# Patient Record
Sex: FEMALE | Race: WHITE | NOT HISPANIC OR LATINO | Employment: UNEMPLOYED | URBAN - METROPOLITAN AREA
[De-identification: names, ages, dates, MRNs, and addresses within clinical notes are randomized per-mention and may not be internally consistent; named-entity substitution may affect disease eponyms.]

---

## 2017-07-05 DIAGNOSIS — M79.671 ACUTE PAIN OF RIGHT FOOT: Primary | ICD-10-CM

## 2017-09-18 PROBLEM — F90.9 ADULT ADHD: Status: ACTIVE | Noted: 2017-09-18

## 2017-09-18 NOTE — PROGRESS NOTES
Follow up ADHD visit    HPI: Iris Blanchard is a 32 y.o. female with ADHD here for follow up and refill of her medication. Pt complains of difficulty concentrating and fatigue over the past month.  Pt is worried that she is becoming tolerant of Adderall XR because she has been on it for so long. She denies sxs of depression.  Sleeping very well, gets 8 hours most nights.  Normal appetite.  No tics, dull affect, headache, stomachache, irritability, tearfulness, sadness, does not feel socially withdrawn, no hallucinations, no loss of appetite, no trouble sleeping. No other side effects reported today.  HPI    Past Medical History:   Diagnosis Date    ADHD (attention deficit hyperactivity disorder)        Current Medication:  Current Outpatient Prescriptions:     cetirizine (ZYRTEC) 10 MG tablet, Take 10 mg by mouth once daily., Disp: , Rfl:     dextroamphetamine-amphetamine (ADDERALL XR) 15 MG 24 hr capsule, Take 1 capsule by mouth once daily., Disp: , Rfl:     dextroamphetamine-amphetamine (ADDERALL XR) 30 MG 24 hr capsule, Take 1 capsule by mouth once daily., Disp: , Rfl:     sulfamethoxazole-trimethoprim 800-160mg (BACTRIM DS) 800-160 mg Tab, Take 1 tablet by mouth every 12 (twelve) hours., Disp: , Rfl:     hydrOXYzine HCl (ATARAX) 25 MG tablet, Take 25 mg by mouth 3 (three) times daily., Disp: , Rfl:     minocycline (MINOCIN,DYNACIN) 50 MG Cap, Take 100 mg by mouth every 12 (twelve) hours., Disp: , Rfl:         ROS:  Stomach upset? NO  Weight loss? No  Insomnia? No  Mood lability/Irritability? No  Palpitions/tics? No      EXAM:  Vitals:    09/19/17 1116   BP: 120/85   BP Location: Left arm   Patient Position: Sitting   BP Method: Medium (Automatic)   Pulse: 101   Resp: 18   SpO2: 98%   Weight: 61 kg (134 lb 6.4 oz)     Physical Exam   Constitutional: She is oriented to person, place, and time. She appears well-developed and well-nourished.   HENT:   Head: Normocephalic and atraumatic.   Eyes: Pupils are  equal, round, and reactive to light.   Cardiovascular: Normal rate, regular rhythm, normal heart sounds and intact distal pulses.    Pulmonary/Chest: Effort normal and breath sounds normal. No respiratory distress.   Neurological: She is alert and oriented to person, place, and time.       Assessment:   1. Adult ADHD         Plan:  Iris was seen today for follow-up.    Diagnoses and all orders for this visit:    Adult ADHD      Change to adderall XR 60mg daily.  F/u in 1 month, sooner if any changes in mood, behavior, declining grades or development of any tics. May consider change to different med if not improving.

## 2017-09-19 ENCOUNTER — OFFICE VISIT (OUTPATIENT)
Dept: FAMILY MEDICINE | Facility: CLINIC | Age: 32
End: 2017-09-19
Payer: MEDICAID

## 2017-09-19 VITALS
RESPIRATION RATE: 18 BRPM | WEIGHT: 134.38 LBS | OXYGEN SATURATION: 98 % | HEART RATE: 101 BPM | DIASTOLIC BLOOD PRESSURE: 85 MMHG | SYSTOLIC BLOOD PRESSURE: 120 MMHG

## 2017-09-19 DIAGNOSIS — F90.9 ADULT ADHD: ICD-10-CM

## 2017-09-19 PROBLEM — N83.8 MASS OF OVARY: Status: ACTIVE | Noted: 2017-09-19

## 2017-09-19 PROBLEM — S76.119A RUPTURE OF QUADRICEPS TENDON: Status: RESOLVED | Noted: 2017-09-19 | Resolved: 2017-09-19

## 2017-09-19 PROBLEM — S76.119A RUPTURE OF QUADRICEPS TENDON: Status: ACTIVE | Noted: 2017-09-19

## 2017-09-19 PROBLEM — M41.9 SCOLIOSIS OF LUMBAR SPINE: Status: ACTIVE | Noted: 2017-09-19

## 2017-09-19 PROCEDURE — 99203 OFFICE O/P NEW LOW 30 MIN: CPT | Mod: ,,, | Performed by: INTERNAL MEDICINE

## 2017-09-19 RX ORDER — DEXTROAMPHETAMINE SACCHARATE, AMPHETAMINE ASPARTATE MONOHYDRATE, DEXTROAMPHETAMINE SULFATE AND AMPHETAMINE SULFATE 7.5; 7.5; 7.5; 7.5 MG/1; MG/1; MG/1; MG/1
1 CAPSULE, EXTENDED RELEASE ORAL DAILY
COMMUNITY
End: 2017-09-19 | Stop reason: SDUPTHER

## 2017-09-19 RX ORDER — DEXTROAMPHETAMINE SACCHARATE, AMPHETAMINE ASPARTATE MONOHYDRATE, DEXTROAMPHETAMINE SULFATE AND AMPHETAMINE SULFATE 7.5; 7.5; 7.5; 7.5 MG/1; MG/1; MG/1; MG/1
60 CAPSULE, EXTENDED RELEASE ORAL DAILY
Qty: 60 CAPSULE | Refills: 0 | Status: SHIPPED | OUTPATIENT
Start: 2017-09-19 | End: 2017-09-26 | Stop reason: SDUPTHER

## 2017-09-19 RX ORDER — DEXTROAMPHETAMINE SACCHARATE, AMPHETAMINE ASPARTATE, DEXTROAMPHETAMINE SULFATE AND AMPHETAMINE SULFATE 2.5; 2.5; 2.5; 2.5 MG/1; MG/1; MG/1; MG/1
1 TABLET ORAL DAILY PRN
COMMUNITY
End: 2017-09-19

## 2017-09-19 RX ORDER — MINOCYCLINE HYDROCHLORIDE 50 MG/1
100 CAPSULE ORAL
COMMUNITY
End: 2017-09-19

## 2017-09-19 RX ORDER — CETIRIZINE HYDROCHLORIDE 10 MG/1
10 TABLET ORAL DAILY
COMMUNITY
End: 2018-11-27 | Stop reason: SDUPTHER

## 2017-09-19 RX ORDER — SULFAMETHOXAZOLE AND TRIMETHOPRIM 800; 160 MG/1; MG/1
1 TABLET ORAL
COMMUNITY
End: 2017-10-19

## 2017-09-19 RX ORDER — DEXTROAMPHETAMINE SACCHARATE, AMPHETAMINE ASPARTATE MONOHYDRATE, DEXTROAMPHETAMINE SULFATE AND AMPHETAMINE SULFATE 3.75; 3.75; 3.75; 3.75 MG/1; MG/1; MG/1; MG/1
1 CAPSULE, EXTENDED RELEASE ORAL DAILY
COMMUNITY
End: 2017-09-19

## 2017-09-19 RX ORDER — HYDROXYZINE HYDROCHLORIDE 25 MG/1
25 TABLET, FILM COATED ORAL 3 TIMES DAILY
COMMUNITY
End: 2017-10-19 | Stop reason: SDUPTHER

## 2017-09-26 DIAGNOSIS — F90.9 ADULT ADHD: ICD-10-CM

## 2017-09-26 RX ORDER — DEXTROAMPHETAMINE SACCHARATE, AMPHETAMINE ASPARTATE MONOHYDRATE, DEXTROAMPHETAMINE SULFATE AND AMPHETAMINE SULFATE 6.25; 6.25; 6.25; 6.25 MG/1; MG/1; MG/1; MG/1
25 CAPSULE, EXTENDED RELEASE ORAL EVERY MORNING
Qty: 30 CAPSULE | Refills: 0 | Status: SHIPPED | OUTPATIENT
Start: 2017-09-26 | End: 2017-10-19 | Stop reason: SDUPTHER

## 2017-09-26 RX ORDER — DEXTROAMPHETAMINE SACCHARATE, AMPHETAMINE ASPARTATE MONOHYDRATE, DEXTROAMPHETAMINE SULFATE AND AMPHETAMINE SULFATE 7.5; 7.5; 7.5; 7.5 MG/1; MG/1; MG/1; MG/1
30 CAPSULE, EXTENDED RELEASE ORAL DAILY
Qty: 30 CAPSULE | Refills: 0 | Status: SHIPPED | OUTPATIENT
Start: 2017-09-26 | End: 2017-10-19 | Stop reason: SDUPTHER

## 2017-10-19 ENCOUNTER — OFFICE VISIT (OUTPATIENT)
Dept: FAMILY MEDICINE | Facility: CLINIC | Age: 32
End: 2017-10-19
Payer: MEDICAID

## 2017-10-19 VITALS
DIASTOLIC BLOOD PRESSURE: 87 MMHG | RESPIRATION RATE: 18 BRPM | SYSTOLIC BLOOD PRESSURE: 126 MMHG | TEMPERATURE: 98 F | WEIGHT: 129.81 LBS | OXYGEN SATURATION: 98 % | HEART RATE: 111 BPM

## 2017-10-19 DIAGNOSIS — F90.9 ADULT ADHD: ICD-10-CM

## 2017-10-19 DIAGNOSIS — F41.9 ANXIETY: Primary | ICD-10-CM

## 2017-10-19 DIAGNOSIS — Z72.0 TOBACCO ABUSE: ICD-10-CM

## 2017-10-19 PROCEDURE — 99213 OFFICE O/P EST LOW 20 MIN: CPT | Mod: ,,, | Performed by: INTERNAL MEDICINE

## 2017-10-19 RX ORDER — DEXTROAMPHETAMINE SACCHARATE, AMPHETAMINE ASPARTATE MONOHYDRATE, DEXTROAMPHETAMINE SULFATE AND AMPHETAMINE SULFATE 7.5; 7.5; 7.5; 7.5 MG/1; MG/1; MG/1; MG/1
30 CAPSULE, EXTENDED RELEASE ORAL DAILY
Qty: 30 CAPSULE | Refills: 0 | Status: SHIPPED | OUTPATIENT
Start: 2017-10-19 | End: 2017-10-19 | Stop reason: SDUPTHER

## 2017-10-19 RX ORDER — DEXTROAMPHETAMINE SACCHARATE, AMPHETAMINE ASPARTATE MONOHYDRATE, DEXTROAMPHETAMINE SULFATE AND AMPHETAMINE SULFATE 7.5; 7.5; 7.5; 7.5 MG/1; MG/1; MG/1; MG/1
30 CAPSULE, EXTENDED RELEASE ORAL DAILY
Qty: 30 CAPSULE | Refills: 0 | Status: SHIPPED | OUTPATIENT
Start: 2017-12-19 | End: 2018-01-22 | Stop reason: SDUPTHER

## 2017-10-19 RX ORDER — DEXTROAMPHETAMINE SACCHARATE, AMPHETAMINE ASPARTATE MONOHYDRATE, DEXTROAMPHETAMINE SULFATE AND AMPHETAMINE SULFATE 7.5; 7.5; 7.5; 7.5 MG/1; MG/1; MG/1; MG/1
30 CAPSULE, EXTENDED RELEASE ORAL DAILY
Qty: 30 CAPSULE | Refills: 0 | Status: SHIPPED | OUTPATIENT
Start: 2017-11-19 | End: 2017-10-19 | Stop reason: SDUPTHER

## 2017-10-19 RX ORDER — HYDROXYZINE HYDROCHLORIDE 25 MG/1
25 TABLET, FILM COATED ORAL 3 TIMES DAILY PRN
Qty: 10 TABLET | Refills: 3 | Status: SHIPPED | OUTPATIENT
Start: 2017-10-19 | End: 2018-01-24

## 2017-10-19 RX ORDER — DIPHENHYDRAMINE HCL 25 MG
CAPSULE ORAL
COMMUNITY
Start: 2017-08-20 | End: 2017-10-19 | Stop reason: SDUPTHER

## 2017-10-19 RX ORDER — DEXTROAMPHETAMINE SACCHARATE, AMPHETAMINE ASPARTATE MONOHYDRATE, DEXTROAMPHETAMINE SULFATE AND AMPHETAMINE SULFATE 6.25; 6.25; 6.25; 6.25 MG/1; MG/1; MG/1; MG/1
25 CAPSULE, EXTENDED RELEASE ORAL EVERY MORNING
Qty: 30 CAPSULE | Refills: 0 | Status: SHIPPED | OUTPATIENT
Start: 2017-11-19 | End: 2017-10-19 | Stop reason: SDUPTHER

## 2017-10-19 RX ORDER — DIPHENHYDRAMINE HCL 25 MG
4 CAPSULE ORAL
Qty: 100 EACH | Refills: 5 | Status: SHIPPED | OUTPATIENT
Start: 2017-10-19 | End: 2018-01-24

## 2017-10-19 RX ORDER — DEXTROAMPHETAMINE SACCHARATE, AMPHETAMINE ASPARTATE MONOHYDRATE, DEXTROAMPHETAMINE SULFATE AND AMPHETAMINE SULFATE 6.25; 6.25; 6.25; 6.25 MG/1; MG/1; MG/1; MG/1
25 CAPSULE, EXTENDED RELEASE ORAL EVERY MORNING
Qty: 30 CAPSULE | Refills: 0 | Status: SHIPPED | OUTPATIENT
Start: 2017-10-19 | End: 2017-10-19 | Stop reason: SDUPTHER

## 2017-10-19 RX ORDER — DEXTROAMPHETAMINE SACCHARATE, AMPHETAMINE ASPARTATE MONOHYDRATE, DEXTROAMPHETAMINE SULFATE AND AMPHETAMINE SULFATE 6.25; 6.25; 6.25; 6.25 MG/1; MG/1; MG/1; MG/1
25 CAPSULE, EXTENDED RELEASE ORAL EVERY MORNING
Qty: 30 CAPSULE | Refills: 0 | Status: SHIPPED | OUTPATIENT
Start: 2017-12-19 | End: 2018-01-22 | Stop reason: SDUPTHER

## 2017-10-19 NOTE — PROGRESS NOTES
Follow up ADHD visit    HPI: Iris Blanchard is a 32 y.o. female with ADHD here for follow up and refill of her medication. She  has been doing okay overall. Feels [employer] is working, though she is stressed due to her son's medical issues.     ROS: No tics, dull affect, headache, stomachache, irritability, tearfulness, sadness, does not feel socially withdrawn, no hallucinations, no loss of appetite, no trouble sleeping. No other side effects reported today.    Past Medical History:   Diagnosis Date    ADHD (attention deficit hyperactivity disorder)     Anxiety          Current Outpatient Prescriptions:     cetirizine (ZYRTEC) 10 MG tablet, Take 10 mg by mouth once daily., Disp: , Rfl:     [START ON 12/19/2017] dextroamphetamine-amphetamine (ADDERALL XR) 25 MG 24 hr capsule, Take 1 capsule (25 mg total) by mouth every morning., Disp: 30 capsule, Rfl: 0    [START ON 12/19/2017] dextroamphetamine-amphetamine (ADDERALL XR) 30 MG 24 hr capsule, Take 1 capsule (30 mg total) by mouth once daily., Disp: 30 capsule, Rfl: 0    hydrOXYzine HCl (ATARAX) 25 MG tablet, Take 1 tablet (25 mg total) by mouth 3 (three) times daily as needed for Anxiety., Disp: 10 tablet, Rfl: 3    nicotine polacrilex (NICORETTE) 4 MG Gum, , Disp: , Rfl:     Exam:  Vitals:    10/19/17 1028   BP: 126/87   BP Location: Right arm   Patient Position: Sitting   BP Method: Medium (Automatic)   Pulse: (!) 111   Resp: 18   Temp: 98.2 °F (36.8 °C)   TempSrc: Oral   SpO2: 98%   Weight: 58.9 kg (129 lb 12.8 oz)     Physical Exam   Constitutional: She is oriented to person, place, and time. She appears well-developed and well-nourished. No distress.   Eyes: Pupils are equal, round, and reactive to light.   Cardiovascular: Normal rate, regular rhythm and normal heart sounds.    Pulmonary/Chest: Effort normal and breath sounds normal.   Neurological: She is alert and oriented to person, place, and time.   Psychiatric: She has a normal mood and affect.  Her behavior is normal.       Assessment/Plan:  Iris is here for follow-up of ADHD, which is well controlled on current treatment plan. Continue on current medication regimen. Regular follow-up in 3 months, sooner if any changes in mood, behavior, development of any side effects.     Problem List Items Addressed This Visit        Psychiatric    Adult ADHD    Relevant Medications    dextroamphetamine-amphetamine (ADDERALL XR) 25 MG 24 hr capsule (Start on 12/19/2017)    dextroamphetamine-amphetamine (ADDERALL XR) 30 MG 24 hr capsule (Start on 12/19/2017)      Other Visit Diagnoses     Anxiety    -  Primary    Relevant Medications    hydrOXYzine HCl (ATARAX) 25 MG tablet    Tobacco abuse

## 2017-11-07 ENCOUNTER — OFFICE VISIT (OUTPATIENT)
Dept: FAMILY MEDICINE | Facility: CLINIC | Age: 32
End: 2017-11-07
Payer: MEDICAID

## 2017-11-07 VITALS
TEMPERATURE: 98 F | WEIGHT: 134 LBS | HEART RATE: 106 BPM | RESPIRATION RATE: 18 BRPM | OXYGEN SATURATION: 99 % | DIASTOLIC BLOOD PRESSURE: 86 MMHG | SYSTOLIC BLOOD PRESSURE: 132 MMHG

## 2017-11-07 DIAGNOSIS — H92.02 LEFT EAR PAIN: Primary | ICD-10-CM

## 2017-11-07 PROCEDURE — 99213 OFFICE O/P EST LOW 20 MIN: CPT | Mod: ,,, | Performed by: INTERNAL MEDICINE

## 2017-11-07 NOTE — PROGRESS NOTES
Adult Urgent Visit    Chief Complaint   Patient presents with    Otalgia     left ear       31 yo woman here with 3 days of increasing L ear pain. Denies URI symptoms, though she has a h/o nasal allergies for which she takes xyzal. No fever.  No discharge from ear.  On first day also had L sided TMJ pain but denies h/o TMJ dysfunction, jaw popping/clicking.        Otalgia    Associated symptoms include rhinorrhea. Pertinent negatives include no abdominal pain, coughing, diarrhea, ear discharge, headaches, hearing loss, neck pain, rash or sore throat.       Review of Systems   HENT: Positive for ear pain and rhinorrhea. Negative for ear discharge, hearing loss and sore throat.    Respiratory: Negative for cough.    Gastrointestinal: Negative for abdominal pain and diarrhea.   Musculoskeletal: Negative for neck pain.   Skin: Negative for rash.   Neurological: Negative for headaches.       Past medical, social and family history reviewed and there are no pertinent changes.       Current Outpatient Prescriptions:     cetirizine (ZYRTEC) 10 MG tablet, Take 10 mg by mouth once daily., Disp: , Rfl:     [START ON 12/19/2017] dextroamphetamine-amphetamine (ADDERALL XR) 25 MG 24 hr capsule, Take 1 capsule (25 mg total) by mouth every morning., Disp: 30 capsule, Rfl: 0    [START ON 12/19/2017] dextroamphetamine-amphetamine (ADDERALL XR) 30 MG 24 hr capsule, Take 1 capsule (30 mg total) by mouth once daily., Disp: 30 capsule, Rfl: 0    hydrOXYzine HCl (ATARAX) 25 MG tablet, Take 1 tablet (25 mg total) by mouth 3 (three) times daily as needed for Anxiety., Disp: 10 tablet, Rfl: 3    nicotine polacrilex (NICORETTE) 4 MG Gum, Take 1 each (4 mg total) by mouth as needed., Disp: 100 each, Rfl: 5    Vitals:    11/07/17 1258   BP: 132/86   BP Location: Left arm   Patient Position: Sitting   BP Method: Medium (Automatic)   Pulse: 106   Resp: 18   Temp: 97.8 °F (36.6 °C)   TempSrc: Oral   SpO2: 99%   Weight:  60.8 kg (134 lb)       Physical Exam   Constitutional: She appears well-developed and well-nourished. No distress.   HENT:   Right Ear: Tympanic membrane and ear canal normal.   Left Ear: External ear and ear canal normal. No swelling. Tympanic membrane is not erythematous, not retracted and not bulging. A middle ear effusion is present.   Eyes: Pupils are equal, round, and reactive to light.   Cardiovascular: Regular rhythm, normal heart sounds and intact distal pulses.    No murmur heard.  Pulmonary/Chest: Effort normal and breath sounds normal. She has no wheezes. She has no rales.   Musculoskeletal: She exhibits no edema.       Asessment/Plan:  Iris is a 32 y.o. female here with complaint of Otalgia (left ear)  No obvious cause on exam, though there is a little clear fluid behind L ear. Advised NSAIDs for pain, continue xyzal and add decongestant for 1 week.  If not improving, will refer to ENT.       Problem List Items Addressed This Visit     None

## 2018-01-22 ENCOUNTER — TELEPHONE (OUTPATIENT)
Dept: FAMILY MEDICINE | Facility: CLINIC | Age: 33
End: 2018-01-22

## 2018-01-22 DIAGNOSIS — F90.9 ADULT ADHD: ICD-10-CM

## 2018-01-22 RX ORDER — DEXTROAMPHETAMINE SACCHARATE, AMPHETAMINE ASPARTATE MONOHYDRATE, DEXTROAMPHETAMINE SULFATE AND AMPHETAMINE SULFATE 7.5; 7.5; 7.5; 7.5 MG/1; MG/1; MG/1; MG/1
30 CAPSULE, EXTENDED RELEASE ORAL DAILY
Qty: 30 CAPSULE | Refills: 0 | Status: SHIPPED | OUTPATIENT
Start: 2018-01-22 | End: 2018-01-24 | Stop reason: SDUPTHER

## 2018-01-22 RX ORDER — DEXTROAMPHETAMINE SACCHARATE, AMPHETAMINE ASPARTATE MONOHYDRATE, DEXTROAMPHETAMINE SULFATE AND AMPHETAMINE SULFATE 6.25; 6.25; 6.25; 6.25 MG/1; MG/1; MG/1; MG/1
25 CAPSULE, EXTENDED RELEASE ORAL EVERY MORNING
Qty: 30 CAPSULE | Refills: 0 | Status: SHIPPED | OUTPATIENT
Start: 2018-01-22 | End: 2018-01-24 | Stop reason: SDUPTHER

## 2018-01-22 NOTE — TELEPHONE ENCOUNTER
----- Message from Liliane Herrera sent at 1/22/2018  8:58 AM CST -----  Patient states she is out of her adderall and needs it called in before her Weds appt.

## 2018-01-24 ENCOUNTER — OFFICE VISIT (OUTPATIENT)
Dept: FAMILY MEDICINE | Facility: CLINIC | Age: 33
End: 2018-01-24
Payer: MEDICAID

## 2018-01-24 VITALS
RESPIRATION RATE: 18 BRPM | TEMPERATURE: 98 F | WEIGHT: 135 LBS | HEART RATE: 56 BPM | SYSTOLIC BLOOD PRESSURE: 122 MMHG | OXYGEN SATURATION: 98 % | DIASTOLIC BLOOD PRESSURE: 60 MMHG

## 2018-01-24 DIAGNOSIS — F90.9 ADULT ADHD: ICD-10-CM

## 2018-01-24 DIAGNOSIS — J01.20 ACUTE NON-RECURRENT ETHMOIDAL SINUSITIS: ICD-10-CM

## 2018-01-24 PROCEDURE — 99213 OFFICE O/P EST LOW 20 MIN: CPT | Mod: ,,, | Performed by: INTERNAL MEDICINE

## 2018-01-24 RX ORDER — BENZONATATE 100 MG/1
100 CAPSULE ORAL 3 TIMES DAILY PRN
Qty: 30 CAPSULE | Refills: 0 | Status: SHIPPED | OUTPATIENT
Start: 2018-01-24 | End: 2018-04-18

## 2018-01-24 RX ORDER — AMOXICILLIN AND CLAVULANATE POTASSIUM 875; 125 MG/1; MG/1
1 TABLET, FILM COATED ORAL 2 TIMES DAILY
Qty: 20 TABLET | Refills: 0 | Status: SHIPPED | OUTPATIENT
Start: 2018-01-24 | End: 2018-04-18

## 2018-01-24 RX ORDER — MINOCYCLINE HYDROCHLORIDE 50 MG/1
CAPSULE ORAL
COMMUNITY
Start: 2017-12-16 | End: 2018-07-16

## 2018-01-24 RX ORDER — DEXTROAMPHETAMINE SACCHARATE, AMPHETAMINE ASPARTATE MONOHYDRATE, DEXTROAMPHETAMINE SULFATE AND AMPHETAMINE SULFATE 6.25; 6.25; 6.25; 6.25 MG/1; MG/1; MG/1; MG/1
25 CAPSULE, EXTENDED RELEASE ORAL EVERY MORNING
Qty: 30 CAPSULE | Refills: 0 | Status: SHIPPED | OUTPATIENT
Start: 2018-02-24 | End: 2018-01-24 | Stop reason: SDUPTHER

## 2018-01-24 RX ORDER — LORATADINE 10 MG/1
TABLET ORAL
COMMUNITY
Start: 2018-01-07 | End: 2018-07-16

## 2018-01-24 RX ORDER — DEXTROAMPHETAMINE SACCHARATE, AMPHETAMINE ASPARTATE MONOHYDRATE, DEXTROAMPHETAMINE SULFATE AND AMPHETAMINE SULFATE 7.5; 7.5; 7.5; 7.5 MG/1; MG/1; MG/1; MG/1
30 CAPSULE, EXTENDED RELEASE ORAL DAILY
Qty: 30 CAPSULE | Refills: 0 | Status: SHIPPED | OUTPATIENT
Start: 2018-02-24 | End: 2018-01-24 | Stop reason: SDUPTHER

## 2018-01-24 RX ORDER — DEXTROAMPHETAMINE SACCHARATE, AMPHETAMINE ASPARTATE MONOHYDRATE, DEXTROAMPHETAMINE SULFATE AND AMPHETAMINE SULFATE 7.5; 7.5; 7.5; 7.5 MG/1; MG/1; MG/1; MG/1
30 CAPSULE, EXTENDED RELEASE ORAL DAILY
Qty: 30 CAPSULE | Refills: 0 | Status: SHIPPED | OUTPATIENT
Start: 2018-03-24 | End: 2018-04-18 | Stop reason: SDUPTHER

## 2018-01-24 RX ORDER — DEXTROAMPHETAMINE SACCHARATE, AMPHETAMINE ASPARTATE MONOHYDRATE, DEXTROAMPHETAMINE SULFATE AND AMPHETAMINE SULFATE 6.25; 6.25; 6.25; 6.25 MG/1; MG/1; MG/1; MG/1
25 CAPSULE, EXTENDED RELEASE ORAL EVERY MORNING
Qty: 30 CAPSULE | Refills: 0 | Status: SHIPPED | OUTPATIENT
Start: 2018-03-24 | End: 2018-04-18 | Stop reason: SDUPTHER

## 2018-01-24 NOTE — PROGRESS NOTES
Adult Urgent Visit    Chief Complaint   Patient presents with    Medication Refill    Cough       31 yo here with 3 weeks of nasal congestion, post nasal drip, sinus congestion and cough.  Had fever initially, none recently. Taking OTC cold meds without improvement.       Medication Refill   Associated symptoms include congestion and coughing. Pertinent negatives include no abdominal pain, anorexia, fever, headaches, joint swelling or nausea.   Cough   Pertinent negatives include no fever or headaches.       Review of Systems   Constitutional: Negative for fever.   HENT: Positive for congestion.    Respiratory: Positive for cough.    Gastrointestinal: Negative for abdominal pain, anorexia and nausea.   Musculoskeletal: Negative for joint swelling.   Neurological: Negative for headaches.       Past medical, social and family history reviewed and there are no pertinent changes.       Current Outpatient Prescriptions:     cetirizine (ZYRTEC) 10 MG tablet, Take 10 mg by mouth once daily., Disp: , Rfl:     [START ON 3/24/2018] dextroamphetamine-amphetamine (ADDERALL XR) 25 MG 24 hr capsule, Take 1 capsule (25 mg total) by mouth every morning., Disp: 30 capsule, Rfl: 0    [START ON 3/24/2018] dextroamphetamine-amphetamine (ADDERALL XR) 30 MG 24 hr capsule, Take 1 capsule (30 mg total) by mouth once daily., Disp: 30 capsule, Rfl: 0    loratadine (CLARITIN) 10 mg tablet, , Disp: , Rfl:     minocycline (MINOCIN,DYNACIN) 50 MG Cap, , Disp: , Rfl:     amoxicillin-clavulanate 875-125mg (AUGMENTIN) 875-125 mg per tablet, Take 1 tablet by mouth 2 (two) times daily., Disp: 20 tablet, Rfl: 0    benzonatate (TESSALON) 100 MG capsule, Take 1 capsule (100 mg total) by mouth 3 (three) times daily as needed for Cough., Disp: 30 capsule, Rfl: 0    Vitals:    01/24/18 1316   BP: 122/60   BP Location: Left arm   Patient Position: Sitting   BP Method: Medium (Manual)   Pulse: (!) 56   Resp: 18   Temp: 98.2 °F  (36.8 °C)   TempSrc: Oral   SpO2: 98%   Weight: 61.2 kg (135 lb)       Physical Exam   Constitutional: She appears well-developed and well-nourished. No distress.   HENT:   Right Ear: Tympanic membrane normal.   Left Ear: Tympanic membrane normal.   Nose: Mucosal edema, rhinorrhea and sinus tenderness present. Right sinus exhibits no maxillary sinus tenderness and no frontal sinus tenderness. Left sinus exhibits no maxillary sinus tenderness and no frontal sinus tenderness.   Mouth/Throat: Posterior oropharyngeal erythema (post nasal rhinorrhea) present. No oropharyngeal exudate or posterior oropharyngeal edema.   Eyes: Pupils are equal, round, and reactive to light.   Cardiovascular: Regular rhythm, normal heart sounds and intact distal pulses.    No murmur heard.  Pulmonary/Chest: Effort normal and breath sounds normal. She has no wheezes. She has no rales.   Musculoskeletal: She exhibits no edema.       Asessment/Plan:  Iris is a 32 y.o. female here with complaint of Medication Refill and Cough  Continue adderall 55 mg daily, no side effects or issues.  Treat for sinusitis with augmentin, PrN tessalon perles. Advised flonase BID for 2 weeks.     Problem List Items Addressed This Visit        Psychiatric    Adult ADHD    Relevant Medications    dextroamphetamine-amphetamine (ADDERALL XR) 30 MG 24 hr capsule (Start on 3/24/2018)    dextroamphetamine-amphetamine (ADDERALL XR) 25 MG 24 hr capsule (Start on 3/24/2018)       ENT    Acute non-recurrent ethmoidal sinusitis    Relevant Medications    amoxicillin-clavulanate 875-125mg (AUGMENTIN) 875-125 mg per tablet    benzonatate (TESSALON) 100 MG capsule

## 2018-01-24 NOTE — PATIENT INSTRUCTIONS

## 2018-04-18 ENCOUNTER — OFFICE VISIT (OUTPATIENT)
Dept: FAMILY MEDICINE | Facility: CLINIC | Age: 33
End: 2018-04-18
Payer: MEDICAID

## 2018-04-18 VITALS
TEMPERATURE: 98 F | OXYGEN SATURATION: 98 % | HEART RATE: 124 BPM | WEIGHT: 133.31 LBS | RESPIRATION RATE: 18 BRPM | SYSTOLIC BLOOD PRESSURE: 115 MMHG | DIASTOLIC BLOOD PRESSURE: 80 MMHG

## 2018-04-18 DIAGNOSIS — M54.41 ACUTE RIGHT-SIDED LOW BACK PAIN WITH RIGHT-SIDED SCIATICA: ICD-10-CM

## 2018-04-18 DIAGNOSIS — F90.9 ADULT ADHD: ICD-10-CM

## 2018-04-18 PROBLEM — J01.20 ACUTE NON-RECURRENT ETHMOIDAL SINUSITIS: Status: RESOLVED | Noted: 2018-01-24 | Resolved: 2018-04-18

## 2018-04-18 PROCEDURE — 99213 OFFICE O/P EST LOW 20 MIN: CPT | Mod: ,,, | Performed by: INTERNAL MEDICINE

## 2018-04-18 RX ORDER — DEXTROAMPHETAMINE SACCHARATE, AMPHETAMINE ASPARTATE MONOHYDRATE, DEXTROAMPHETAMINE SULFATE AND AMPHETAMINE SULFATE 7.5; 7.5; 7.5; 7.5 MG/1; MG/1; MG/1; MG/1
30 CAPSULE, EXTENDED RELEASE ORAL DAILY
Qty: 30 CAPSULE | Refills: 0 | Status: SHIPPED | OUTPATIENT
Start: 2018-05-18 | End: 2018-04-18 | Stop reason: SDUPTHER

## 2018-04-18 RX ORDER — CYCLOBENZAPRINE HCL 5 MG
5 TABLET ORAL 3 TIMES DAILY PRN
Qty: 30 TABLET | Refills: 1 | Status: SHIPPED | OUTPATIENT
Start: 2018-04-18 | End: 2018-04-28

## 2018-04-18 RX ORDER — DEXTROAMPHETAMINE SACCHARATE, AMPHETAMINE ASPARTATE MONOHYDRATE, DEXTROAMPHETAMINE SULFATE AND AMPHETAMINE SULFATE 6.25; 6.25; 6.25; 6.25 MG/1; MG/1; MG/1; MG/1
25 CAPSULE, EXTENDED RELEASE ORAL EVERY MORNING
Qty: 30 CAPSULE | Refills: 0 | Status: SHIPPED | OUTPATIENT
Start: 2018-06-18 | End: 2018-07-16 | Stop reason: SDUPTHER

## 2018-04-18 RX ORDER — DEXTROAMPHETAMINE SACCHARATE, AMPHETAMINE ASPARTATE MONOHYDRATE, DEXTROAMPHETAMINE SULFATE AND AMPHETAMINE SULFATE 6.25; 6.25; 6.25; 6.25 MG/1; MG/1; MG/1; MG/1
25 CAPSULE, EXTENDED RELEASE ORAL EVERY MORNING
Qty: 30 CAPSULE | Refills: 0 | Status: SHIPPED | OUTPATIENT
Start: 2018-04-18 | End: 2018-04-18 | Stop reason: SDUPTHER

## 2018-04-18 RX ORDER — ETODOLAC 400 MG/1
400 TABLET, FILM COATED ORAL 2 TIMES DAILY
Qty: 60 TABLET | Refills: 2 | Status: SHIPPED | OUTPATIENT
Start: 2018-04-18 | End: 2018-07-16

## 2018-04-18 RX ORDER — DEXTROAMPHETAMINE SACCHARATE, AMPHETAMINE ASPARTATE MONOHYDRATE, DEXTROAMPHETAMINE SULFATE AND AMPHETAMINE SULFATE 7.5; 7.5; 7.5; 7.5 MG/1; MG/1; MG/1; MG/1
30 CAPSULE, EXTENDED RELEASE ORAL DAILY
Qty: 30 CAPSULE | Refills: 0 | Status: SHIPPED | OUTPATIENT
Start: 2018-06-18 | End: 2018-07-16 | Stop reason: SDUPTHER

## 2018-04-18 RX ORDER — DEXTROAMPHETAMINE SACCHARATE, AMPHETAMINE ASPARTATE MONOHYDRATE, DEXTROAMPHETAMINE SULFATE AND AMPHETAMINE SULFATE 7.5; 7.5; 7.5; 7.5 MG/1; MG/1; MG/1; MG/1
30 CAPSULE, EXTENDED RELEASE ORAL DAILY
Qty: 30 CAPSULE | Refills: 0 | Status: SHIPPED | OUTPATIENT
Start: 2018-04-18 | End: 2018-04-18 | Stop reason: SDUPTHER

## 2018-04-18 RX ORDER — DEXTROAMPHETAMINE SACCHARATE, AMPHETAMINE ASPARTATE MONOHYDRATE, DEXTROAMPHETAMINE SULFATE AND AMPHETAMINE SULFATE 6.25; 6.25; 6.25; 6.25 MG/1; MG/1; MG/1; MG/1
25 CAPSULE, EXTENDED RELEASE ORAL EVERY MORNING
Qty: 30 CAPSULE | Refills: 0 | Status: SHIPPED | OUTPATIENT
Start: 2018-05-18 | End: 2018-04-18 | Stop reason: SDUPTHER

## 2018-04-18 NOTE — ASSESSMENT & PLAN NOTE
Rx etodolac and cyclobenzaprine for current flare of back pain.  Do not feel repeat MRI warranted at this time. Would benefit from physical therapy, suspect tight hamstrings playing a role.  Pt will schedule a follow-up with Dr. Beard and discuss with him.

## 2018-04-18 NOTE — PROGRESS NOTES
Adult Urgent Visit    Chief Complaint   Patient presents with    Back Pain    Hip Pain       34 yo woman here with 1 week of R sided low back pain, radiating to R leg.  Pt has had h/o R hip and low back pain in the past, was seeing Dr. Beard for this in 2016. Had MRI of lumbar spine and pelvis 11/2016 showing mild disc bulge at L5-S1 w/o foraminal narrowing or central canal stenosis, hip arthritis (worse on L) and mild hamstring teninosis. Pt reports pain improved but still comes and goes. For the past week the pain has been severe, radiates from R side of low back down R leg to her foot. No numbness/tingling/weakness/bowel or bladder issues noted.  Pt saw a chiropractor yesterday, had x-rays done which reportedly showed only arthritis in hip and back, and traction therapy which has helped her pain some.     Back Pain   Associated symptoms include leg pain. Pertinent negatives include no abdominal pain, bladder incontinence, bowel incontinence, chest pain, dysuria, fever, headaches, numbness, paresis, paresthesias, pelvic pain, perianal numbness, tingling, weakness or weight loss.       Review of Systems   Constitutional: Negative for fever and weight loss.   Cardiovascular: Negative for chest pain.   Gastrointestinal: Negative for abdominal pain and bowel incontinence.   Genitourinary: Negative for bladder incontinence, dysuria and pelvic pain.   Musculoskeletal: Positive for back pain.   Neurological: Negative for tingling, weakness, numbness, headaches and paresthesias.       Past medical, social and family history reviewed and there are no pertinent changes.       Current Outpatient Prescriptions:     [START ON 6/18/2018] dextroamphetamine-amphetamine (ADDERALL XR) 25 MG 24 hr capsule, Take 1 capsule (25 mg total) by mouth every morning., Disp: 30 capsule, Rfl: 0    [START ON 6/18/2018] dextroamphetamine-amphetamine (ADDERALL XR) 30 MG 24 hr capsule, Take 1 capsule (30 mg total) by mouth  once daily., Disp: 30 capsule, Rfl: 0    loratadine (CLARITIN) 10 mg tablet, , Disp: , Rfl:     minocycline (MINOCIN,DYNACIN) 50 MG Cap, , Disp: , Rfl:     cetirizine (ZYRTEC) 10 MG tablet, Take 10 mg by mouth once daily., Disp: , Rfl:     cyclobenzaprine (FLEXERIL) 5 MG tablet, Take 1 tablet (5 mg total) by mouth 3 (three) times daily as needed for Muscle spasms., Disp: 30 tablet, Rfl: 1    etodolac (LODINE) 400 MG tablet, Take 1 tablet (400 mg total) by mouth 2 (two) times daily. Take with food, Disp: 60 tablet, Rfl: 2    Vitals:    04/18/18 1419   BP: 115/80   Pulse: (!) 124   Resp: 18   Temp: 97.8 °F (36.6 °C)   TempSrc: Oral   SpO2: 98%   Weight: 60.5 kg (133 lb 4.8 oz)       Physical Exam   Constitutional: She appears well-developed and well-nourished. No distress.   Eyes: Pupils are equal, round, and reactive to light.   Cardiovascular: Regular rhythm, normal heart sounds and intact distal pulses.    No murmur heard.  Pulmonary/Chest: Effort normal and breath sounds normal. She has no wheezes. She has no rales.   Musculoskeletal: She exhibits no edema.        Right hip: She exhibits decreased range of motion and crepitus. She exhibits no tenderness and no deformity.        Left hip: Normal.        Lumbar back: She exhibits tenderness (at  R sciatic notch). She exhibits normal range of motion, no swelling, no deformity and no pain (negative straight leg raise bilaterally).       Asessment/Plan:  Iris is a 33 y.o. female here with complaint of Back Pain and Hip Pain  .      Problem List Items Addressed This Visit        Psychiatric    Adult ADHD    Current Assessment & Plan     Continue adderall XR 55 mg daily.          Relevant Medications    dextroamphetamine-amphetamine (ADDERALL XR) 25 MG 24 hr capsule (Start on 6/18/2018)    dextroamphetamine-amphetamine (ADDERALL XR) 30 MG 24 hr capsule (Start on 6/18/2018)       Orthopedic    Acute low back pain with right-sided sciatica    Current Assessment &  Plan     Rx etodolac and cyclobenzaprine for current flare of back pain.  Do not feel repeat MRI warranted at this time. Would benefit from physical therapy, suspect tight hamstrings playing a role.  Pt will schedule a follow-up with Dr. Beard and discuss with him.          Relevant Medications    etodolac (LODINE) 400 MG tablet    cyclobenzaprine (FLEXERIL) 5 MG tablet

## 2018-07-16 ENCOUNTER — OFFICE VISIT (OUTPATIENT)
Dept: FAMILY MEDICINE | Facility: CLINIC | Age: 33
End: 2018-07-16
Payer: MEDICAID

## 2018-07-16 VITALS
DIASTOLIC BLOOD PRESSURE: 66 MMHG | WEIGHT: 126.56 LBS | RESPIRATION RATE: 18 BRPM | HEART RATE: 104 BPM | SYSTOLIC BLOOD PRESSURE: 102 MMHG | TEMPERATURE: 98 F | OXYGEN SATURATION: 99 %

## 2018-07-16 DIAGNOSIS — Z13.1 SCREENING FOR DIABETES MELLITUS: ICD-10-CM

## 2018-07-16 DIAGNOSIS — Z13.220 SCREENING, LIPID: Primary | ICD-10-CM

## 2018-07-16 DIAGNOSIS — F90.9 ADULT ADHD: ICD-10-CM

## 2018-07-16 PROCEDURE — 99213 OFFICE O/P EST LOW 20 MIN: CPT | Mod: ,,, | Performed by: INTERNAL MEDICINE

## 2018-07-16 RX ORDER — VALACYCLOVIR HYDROCHLORIDE 500 MG/1
TABLET, FILM COATED ORAL
Refills: 0 | COMMUNITY
Start: 2018-07-06 | End: 2018-10-15

## 2018-07-16 RX ORDER — DEXTROAMPHETAMINE SACCHARATE, AMPHETAMINE ASPARTATE MONOHYDRATE, DEXTROAMPHETAMINE SULFATE AND AMPHETAMINE SULFATE 7.5; 7.5; 7.5; 7.5 MG/1; MG/1; MG/1; MG/1
30 CAPSULE, EXTENDED RELEASE ORAL DAILY
Qty: 30 CAPSULE | Refills: 0 | Status: SHIPPED | OUTPATIENT
Start: 2018-09-16 | End: 2018-10-15 | Stop reason: SDUPTHER

## 2018-07-16 RX ORDER — DEXTROAMPHETAMINE SACCHARATE, AMPHETAMINE ASPARTATE MONOHYDRATE, DEXTROAMPHETAMINE SULFATE AND AMPHETAMINE SULFATE 6.25; 6.25; 6.25; 6.25 MG/1; MG/1; MG/1; MG/1
25 CAPSULE, EXTENDED RELEASE ORAL EVERY MORNING
Qty: 30 CAPSULE | Refills: 0 | Status: SHIPPED | OUTPATIENT
Start: 2018-07-16 | End: 2018-07-16 | Stop reason: SDUPTHER

## 2018-07-16 RX ORDER — DEXTROAMPHETAMINE SACCHARATE, AMPHETAMINE ASPARTATE MONOHYDRATE, DEXTROAMPHETAMINE SULFATE AND AMPHETAMINE SULFATE 6.25; 6.25; 6.25; 6.25 MG/1; MG/1; MG/1; MG/1
25 CAPSULE, EXTENDED RELEASE ORAL EVERY MORNING
Qty: 30 CAPSULE | Refills: 0 | Status: SHIPPED | OUTPATIENT
Start: 2018-09-16 | End: 2018-10-15 | Stop reason: SDUPTHER

## 2018-07-16 RX ORDER — DEXTROAMPHETAMINE SACCHARATE, AMPHETAMINE ASPARTATE MONOHYDRATE, DEXTROAMPHETAMINE SULFATE AND AMPHETAMINE SULFATE 7.5; 7.5; 7.5; 7.5 MG/1; MG/1; MG/1; MG/1
30 CAPSULE, EXTENDED RELEASE ORAL DAILY
Qty: 30 CAPSULE | Refills: 0 | Status: SHIPPED | OUTPATIENT
Start: 2018-07-16 | End: 2018-07-16 | Stop reason: SDUPTHER

## 2018-07-16 RX ORDER — DEXTROAMPHETAMINE SACCHARATE, AMPHETAMINE ASPARTATE MONOHYDRATE, DEXTROAMPHETAMINE SULFATE AND AMPHETAMINE SULFATE 6.25; 6.25; 6.25; 6.25 MG/1; MG/1; MG/1; MG/1
25 CAPSULE, EXTENDED RELEASE ORAL EVERY MORNING
Qty: 30 CAPSULE | Refills: 0 | Status: SHIPPED | OUTPATIENT
Start: 2018-08-16 | End: 2018-07-16 | Stop reason: SDUPTHER

## 2018-07-16 RX ORDER — CLINDAMYCIN PHOSPHATE 11.9 MG/ML
SOLUTION TOPICAL
Refills: 5 | COMMUNITY
Start: 2018-07-06

## 2018-07-16 RX ORDER — SPIRONOLACTONE 25 MG/1
TABLET ORAL
Refills: 3 | COMMUNITY
Start: 2018-07-06 | End: 2021-09-28

## 2018-07-16 RX ORDER — DEXTROAMPHETAMINE SACCHARATE, AMPHETAMINE ASPARTATE MONOHYDRATE, DEXTROAMPHETAMINE SULFATE AND AMPHETAMINE SULFATE 7.5; 7.5; 7.5; 7.5 MG/1; MG/1; MG/1; MG/1
30 CAPSULE, EXTENDED RELEASE ORAL DAILY
Qty: 30 CAPSULE | Refills: 0 | Status: SHIPPED | OUTPATIENT
Start: 2018-08-16 | End: 2018-07-16 | Stop reason: SDUPTHER

## 2018-07-16 NOTE — PATIENT INSTRUCTIONS
[facility]   Address: [address] [place] [address]   Hours:   Sunday: Closed  Monday-Friday: 6AM - 5PM  Saturday: Closed    Phone: [phone]    You need to fast (nothing to eat or drink besides water and medications) for 8 hours before your labs are drawn.

## 2018-07-16 NOTE — PROGRESS NOTES
Follow up ADHD visit    HPI: Iris Blanchard is a 33 y.o. female with ADHD here for follow up and refill of her medication. She  has no significant complaints.  Able to concentrate, no side effects.     ROS: No tics, dull affect, headache, stomachache, irritability, tearfulness, sadness, does not feel socially withdrawn, no hallucinations, no loss of appetite, no trouble sleeping. No other side effects reported today.    Past Medical History:   Diagnosis Date    ADHD (attention deficit hyperactivity disorder)     Anxiety          Current Outpatient Prescriptions:     cetirizine (ZYRTEC) 10 MG tablet, Take 10 mg by mouth once daily., Disp: , Rfl:     clindamycin (CLEOCIN T) 1 % external solution, APPLY TO SKIN BID, Disp: , Rfl: 5    [START ON 9/16/2018] dextroamphetamine-amphetamine (ADDERALL XR) 25 MG 24 hr capsule, Take 1 capsule (25 mg total) by mouth every morning., Disp: 30 capsule, Rfl: 0    [START ON 9/16/2018] dextroamphetamine-amphetamine (ADDERALL XR) 30 MG 24 hr capsule, Take 1 capsule (30 mg total) by mouth once daily., Disp: 30 capsule, Rfl: 0    NICORELIEF 4 mg Gum, , Disp: , Rfl: 4    spironolactone (ALDACTONE) 25 MG tablet, TK 1 T PO BID, Disp: , Rfl: 3    valACYclovir (VALTREX) 500 MG tablet, TK 1 T PO QD, Disp: , Rfl: 0    Exam:  Vitals:    07/16/18 1012   BP: 102/66   Pulse: 104   Resp: 18   Temp: 97.8 °F (36.6 °C)   TempSrc: Oral   SpO2: 99%   Weight: 57.4 kg (126 lb 9 oz)     Physical Exam   Constitutional: She appears well-developed and well-nourished. No distress.   HENT:   Head: Normocephalic and atraumatic.   Nose: Nose normal.   Mouth/Throat: Oropharynx is clear and moist and mucous membranes are normal.   Eyes: Conjunctivae are normal. Pupils are equal, round, and reactive to light.   Cardiovascular: Normal rate, regular rhythm, normal heart sounds and intact distal pulses.    No murmur heard.  Pulmonary/Chest: Effort normal and breath sounds normal. She has no wheezes. She has no  rales.   Musculoskeletal: She exhibits no edema.   Skin: She is not diaphoretic.       Assessment/Plan:  Iris is here for follow-up of ADHD, which is well controlled on current treatment plan. Continue on current medication regimen. Regular follow-up in 3 months, sooner if any changes in mood, behavior.     Problem List Items Addressed This Visit        Psychiatric    Adult ADHD    Relevant Medications    dextroamphetamine-amphetamine (ADDERALL XR) 25 MG 24 hr capsule (Start on 9/16/2018)    dextroamphetamine-amphetamine (ADDERALL XR) 30 MG 24 hr capsule (Start on 9/16/2018)      Other Visit Diagnoses     Screening, lipid    -  Primary    Relevant Orders    Lipid panel    Screening for diabetes mellitus        Relevant Orders    Comprehensive metabolic panel

## 2018-10-15 ENCOUNTER — OFFICE VISIT (OUTPATIENT)
Dept: FAMILY MEDICINE | Facility: CLINIC | Age: 33
End: 2018-10-15
Payer: MEDICAID

## 2018-10-15 VITALS
OXYGEN SATURATION: 98 % | SYSTOLIC BLOOD PRESSURE: 132 MMHG | WEIGHT: 118.31 LBS | BODY MASS INDEX: 23.85 KG/M2 | DIASTOLIC BLOOD PRESSURE: 78 MMHG | RESPIRATION RATE: 20 BRPM | HEART RATE: 108 BPM | HEIGHT: 59 IN | TEMPERATURE: 98 F

## 2018-10-15 DIAGNOSIS — Z23 NEED FOR INFLUENZA VACCINATION: Primary | ICD-10-CM

## 2018-10-15 DIAGNOSIS — F90.9 ADULT ADHD: ICD-10-CM

## 2018-10-15 PROCEDURE — 90471 IMMUNIZATION ADMIN: CPT | Mod: ,,, | Performed by: INTERNAL MEDICINE

## 2018-10-15 PROCEDURE — 90686 IIV4 VACC NO PRSV 0.5 ML IM: CPT | Mod: ,,, | Performed by: INTERNAL MEDICINE

## 2018-10-15 PROCEDURE — 99213 OFFICE O/P EST LOW 20 MIN: CPT | Mod: 25,,, | Performed by: INTERNAL MEDICINE

## 2018-10-15 RX ORDER — DEXTROAMPHETAMINE SACCHARATE, AMPHETAMINE ASPARTATE MONOHYDRATE, DEXTROAMPHETAMINE SULFATE AND AMPHETAMINE SULFATE 6.25; 6.25; 6.25; 6.25 MG/1; MG/1; MG/1; MG/1
25 CAPSULE, EXTENDED RELEASE ORAL EVERY MORNING
Qty: 30 CAPSULE | Refills: 0 | Status: SHIPPED | OUTPATIENT
Start: 2018-10-15 | End: 2018-10-15 | Stop reason: SDUPTHER

## 2018-10-15 RX ORDER — DEXTROAMPHETAMINE SACCHARATE, AMPHETAMINE ASPARTATE MONOHYDRATE, DEXTROAMPHETAMINE SULFATE AND AMPHETAMINE SULFATE 7.5; 7.5; 7.5; 7.5 MG/1; MG/1; MG/1; MG/1
30 CAPSULE, EXTENDED RELEASE ORAL DAILY
Qty: 30 CAPSULE | Refills: 0 | Status: SHIPPED | OUTPATIENT
Start: 2018-12-15 | End: 2019-01-15 | Stop reason: SDUPTHER

## 2018-10-15 RX ORDER — DEXTROAMPHETAMINE SACCHARATE, AMPHETAMINE ASPARTATE MONOHYDRATE, DEXTROAMPHETAMINE SULFATE AND AMPHETAMINE SULFATE 7.5; 7.5; 7.5; 7.5 MG/1; MG/1; MG/1; MG/1
30 CAPSULE, EXTENDED RELEASE ORAL DAILY
Qty: 30 CAPSULE | Refills: 0 | Status: SHIPPED | OUTPATIENT
Start: 2018-10-15 | End: 2018-10-15 | Stop reason: SDUPTHER

## 2018-10-15 RX ORDER — DEXTROAMPHETAMINE SACCHARATE, AMPHETAMINE ASPARTATE MONOHYDRATE, DEXTROAMPHETAMINE SULFATE AND AMPHETAMINE SULFATE 6.25; 6.25; 6.25; 6.25 MG/1; MG/1; MG/1; MG/1
25 CAPSULE, EXTENDED RELEASE ORAL EVERY MORNING
Qty: 30 CAPSULE | Refills: 0 | Status: SHIPPED | OUTPATIENT
Start: 2018-12-15 | End: 2019-01-15 | Stop reason: SDUPTHER

## 2018-10-15 RX ORDER — FLUOCINONIDE 0.5 MG/G
1 CREAM TOPICAL 2 TIMES DAILY
Refills: 3 | COMMUNITY
Start: 2018-07-19 | End: 2019-05-16

## 2018-10-15 RX ORDER — DEXTROAMPHETAMINE SACCHARATE, AMPHETAMINE ASPARTATE MONOHYDRATE, DEXTROAMPHETAMINE SULFATE AND AMPHETAMINE SULFATE 6.25; 6.25; 6.25; 6.25 MG/1; MG/1; MG/1; MG/1
25 CAPSULE, EXTENDED RELEASE ORAL EVERY MORNING
Qty: 30 CAPSULE | Refills: 0 | Status: SHIPPED | OUTPATIENT
Start: 2018-11-15 | End: 2018-10-15 | Stop reason: SDUPTHER

## 2018-10-15 RX ORDER — DEXTROAMPHETAMINE SACCHARATE, AMPHETAMINE ASPARTATE MONOHYDRATE, DEXTROAMPHETAMINE SULFATE AND AMPHETAMINE SULFATE 7.5; 7.5; 7.5; 7.5 MG/1; MG/1; MG/1; MG/1
30 CAPSULE, EXTENDED RELEASE ORAL DAILY
Qty: 30 CAPSULE | Refills: 0 | Status: SHIPPED | OUTPATIENT
Start: 2018-11-15 | End: 2018-10-15 | Stop reason: SDUPTHER

## 2018-10-15 NOTE — PROGRESS NOTES
"Follow up ADHD visit    HPI: Iris Blanchard is a 33 y.o. female with ADHD here for follow up and refill of her medication. She  has been doing well. No significant complaints.      ROS: No tics, dull affect, headache, stomachache, irritability, tearfulness, sadness, does not feel socially withdrawn, no hallucinations, no loss of appetite, no trouble sleeping. No other side effects reported today.    Past Medical History:   Diagnosis Date    ADHD (attention deficit hyperactivity disorder)     Anxiety          Current Outpatient Medications:     cetirizine (ZYRTEC) 10 MG tablet, Take 10 mg by mouth once daily., Disp: , Rfl:     clindamycin (CLEOCIN T) 1 % external solution, APPLY TO SKIN BID, Disp: , Rfl: 5    [START ON 12/15/2018] dextroamphetamine-amphetamine (ADDERALL XR) 25 MG 24 hr capsule, Take 1 capsule (25 mg total) by mouth every morning., Disp: 30 capsule, Rfl: 0    [START ON 12/15/2018] dextroamphetamine-amphetamine (ADDERALL XR) 30 MG 24 hr capsule, Take 1 capsule (30 mg total) by mouth once daily., Disp: 30 capsule, Rfl: 0    fluocinonide 0.05% (LIDEX) 0.05 % cream, Apply 1 each topically 2 (two) times daily., Disp: , Rfl: 3    NICORELIEF 4 mg Gum, , Disp: , Rfl: 4    spironolactone (ALDACTONE) 25 MG tablet, TK 1 T PO BID, Disp: , Rfl: 3    Exam:  Vitals:    10/15/18 1014   BP: 132/78   Pulse: 108   Resp: 20   Temp: 98.2 °F (36.8 °C)   SpO2: 98%   Weight: 53.7 kg (118 lb 4.8 oz)   Height: 4' 11" (1.499 m)     Physical Exam   Constitutional: She appears well-developed and well-nourished. No distress.   HENT:   Head: Normocephalic and atraumatic.   Nose: Nose normal.   Mouth/Throat: Oropharynx is clear and moist and mucous membranes are normal.   Eyes: Conjunctivae are normal. Pupils are equal, round, and reactive to light.   Cardiovascular: Normal rate, regular rhythm, normal heart sounds and intact distal pulses.   No murmur heard.  Pulmonary/Chest: Effort normal and breath sounds normal. She " has no wheezes. She has no rales.   Musculoskeletal: She exhibits no edema.   Skin: She is not diaphoretic.       Assessment/Plan:  Iris is here for follow-up of ADHD, which is well controlled on current treatment plan. Continue on current medication regimen. Regular follow-up in 3 months, sooner if any changes in mood, behavior or development of any tics.     Problem List Items Addressed This Visit        Psychiatric    Adult ADHD    Relevant Medications    dextroamphetamine-amphetamine (ADDERALL XR) 25 MG 24 hr capsule (Start on 12/15/2018)    dextroamphetamine-amphetamine (ADDERALL XR) 30 MG 24 hr capsule (Start on 12/15/2018)      Other Visit Diagnoses     Need for influenza vaccination    -  Primary    Relevant Orders    Influenza - Quadrivalent (3 years & older) (PF) (Completed)

## 2018-11-27 DIAGNOSIS — J30.2 SEASONAL ALLERGIES: Primary | ICD-10-CM

## 2018-11-27 RX ORDER — CETIRIZINE HYDROCHLORIDE 10 MG/1
10 TABLET ORAL DAILY
Qty: 30 TABLET | Refills: 5 | Status: SHIPPED | OUTPATIENT
Start: 2018-11-27 | End: 2019-01-15 | Stop reason: SDUPTHER

## 2019-01-15 ENCOUNTER — OFFICE VISIT (OUTPATIENT)
Dept: FAMILY MEDICINE | Facility: CLINIC | Age: 34
End: 2019-01-15
Payer: MEDICAID

## 2019-01-15 VITALS
BODY MASS INDEX: 23.59 KG/M2 | HEIGHT: 59 IN | HEART RATE: 113 BPM | RESPIRATION RATE: 18 BRPM | DIASTOLIC BLOOD PRESSURE: 88 MMHG | OXYGEN SATURATION: 100 % | SYSTOLIC BLOOD PRESSURE: 136 MMHG | WEIGHT: 117 LBS

## 2019-01-15 DIAGNOSIS — J30.2 SEASONAL ALLERGIES: ICD-10-CM

## 2019-01-15 DIAGNOSIS — F90.9 ADULT ADHD: Primary | ICD-10-CM

## 2019-01-15 PROCEDURE — 99213 OFFICE O/P EST LOW 20 MIN: CPT | Mod: ,,, | Performed by: INTERNAL MEDICINE

## 2019-01-15 PROCEDURE — 99213 PR OFFICE/OUTPT VISIT, EST, LEVL III, 20-29 MIN: ICD-10-PCS | Mod: ,,, | Performed by: INTERNAL MEDICINE

## 2019-01-15 RX ORDER — DEXTROAMPHETAMINE SACCHARATE, AMPHETAMINE ASPARTATE MONOHYDRATE, DEXTROAMPHETAMINE SULFATE AND AMPHETAMINE SULFATE 6.25; 6.25; 6.25; 6.25 MG/1; MG/1; MG/1; MG/1
25 CAPSULE, EXTENDED RELEASE ORAL EVERY MORNING
Qty: 30 CAPSULE | Refills: 0 | Status: SHIPPED | OUTPATIENT
Start: 2019-01-15 | End: 2019-01-15 | Stop reason: SDUPTHER

## 2019-01-15 RX ORDER — DEXTROAMPHETAMINE SACCHARATE, AMPHETAMINE ASPARTATE MONOHYDRATE, DEXTROAMPHETAMINE SULFATE AND AMPHETAMINE SULFATE 6.25; 6.25; 6.25; 6.25 MG/1; MG/1; MG/1; MG/1
25 CAPSULE, EXTENDED RELEASE ORAL EVERY MORNING
Qty: 30 CAPSULE | Refills: 0 | Status: SHIPPED | OUTPATIENT
Start: 2019-03-15 | End: 2019-04-16 | Stop reason: SDUPTHER

## 2019-01-15 RX ORDER — DEXTROAMPHETAMINE SACCHARATE, AMPHETAMINE ASPARTATE MONOHYDRATE, DEXTROAMPHETAMINE SULFATE AND AMPHETAMINE SULFATE 7.5; 7.5; 7.5; 7.5 MG/1; MG/1; MG/1; MG/1
30 CAPSULE, EXTENDED RELEASE ORAL DAILY
Qty: 30 CAPSULE | Refills: 0 | Status: SHIPPED | OUTPATIENT
Start: 2019-01-15 | End: 2019-01-15 | Stop reason: SDUPTHER

## 2019-01-15 RX ORDER — DEXTROAMPHETAMINE SACCHARATE, AMPHETAMINE ASPARTATE MONOHYDRATE, DEXTROAMPHETAMINE SULFATE AND AMPHETAMINE SULFATE 7.5; 7.5; 7.5; 7.5 MG/1; MG/1; MG/1; MG/1
30 CAPSULE, EXTENDED RELEASE ORAL DAILY
Qty: 30 CAPSULE | Refills: 0 | Status: SHIPPED | OUTPATIENT
Start: 2019-02-15 | End: 2019-01-15 | Stop reason: SDUPTHER

## 2019-01-15 RX ORDER — DEXTROAMPHETAMINE SACCHARATE, AMPHETAMINE ASPARTATE MONOHYDRATE, DEXTROAMPHETAMINE SULFATE AND AMPHETAMINE SULFATE 7.5; 7.5; 7.5; 7.5 MG/1; MG/1; MG/1; MG/1
30 CAPSULE, EXTENDED RELEASE ORAL DAILY
Qty: 30 CAPSULE | Refills: 0 | Status: SHIPPED | OUTPATIENT
Start: 2019-03-15 | End: 2019-04-16 | Stop reason: SDUPTHER

## 2019-01-15 RX ORDER — DEXTROAMPHETAMINE SACCHARATE, AMPHETAMINE ASPARTATE MONOHYDRATE, DEXTROAMPHETAMINE SULFATE AND AMPHETAMINE SULFATE 6.25; 6.25; 6.25; 6.25 MG/1; MG/1; MG/1; MG/1
25 CAPSULE, EXTENDED RELEASE ORAL EVERY MORNING
Qty: 30 CAPSULE | Refills: 0 | Status: SHIPPED | OUTPATIENT
Start: 2019-02-15 | End: 2019-01-15 | Stop reason: SDUPTHER

## 2019-01-15 RX ORDER — CETIRIZINE HYDROCHLORIDE 10 MG/1
10 TABLET ORAL DAILY
Qty: 30 TABLET | Refills: 5 | Status: SHIPPED | OUTPATIENT
Start: 2019-01-15 | End: 2019-12-24

## 2019-01-15 NOTE — PROGRESS NOTES
"Follow up ADHD visit    HPI: Iris Blanchard is a 33 y.o. female with ADHD here for follow up and refill of her medication. She is doing well on current medication regimen which includes dextroamphetamine/amphetamine (Adderall).  She is managing symptoms at work, school and home. She is under a lot of stress due to her son's issues as well as sister's recent cancer dx.     ROS: No chest pain, palpitations, tics, dull affect, headache, stomachache, irritability, tearfulness, sadness, hallucinations, no loss of appetite, no trouble sleeping. No other side effects reported today.    Past Medical History:   Diagnosis Date    ADHD (attention deficit hyperactivity disorder)     Anxiety          Current Outpatient Medications:     cetirizine (ZYRTEC) 10 MG tablet, Take 1 tablet (10 mg total) by mouth once daily., Disp: 30 tablet, Rfl: 5    clindamycin (CLEOCIN T) 1 % external solution, APPLY TO SKIN BID, Disp: , Rfl: 5    [START ON 3/15/2019] dextroamphetamine-amphetamine (ADDERALL XR) 25 MG 24 hr capsule, Take 1 capsule (25 mg total) by mouth every morning., Disp: 30 capsule, Rfl: 0    [START ON 3/15/2019] dextroamphetamine-amphetamine (ADDERALL XR) 30 MG 24 hr capsule, Take 1 capsule (30 mg total) by mouth once daily., Disp: 30 capsule, Rfl: 0    NICORELIEF 4 mg Gum, , Disp: , Rfl: 4    spironolactone (ALDACTONE) 25 MG tablet, TK 1 T PO BID, Disp: , Rfl: 3    fluocinonide 0.05% (LIDEX) 0.05 % cream, Apply 1 each topically 2 (two) times daily., Disp: , Rfl: 3    Exam:  Vitals:    01/15/19 1021   BP: 136/88   Pulse: (!) 113   Resp: 18   SpO2: 100%   Weight: 53.1 kg (117 lb)   Height: 4' 11" (1.499 m)     Physical Exam   Constitutional: She appears well-developed and well-nourished. No distress.   HENT:   Head: Normocephalic and atraumatic.   Nose: Nose normal.   Mouth/Throat: Oropharynx is clear and moist and mucous membranes are normal.   Eyes: Conjunctivae are normal. Pupils are equal, round, and reactive to " light.   Cardiovascular: Normal rate, regular rhythm, normal heart sounds and intact distal pulses.   No murmur heard.  Pulmonary/Chest: Effort normal and breath sounds normal. She has no wheezes. She has no rales.   Musculoskeletal: She exhibits no edema.   Skin: She is not diaphoretic.       Assessment/Plan:  Iris is here for follow-up of ADHD, which is well controlled on current treatment plan. Continue on current medication regimen. Regular follow-up in 3 months, sooner if any changes in mood, behavior, declining grades or development of any tics.     Problem List Items Addressed This Visit        Psychiatric    Adult ADHD - Primary    Relevant Medications    dextroamphetamine-amphetamine (ADDERALL XR) 25 MG 24 hr capsule (Start on 3/15/2019)    dextroamphetamine-amphetamine (ADDERALL XR) 30 MG 24 hr capsule (Start on 3/15/2019)      Other Visit Diagnoses     Seasonal allergies        Relevant Medications    cetirizine (ZYRTEC) 10 MG tablet

## 2019-04-15 DIAGNOSIS — F90.9 ADULT ADHD: ICD-10-CM

## 2019-04-15 NOTE — TELEPHONE ENCOUNTER
Attempted to contact patient to clarify, no answer voicemail box full unable to leave msg will try back.

## 2019-04-15 NOTE — TELEPHONE ENCOUNTER
----- Message from Sarah Beth Melendez sent at 4/15/2019  9:09 AM CDT -----  Contact: [phone]  Patient called stating she missed her appointment last Thursday because she was out of town. I made her an appointment for next Monday, April 22nd at 9:30AM. Her insurance will not pay for the name brand Adderall anymore, but she can not take the generic. She asked if medical neccessary can be put on the prescription to get name brand.

## 2019-04-16 RX ORDER — DEXTROAMPHETAMINE SACCHARATE, AMPHETAMINE ASPARTATE MONOHYDRATE, DEXTROAMPHETAMINE SULFATE AND AMPHETAMINE SULFATE 6.25; 6.25; 6.25; 6.25 MG/1; MG/1; MG/1; MG/1
25 CAPSULE, EXTENDED RELEASE ORAL EVERY MORNING
Qty: 30 CAPSULE | Refills: 0 | Status: SHIPPED | OUTPATIENT
Start: 2019-04-16 | End: 2019-05-16 | Stop reason: SDUPTHER

## 2019-04-16 RX ORDER — DEXTROAMPHETAMINE SACCHARATE, AMPHETAMINE ASPARTATE MONOHYDRATE, DEXTROAMPHETAMINE SULFATE AND AMPHETAMINE SULFATE 7.5; 7.5; 7.5; 7.5 MG/1; MG/1; MG/1; MG/1
30 CAPSULE, EXTENDED RELEASE ORAL DAILY
Qty: 30 CAPSULE | Refills: 0 | Status: SHIPPED | OUTPATIENT
Start: 2019-04-16 | End: 2019-05-16 | Stop reason: SDUPTHER

## 2019-05-16 ENCOUNTER — OFFICE VISIT (OUTPATIENT)
Dept: FAMILY MEDICINE | Facility: CLINIC | Age: 34
End: 2019-05-16
Payer: MEDICAID

## 2019-05-16 VITALS
DIASTOLIC BLOOD PRESSURE: 62 MMHG | HEIGHT: 59 IN | WEIGHT: 113 LBS | HEART RATE: 89 BPM | BODY MASS INDEX: 22.78 KG/M2 | OXYGEN SATURATION: 96 % | SYSTOLIC BLOOD PRESSURE: 120 MMHG

## 2019-05-16 DIAGNOSIS — Z13.1 SCREENING FOR DIABETES MELLITUS: ICD-10-CM

## 2019-05-16 DIAGNOSIS — Z86.2 H/O: IRON DEFICIENCY ANEMIA: ICD-10-CM

## 2019-05-16 DIAGNOSIS — Z13.220 SCREENING, LIPID: Primary | ICD-10-CM

## 2019-05-16 DIAGNOSIS — F90.9 ADULT ADHD: ICD-10-CM

## 2019-05-16 LAB
ALBUMIN SERPL-MCNC: 4.1 G/DL (ref 3.1–4.7)
ALP SERPL-CCNC: 69 IU/L (ref 40–104)
ALT (SGPT): 26 IU/L (ref 3–33)
AST SERPL-CCNC: 22 IU/L (ref 10–40)
BASOPHILS NFR BLD: 0 K/UL (ref 0–0.2)
BASOPHILS NFR BLD: 0.2 %
BILIRUB SERPL-MCNC: 0.8 MG/DL (ref 0.3–1)
BUN SERPL-MCNC: 16 MG/DL (ref 8–20)
CALCIUM SERPL-MCNC: 8.8 MG/DL (ref 7.7–10.4)
CHLORIDE: 103 MMOL/L (ref 98–110)
CO2 SERPL-SCNC: 27.1 MMOL/L (ref 22.8–31.6)
CREATININE: 0.81 MG/DL (ref 0.6–1.4)
EOSINOPHIL NFR BLD: 0.2 K/UL (ref 0–0.7)
EOSINOPHIL NFR BLD: 2.2 %
ERYTHROCYTE [DISTWIDTH] IN BLOOD BY AUTOMATED COUNT: 13 % (ref 11.7–14.9)
GLUCOSE: 99 MG/DL (ref 70–99)
GRAN #: 7.8 K/UL (ref 1.4–6.5)
GRAN%: 75.2 %
HCT VFR BLD AUTO: 42.4 % (ref 36–48)
HGB BLD-MCNC: 14.3 G/DL (ref 12–15)
IMMATURE GRANS (ABS): 0.1 K/UL (ref 0–1)
IMMATURE GRANULOCYTES: 0.5 %
LYMPH #: 1.6 K/UL (ref 1.2–3.4)
LYMPH%: 15.2 %
MCH RBC QN AUTO: 35.6 PG (ref 25–35)
MCHC RBC AUTO-ENTMCNC: 33.7 G/DL (ref 31–36)
MCV RBC AUTO: 105.5 FL (ref 79–98)
MONO #: 0.7 K/UL (ref 0.1–0.6)
MONO%: 6.7 %
NUCLEATED RBCS: 0 %
PLATELET # BLD AUTO: 258 K/UL (ref 140–440)
PMV BLD AUTO: 9.3 FL (ref 8.8–12.7)
POTASSIUM SERPL-SCNC: 3.9 MMOL/L (ref 3.5–5)
PROT SERPL-MCNC: 7.3 G/DL (ref 6–8.2)
RBC # BLD AUTO: 4.02 M/UL (ref 3.5–5.5)
SODIUM: 136 MMOL/L (ref 134–144)
WBC # BLD AUTO: 10.4 K/UL (ref 5–10)

## 2019-05-16 PROCEDURE — 99213 PR OFFICE/OUTPT VISIT, EST, LEVL III, 20-29 MIN: ICD-10-PCS | Mod: ,,, | Performed by: INTERNAL MEDICINE

## 2019-05-16 PROCEDURE — 99213 OFFICE O/P EST LOW 20 MIN: CPT | Mod: ,,, | Performed by: INTERNAL MEDICINE

## 2019-05-16 RX ORDER — DEXTROAMPHETAMINE SACCHARATE, AMPHETAMINE ASPARTATE MONOHYDRATE, DEXTROAMPHETAMINE SULFATE AND AMPHETAMINE SULFATE 7.5; 7.5; 7.5; 7.5 MG/1; MG/1; MG/1; MG/1
30 CAPSULE, EXTENDED RELEASE ORAL DAILY
Qty: 30 CAPSULE | Refills: 0 | Status: SHIPPED | OUTPATIENT
Start: 2019-05-16 | End: 2019-08-20 | Stop reason: SDUPTHER

## 2019-05-16 RX ORDER — METRONIDAZOLE 7.5 MG/G
1 GEL VAGINAL 2 TIMES DAILY
COMMUNITY
End: 2019-08-20

## 2019-05-16 RX ORDER — DEXTROAMPHETAMINE SACCHARATE, AMPHETAMINE ASPARTATE MONOHYDRATE, DEXTROAMPHETAMINE SULFATE AND AMPHETAMINE SULFATE 6.25; 6.25; 6.25; 6.25 MG/1; MG/1; MG/1; MG/1
25 CAPSULE, EXTENDED RELEASE ORAL EVERY MORNING
Qty: 30 CAPSULE | Refills: 0 | Status: SHIPPED | OUTPATIENT
Start: 2019-05-16 | End: 2019-08-20 | Stop reason: SDUPTHER

## 2019-05-16 RX ORDER — DEXTROAMPHETAMINE SACCHARATE, AMPHETAMINE ASPARTATE MONOHYDRATE, DEXTROAMPHETAMINE SULFATE AND AMPHETAMINE SULFATE 6.25; 6.25; 6.25; 6.25 MG/1; MG/1; MG/1; MG/1
25 CAPSULE, EXTENDED RELEASE ORAL EVERY MORNING
Qty: 30 CAPSULE | Refills: 0 | Status: SHIPPED | OUTPATIENT
Start: 2019-07-16 | End: 2019-07-18 | Stop reason: SDUPTHER

## 2019-05-16 RX ORDER — DEXTROAMPHETAMINE SACCHARATE, AMPHETAMINE ASPARTATE MONOHYDRATE, DEXTROAMPHETAMINE SULFATE AND AMPHETAMINE SULFATE 6.25; 6.25; 6.25; 6.25 MG/1; MG/1; MG/1; MG/1
25 CAPSULE, EXTENDED RELEASE ORAL EVERY MORNING
Qty: 30 CAPSULE | Refills: 0 | Status: SHIPPED | OUTPATIENT
Start: 2019-06-16 | End: 2019-08-20 | Stop reason: SDUPTHER

## 2019-05-16 RX ORDER — DEXTROAMPHETAMINE SACCHARATE, AMPHETAMINE ASPARTATE MONOHYDRATE, DEXTROAMPHETAMINE SULFATE AND AMPHETAMINE SULFATE 7.5; 7.5; 7.5; 7.5 MG/1; MG/1; MG/1; MG/1
30 CAPSULE, EXTENDED RELEASE ORAL EVERY MORNING
Qty: 30 CAPSULE | Refills: 0 | Status: SHIPPED | OUTPATIENT
Start: 2019-06-16 | End: 2019-08-20 | Stop reason: SDUPTHER

## 2019-05-16 RX ORDER — DEXTROAMPHETAMINE SACCHARATE, AMPHETAMINE ASPARTATE MONOHYDRATE, DEXTROAMPHETAMINE SULFATE AND AMPHETAMINE SULFATE 7.5; 7.5; 7.5; 7.5 MG/1; MG/1; MG/1; MG/1
30 CAPSULE, EXTENDED RELEASE ORAL EVERY MORNING
Qty: 30 CAPSULE | Refills: 0 | Status: SHIPPED | OUTPATIENT
Start: 2019-07-16 | End: 2019-07-18 | Stop reason: SDUPTHER

## 2019-05-16 NOTE — PROGRESS NOTES
Follow up ADHD visit    HPI: Iris Blanchard is a 34 y.o. female with ADHD here for follow up and refill of her medication. She is doing well on current medication regimen which includes dextroamphetamine/amphetamine (Adderall XR 55mg daily).  She is managing symptoms at work and home.     ROS: No chest pain, palpitations, tics, dull affect, headache, stomachache, irritability, tearfulness, sadness, hallucinations, no loss of appetite, no trouble sleeping. No other side effects reported today.    Past Medical History:   Diagnosis Date    ADHD (attention deficit hyperactivity disorder)     Anxiety          Current Outpatient Medications:     cetirizine (ZYRTEC) 10 MG tablet, Take 1 tablet (10 mg total) by mouth once daily., Disp: 30 tablet, Rfl: 5    clindamycin (CLEOCIN T) 1 % external solution, APPLY TO SKIN BID, Disp: , Rfl: 5    dextroamphetamine-amphetamine (ADDERALL XR) 25 MG 24 hr capsule, Take 1 capsule (25 mg total) by mouth every morning., Disp: 30 capsule, Rfl: 0    dextroamphetamine-amphetamine (ADDERALL XR) 30 MG 24 hr capsule, Take 1 capsule (30 mg total) by mouth once daily., Disp: 30 capsule, Rfl: 0    spironolactone (ALDACTONE) 25 MG tablet, TK 1 T PO BID, Disp: , Rfl: 3    [START ON 6/16/2019] dextroamphetamine-amphetamine (ADDERALL XR) 25 MG 24 hr capsule, Take 1 capsule (25 mg total) by mouth every morning. Take with 30mg capsule, Disp: 30 capsule, Rfl: 0    [START ON 7/16/2019] dextroamphetamine-amphetamine (ADDERALL XR) 25 MG 24 hr capsule, Take 1 capsule (25 mg total) by mouth every morning., Disp: 30 capsule, Rfl: 0    [START ON 6/16/2019] dextroamphetamine-amphetamine (ADDERALL XR) 30 MG 24 hr capsule, Take 1 capsule (30 mg total) by mouth every morning. Take with 25mg capsule, Disp: 30 capsule, Rfl: 0    [START ON 7/16/2019] dextroamphetamine-amphetamine (ADDERALL XR) 30 MG 24 hr capsule, Take 1 capsule (30 mg total) by mouth every morning., Disp: 30 capsule, Rfl: 0     "metroNIDAZOLE (METROGEL) 0.75 % vaginal gel, Place 1 applicator vaginally 2 (two) times daily., Disp: , Rfl:     Exam:  Vitals:    05/16/19 0855   BP: 120/62   Pulse: 89   SpO2: 96%   Weight: 51.3 kg (113 lb)   Height: 4' 11" (1.499 m)     Physical Exam   Constitutional: She is oriented to person, place, and time. She appears well-developed and well-nourished. No distress.   HENT:   Right Ear: External ear normal.   Left Ear: External ear normal.   Nose: Nose normal.   Mouth/Throat: Oropharynx is clear and moist and mucous membranes are normal.   Eyes: Pupils are equal, round, and reactive to light. Conjunctivae are normal. Right eye exhibits no discharge. Left eye exhibits no discharge.   Cardiovascular: Normal rate, regular rhythm, normal heart sounds and intact distal pulses.   No murmur heard.  Pulmonary/Chest: Effort normal and breath sounds normal. No respiratory distress. She has no wheezes. She has no rales.   Musculoskeletal: She exhibits no edema.   Neurological: She is alert and oriented to person, place, and time.   Skin: She is not diaphoretic.       Assessment/Plan:  Iris is here for follow-up of ADHD, which is well controlled on current treatment plan. Continue on current medication regimen. Regular follow-up in 3 months, sooner if any changes in mood, behavior,  or development of any tics.     Problem List Items Addressed This Visit        Psychiatric    Adult ADHD    Relevant Medications    dextroamphetamine-amphetamine (ADDERALL XR) 25 MG 24 hr capsule    dextroamphetamine-amphetamine (ADDERALL XR) 30 MG 24 hr capsule    dextroamphetamine-amphetamine (ADDERALL XR) 25 MG 24 hr capsule (Start on 6/16/2019)    dextroamphetamine-amphetamine (ADDERALL XR) 30 MG 24 hr capsule (Start on 6/16/2019)    dextroamphetamine-amphetamine (ADDERALL XR) 30 MG 24 hr capsule (Start on 7/16/2019)    dextroamphetamine-amphetamine (ADDERALL XR) 25 MG 24 hr capsule (Start on 7/16/2019)      Other Visit Diagnoses     " Screening, lipid    -  Primary    Relevant Orders    Lipid panel    Screening for diabetes mellitus        Relevant Orders    Comprehensive metabolic panel (Completed)    H/O: iron deficiency anemia        Relevant Orders    CBC auto differential (Completed)

## 2019-05-21 ENCOUNTER — TELEPHONE (OUTPATIENT)
Dept: PEDIATRICS | Facility: CLINIC | Age: 34
End: 2019-05-21

## 2019-05-21 DIAGNOSIS — D75.89 MACROCYTOSIS WITHOUT ANEMIA: Primary | ICD-10-CM

## 2019-05-21 NOTE — TELEPHONE ENCOUNTER
Labs showed macrocytosis (large cells) w/o anemia.  Will check for b12 and folate deficiency, as well as thyroid disease. Labs ordered.

## 2019-05-23 NOTE — TELEPHONE ENCOUNTER
Left msg for patient checking to see if she was able to fill her medications. I let her know she had some abnormalities on her lab work so Dr. Olivares wants to run additional labs. I advised her to call me back and let me know where she wants them done.

## 2019-06-11 LAB
BASOPHILS NFR BLD: 0 K/UL (ref 0–0.2)
BASOPHILS NFR BLD: 0.2 %
EOSINOPHIL NFR BLD: 0.2 K/UL (ref 0–0.7)
EOSINOPHIL NFR BLD: 2.3 %
ERYTHROCYTE [DISTWIDTH] IN BLOOD BY AUTOMATED COUNT: 12.1 % (ref 11.7–14.9)
GRAN #: 6.3 K/UL (ref 1.4–6.5)
GRAN%: 63 %
HCT VFR BLD AUTO: 40.7 % (ref 36–48)
HGB BLD-MCNC: 14 G/DL (ref 12–15)
IMMATURE GRANS (ABS): 0.1 K/UL (ref 0–1)
IMMATURE GRANULOCYTES: 0.6 %
LYMPH #: 2.7 K/UL (ref 1.2–3.4)
LYMPH%: 27.2 %
MCH RBC QN AUTO: 35.4 PG (ref 25–35)
MCHC RBC AUTO-ENTMCNC: 34.4 G/DL (ref 31–36)
MCV RBC AUTO: 102.8 FL (ref 79–98)
MONO #: 0.7 K/UL (ref 0.1–0.6)
MONO%: 6.7 %
NUCLEATED RBCS: 0 %
PLATELET # BLD AUTO: 243 K/UL (ref 140–440)
PMV BLD AUTO: 9.4 FL (ref 8.8–12.7)
RBC # BLD AUTO: 3.96 M/UL (ref 3.5–5.5)
TSH SERPL DL<=0.005 MIU/L-ACNC: 4.76 ULU/ML (ref 0.3–5.6)
VITAMIN B12: 913 PG/ML (ref 62–940)
WBC # BLD AUTO: 9.9 K/UL (ref 5–10)

## 2019-06-18 ENCOUNTER — TELEPHONE (OUTPATIENT)
Dept: FAMILY MEDICINE | Facility: CLINIC | Age: 34
End: 2019-06-18

## 2019-06-19 ENCOUNTER — PATIENT MESSAGE (OUTPATIENT)
Dept: FAMILY MEDICINE | Facility: CLINIC | Age: 34
End: 2019-06-19

## 2019-06-21 ENCOUNTER — PATIENT MESSAGE (OUTPATIENT)
Dept: PEDIATRICS | Facility: CLINIC | Age: 34
End: 2019-06-21

## 2019-06-21 ENCOUNTER — TELEPHONE (OUTPATIENT)
Dept: PEDIATRICS | Facility: CLINIC | Age: 34
End: 2019-06-21

## 2019-06-21 DIAGNOSIS — D75.89 MACROCYTOSIS WITHOUT ANEMIA: Primary | ICD-10-CM

## 2019-06-27 ENCOUNTER — TELEPHONE (OUTPATIENT)
Dept: FAMILY MEDICINE | Facility: CLINIC | Age: 34
End: 2019-06-27

## 2019-06-27 DIAGNOSIS — D75.89 MACROCYTOSIS WITHOUT ANEMIA: Primary | ICD-10-CM

## 2019-07-01 ENCOUNTER — PATIENT MESSAGE (OUTPATIENT)
Dept: FAMILY MEDICINE | Facility: CLINIC | Age: 34
End: 2019-07-01

## 2019-07-03 LAB
HCYS SERPL-SCNC: 7.7 UMOL/L
METHYLMALONATE SERPL-SCNC: 109 NMOL/L (ref 87–318)

## 2019-07-10 NOTE — TELEPHONE ENCOUNTER
Results are normal. Likely normal variant (normal for her) but I can refer to hematology for further evaluation.

## 2019-07-15 ENCOUNTER — PATIENT MESSAGE (OUTPATIENT)
Dept: FAMILY MEDICINE | Facility: CLINIC | Age: 34
End: 2019-07-15

## 2019-07-18 DIAGNOSIS — F90.9 ADULT ADHD: ICD-10-CM

## 2019-07-18 RX ORDER — DEXTROAMPHETAMINE SACCHARATE, AMPHETAMINE ASPARTATE MONOHYDRATE, DEXTROAMPHETAMINE SULFATE AND AMPHETAMINE SULFATE 6.25; 6.25; 6.25; 6.25 MG/1; MG/1; MG/1; MG/1
25 CAPSULE, EXTENDED RELEASE ORAL EVERY MORNING
Qty: 30 CAPSULE | Refills: 0 | Status: SHIPPED | OUTPATIENT
Start: 2019-07-18 | End: 2019-08-20 | Stop reason: SDUPTHER

## 2019-07-18 RX ORDER — DEXTROAMPHETAMINE SACCHARATE, AMPHETAMINE ASPARTATE MONOHYDRATE, DEXTROAMPHETAMINE SULFATE AND AMPHETAMINE SULFATE 7.5; 7.5; 7.5; 7.5 MG/1; MG/1; MG/1; MG/1
30 CAPSULE, EXTENDED RELEASE ORAL EVERY MORNING
Qty: 30 CAPSULE | Refills: 0 | Status: SHIPPED | OUTPATIENT
Start: 2019-07-18 | End: 2019-08-20 | Stop reason: SDUPTHER

## 2019-08-20 ENCOUNTER — OFFICE VISIT (OUTPATIENT)
Dept: FAMILY MEDICINE | Facility: CLINIC | Age: 34
End: 2019-08-20
Payer: MEDICAID

## 2019-08-20 VITALS
DIASTOLIC BLOOD PRESSURE: 72 MMHG | OXYGEN SATURATION: 98 % | HEART RATE: 86 BPM | WEIGHT: 114.13 LBS | SYSTOLIC BLOOD PRESSURE: 100 MMHG | BODY MASS INDEX: 23.01 KG/M2 | RESPIRATION RATE: 20 BRPM | HEIGHT: 59 IN

## 2019-08-20 DIAGNOSIS — F90.9 ADULT ADHD: ICD-10-CM

## 2019-08-20 DIAGNOSIS — R09.82 POST-NASAL DRIP: ICD-10-CM

## 2019-08-20 DIAGNOSIS — Z72.0 TOBACCO ABUSE: ICD-10-CM

## 2019-08-20 PROCEDURE — 99213 OFFICE O/P EST LOW 20 MIN: CPT | Mod: S$PBB,,, | Performed by: INTERNAL MEDICINE

## 2019-08-20 PROCEDURE — 99999 PR PBB SHADOW E&M-EST. PATIENT-LVL III: CPT | Mod: PBBFAC,,, | Performed by: INTERNAL MEDICINE

## 2019-08-20 PROCEDURE — 99999 PR PBB SHADOW E&M-EST. PATIENT-LVL III: ICD-10-PCS | Mod: PBBFAC,,, | Performed by: INTERNAL MEDICINE

## 2019-08-20 PROCEDURE — 99213 OFFICE O/P EST LOW 20 MIN: CPT | Mod: PBBFAC | Performed by: INTERNAL MEDICINE

## 2019-08-20 PROCEDURE — 99213 PR OFFICE/OUTPT VISIT, EST, LEVL III, 20-29 MIN: ICD-10-PCS | Mod: S$PBB,,, | Performed by: INTERNAL MEDICINE

## 2019-08-20 RX ORDER — DEXTROAMPHETAMINE SACCHARATE, AMPHETAMINE ASPARTATE MONOHYDRATE, DEXTROAMPHETAMINE SULFATE AND AMPHETAMINE SULFATE 7.5; 7.5; 7.5; 7.5 MG/1; MG/1; MG/1; MG/1
30 CAPSULE, EXTENDED RELEASE ORAL DAILY
Qty: 30 CAPSULE | Refills: 0 | Status: SHIPPED | OUTPATIENT
Start: 2019-10-20 | End: 2019-11-14 | Stop reason: SDUPTHER

## 2019-08-20 RX ORDER — FLUTICASONE PROPIONATE 50 MCG
1 SPRAY, SUSPENSION (ML) NASAL DAILY
Qty: 1 BOTTLE | Refills: 3 | Status: SHIPPED | OUTPATIENT
Start: 2019-08-20 | End: 2020-04-16 | Stop reason: SDUPTHER

## 2019-08-20 RX ORDER — DEXTROAMPHETAMINE SACCHARATE, AMPHETAMINE ASPARTATE MONOHYDRATE, DEXTROAMPHETAMINE SULFATE AND AMPHETAMINE SULFATE 7.5; 7.5; 7.5; 7.5 MG/1; MG/1; MG/1; MG/1
30 CAPSULE, EXTENDED RELEASE ORAL EVERY MORNING
Qty: 30 CAPSULE | Refills: 0 | Status: SHIPPED | OUTPATIENT
Start: 2019-10-20 | End: 2019-11-19 | Stop reason: SDUPTHER

## 2019-08-20 RX ORDER — DEXTROAMPHETAMINE SACCHARATE, AMPHETAMINE ASPARTATE MONOHYDRATE, DEXTROAMPHETAMINE SULFATE AND AMPHETAMINE SULFATE 6.25; 6.25; 6.25; 6.25 MG/1; MG/1; MG/1; MG/1
25 CAPSULE, EXTENDED RELEASE ORAL EVERY MORNING
Qty: 30 CAPSULE | Refills: 0 | Status: SHIPPED | OUTPATIENT
Start: 2019-09-20 | End: 2019-11-19 | Stop reason: SDUPTHER

## 2019-08-20 RX ORDER — DEXTROAMPHETAMINE SACCHARATE, AMPHETAMINE ASPARTATE MONOHYDRATE, DEXTROAMPHETAMINE SULFATE AND AMPHETAMINE SULFATE 6.25; 6.25; 6.25; 6.25 MG/1; MG/1; MG/1; MG/1
25 CAPSULE, EXTENDED RELEASE ORAL EVERY MORNING
Qty: 30 CAPSULE | Refills: 0 | Status: SHIPPED | OUTPATIENT
Start: 2019-09-20 | End: 2019-11-14 | Stop reason: SDUPTHER

## 2019-08-20 RX ORDER — DEXTROAMPHETAMINE SACCHARATE, AMPHETAMINE ASPARTATE MONOHYDRATE, DEXTROAMPHETAMINE SULFATE AND AMPHETAMINE SULFATE 6.25; 6.25; 6.25; 6.25 MG/1; MG/1; MG/1; MG/1
25 CAPSULE, EXTENDED RELEASE ORAL EVERY MORNING
Qty: 30 CAPSULE | Refills: 0 | Status: SHIPPED | OUTPATIENT
Start: 2019-08-20 | End: 2019-11-19 | Stop reason: SDUPTHER

## 2019-08-20 RX ORDER — DEXTROAMPHETAMINE SACCHARATE, AMPHETAMINE ASPARTATE MONOHYDRATE, DEXTROAMPHETAMINE SULFATE AND AMPHETAMINE SULFATE 7.5; 7.5; 7.5; 7.5 MG/1; MG/1; MG/1; MG/1
30 CAPSULE, EXTENDED RELEASE ORAL EVERY MORNING
Qty: 30 CAPSULE | Refills: 0 | Status: SHIPPED | OUTPATIENT
Start: 2019-08-20 | End: 2019-11-19 | Stop reason: SDUPTHER

## 2019-08-20 RX ORDER — MICONAZOLE NITRATE 2 %
2 CREAM (GRAM) TOPICAL
Qty: 100 EACH | Refills: 5 | Status: SHIPPED | OUTPATIENT
Start: 2019-08-20 | End: 2020-11-20 | Stop reason: SDUPTHER

## 2019-08-20 RX ORDER — IBUPROFEN 200 MG
1 TABLET ORAL DAILY
Qty: 28 PATCH | Refills: 1 | Status: SHIPPED | OUTPATIENT
Start: 2019-08-20 | End: 2021-04-22

## 2019-08-20 NOTE — ASSESSMENT & PLAN NOTE
Assistance with smoking cessation was offered, including:  [x]  Medications  [x]  Counseling  []  Printed Information on Smoking Cessation  []  Referral to a Smoking Cessation Program    Patient was counseled regarding smoking for 3-10 minutes.

## 2019-08-20 NOTE — PROGRESS NOTES
Follow up ADHD visit    HPI: Iris Blanchard is a 34 y.o. female with ADHD here for follow up and refill of her medication. She is doing well on current medication regimen which includes dextroamphetamine/amphetamine (Adderall).  She is managing symptoms at work, school and home.     ROS: No chest pain, palpitations, tics, dull affect, headache, stomachache, irritability, tearfulness, sadness, hallucinations, no loss of appetite, no trouble sleeping. No other side effects reported today.  +post nasal drip, mild cough.    Past Medical History:   Diagnosis Date    ADHD (attention deficit hyperactivity disorder)     Anxiety          Current Outpatient Medications:     cetirizine (ZYRTEC) 10 MG tablet, Take 1 tablet (10 mg total) by mouth once daily., Disp: 30 tablet, Rfl: 5    spironolactone (ALDACTONE) 25 MG tablet, TK 1 T PO BID, Disp: , Rfl: 3    clindamycin (CLEOCIN T) 1 % external solution, APPLY TO SKIN BID, Disp: , Rfl: 5    [START ON 9/20/2019] dextroamphetamine-amphetamine (ADDERALL XR) 25 MG 24 hr capsule, Take 1 capsule (25 mg total) by mouth every morning. With 30 mg xr. Brand name medically necessary., Disp: 30 capsule, Rfl: 0    [START ON 9/20/2019] dextroamphetamine-amphetamine (ADDERALL XR) 25 MG 24 hr capsule, Take 1 capsule (25 mg total) by mouth every morning. Take with 30mg capsule Brand name medically necessary, Disp: 30 capsule, Rfl: 0    dextroamphetamine-amphetamine (ADDERALL XR) 25 MG 24 hr capsule, Take 1 capsule (25 mg total) by mouth every morning. Brand Name medically necessary, Disp: 30 capsule, Rfl: 0    [START ON 10/20/2019] dextroamphetamine-amphetamine (ADDERALL XR) 30 MG 24 hr capsule, Take 1 capsule (30 mg total) by mouth once daily. Take with 25mg xr. Brand Name medically necessary., Disp: 30 capsule, Rfl: 0    [START ON 10/20/2019] dextroamphetamine-amphetamine (ADDERALL XR) 30 MG 24 hr capsule, Take 1 capsule (30 mg total) by mouth every morning. Take with 25mg capsule.  "Brand name medically necessary., Disp: 30 capsule, Rfl: 0    dextroamphetamine-amphetamine (ADDERALL XR) 30 MG 24 hr capsule, Take 1 capsule (30 mg total) by mouth every morning. Brand name medically necessary, Disp: 30 capsule, Rfl: 0    fluticasone propionate (FLONASE) 50 mcg/actuation nasal spray, 1 spray (50 mcg total) by Each Nostril route once daily., Disp: 1 Bottle, Rfl: 3    nicotine (NICODERM CQ) 14 mg/24 hr, Place 1 patch onto the skin once daily., Disp: 28 patch, Rfl: 1    nicotine, polacrilex, (NICORETTE) 2 mg Gum, Take 1 each (2 mg total) by mouth every 2 (two) hours as needed (craving)., Disp: 100 each, Rfl: 5    Exam:  Vitals:    08/20/19 1011   BP: 100/72   Pulse: 86   Resp: 20   SpO2: 98%   Weight: 51.8 kg (114 lb 1.6 oz)   Height: 4' 11" (1.499 m)     Physical Exam   Constitutional: She is oriented to person, place, and time. She appears well-developed and well-nourished. No distress.   HENT:   Right Ear: Tympanic membrane and external ear normal.   Left Ear: Tympanic membrane and external ear normal.   Nose: Mucosal edema present. Right sinus exhibits no maxillary sinus tenderness and no frontal sinus tenderness. Left sinus exhibits no maxillary sinus tenderness and no frontal sinus tenderness.   Mouth/Throat: Oropharynx is clear and moist and mucous membranes are normal. No oropharyngeal exudate or posterior oropharyngeal erythema.   Eyes: Pupils are equal, round, and reactive to light. Conjunctivae are normal. Right eye exhibits no discharge. Left eye exhibits no discharge.   Cardiovascular: Normal rate, regular rhythm, normal heart sounds and intact distal pulses.   No murmur heard.  Pulmonary/Chest: Effort normal and breath sounds normal. No respiratory distress. She has no wheezes. She has no rales.   Musculoskeletal: She exhibits no edema.   Lymphadenopathy:     She has no cervical adenopathy.   Neurological: She is alert and oriented to person, place, and time.   Skin: She is not " diaphoretic.       Assessment/Plan:  Iris is here for follow-up of ADHD, which is well controlled on current treatment plan. Continue on current medication regimen. Regular follow-up in 3 months, sooner if any changes in mood, behavior, declining grades or development of any tics.     Problem List Items Addressed This Visit        Psychiatric    Adult ADHD    Relevant Medications    dextroamphetamine-amphetamine (ADDERALL XR) 30 MG 24 hr capsule (Start on 10/20/2019)    dextroamphetamine-amphetamine (ADDERALL XR) 30 MG 24 hr capsule (Start on 10/20/2019)    dextroamphetamine-amphetamine (ADDERALL XR) 25 MG 24 hr capsule (Start on 9/20/2019)    dextroamphetamine-amphetamine (ADDERALL XR) 25 MG 24 hr capsule (Start on 9/20/2019)    dextroamphetamine-amphetamine (ADDERALL XR) 25 MG 24 hr capsule    dextroamphetamine-amphetamine (ADDERALL XR) 30 MG 24 hr capsule       ENT    Post-nasal drip    Relevant Medications    fluticasone propionate (FLONASE) 50 mcg/actuation nasal spray       Other    Tobacco abuse    Current Assessment & Plan     Assistance with smoking cessation was offered, including:  [x]  Medications  [x]  Counseling  []  Printed Information on Smoking Cessation  []  Referral to a Smoking Cessation Program    Patient was counseled regarding smoking for 3-10 minutes.           Relevant Medications    nicotine, polacrilex, (NICORETTE) 2 mg Gum    nicotine (NICODERM CQ) 14 mg/24 hr

## 2019-11-14 DIAGNOSIS — F90.9 ADULT ADHD: ICD-10-CM

## 2019-11-18 RX ORDER — DEXTROAMPHETAMINE SACCHARATE, AMPHETAMINE ASPARTATE MONOHYDRATE, DEXTROAMPHETAMINE SULFATE AND AMPHETAMINE SULFATE 6.25; 6.25; 6.25; 6.25 MG/1; MG/1; MG/1; MG/1
25 CAPSULE, EXTENDED RELEASE ORAL EVERY MORNING
Qty: 30 CAPSULE | Refills: 0 | Status: SHIPPED | OUTPATIENT
Start: 2019-11-18 | End: 2020-01-20 | Stop reason: SDUPTHER

## 2019-11-18 RX ORDER — DEXTROAMPHETAMINE SACCHARATE, AMPHETAMINE ASPARTATE MONOHYDRATE, DEXTROAMPHETAMINE SULFATE AND AMPHETAMINE SULFATE 7.5; 7.5; 7.5; 7.5 MG/1; MG/1; MG/1; MG/1
30 CAPSULE, EXTENDED RELEASE ORAL DAILY
Qty: 30 CAPSULE | Refills: 0 | Status: SHIPPED | OUTPATIENT
Start: 2019-11-18 | End: 2020-01-20 | Stop reason: SDUPTHER

## 2019-11-19 ENCOUNTER — OFFICE VISIT (OUTPATIENT)
Dept: FAMILY MEDICINE | Facility: CLINIC | Age: 34
End: 2019-11-19
Payer: MEDICAID

## 2019-11-19 VITALS
TEMPERATURE: 98 F | HEART RATE: 68 BPM | RESPIRATION RATE: 16 BRPM | BODY MASS INDEX: 23.21 KG/M2 | SYSTOLIC BLOOD PRESSURE: 112 MMHG | DIASTOLIC BLOOD PRESSURE: 83 MMHG | HEIGHT: 59 IN | WEIGHT: 115.13 LBS | OXYGEN SATURATION: 100 %

## 2019-11-19 DIAGNOSIS — Z23 NEED FOR INFLUENZA VACCINATION: ICD-10-CM

## 2019-11-19 DIAGNOSIS — G44.229 CHRONIC TENSION-TYPE HEADACHE, NOT INTRACTABLE: ICD-10-CM

## 2019-11-19 DIAGNOSIS — F90.9 ADULT ADHD: Primary | ICD-10-CM

## 2019-11-19 PROCEDURE — 99214 OFFICE O/P EST MOD 30 MIN: CPT | Mod: S$PBB,,, | Performed by: INTERNAL MEDICINE

## 2019-11-19 PROCEDURE — 99214 PR OFFICE/OUTPT VISIT, EST, LEVL IV, 30-39 MIN: ICD-10-PCS | Mod: S$PBB,,, | Performed by: INTERNAL MEDICINE

## 2019-11-19 PROCEDURE — 90471 IMMUNIZATION ADMIN: CPT | Mod: PBBFAC | Performed by: INTERNAL MEDICINE

## 2019-11-19 PROCEDURE — 99214 OFFICE O/P EST MOD 30 MIN: CPT | Performed by: INTERNAL MEDICINE

## 2019-11-19 RX ORDER — DEXTROAMPHETAMINE SACCHARATE, AMPHETAMINE ASPARTATE MONOHYDRATE, DEXTROAMPHETAMINE SULFATE AND AMPHETAMINE SULFATE 7.5; 7.5; 7.5; 7.5 MG/1; MG/1; MG/1; MG/1
30 CAPSULE, EXTENDED RELEASE ORAL EVERY MORNING
Qty: 30 CAPSULE | Refills: 0 | Status: SHIPPED | OUTPATIENT
Start: 2019-12-18 | End: 2020-01-16 | Stop reason: SDUPTHER

## 2019-11-19 RX ORDER — NAPROXEN 500 MG/1
500 TABLET ORAL 2 TIMES DAILY WITH MEALS
Qty: 14 TABLET | Refills: 0 | Status: SHIPPED | OUTPATIENT
Start: 2019-11-19 | End: 2019-11-26

## 2019-11-19 RX ORDER — DEXTROAMPHETAMINE SACCHARATE, AMPHETAMINE ASPARTATE MONOHYDRATE, DEXTROAMPHETAMINE SULFATE AND AMPHETAMINE SULFATE 6.25; 6.25; 6.25; 6.25 MG/1; MG/1; MG/1; MG/1
25 CAPSULE, EXTENDED RELEASE ORAL EVERY MORNING
Qty: 30 CAPSULE | Refills: 0 | Status: SHIPPED | OUTPATIENT
Start: 2020-01-17 | End: 2020-01-20 | Stop reason: SDUPTHER

## 2019-11-19 RX ORDER — VALACYCLOVIR HYDROCHLORIDE 500 MG/1
TABLET, FILM COATED ORAL
Refills: 10 | COMMUNITY
Start: 2019-10-04

## 2019-11-19 RX ORDER — DEXTROAMPHETAMINE SACCHARATE, AMPHETAMINE ASPARTATE MONOHYDRATE, DEXTROAMPHETAMINE SULFATE AND AMPHETAMINE SULFATE 6.25; 6.25; 6.25; 6.25 MG/1; MG/1; MG/1; MG/1
25 CAPSULE, EXTENDED RELEASE ORAL EVERY MORNING
Qty: 30 CAPSULE | Refills: 0 | Status: SHIPPED | OUTPATIENT
Start: 2019-12-18 | End: 2020-01-16 | Stop reason: SDUPTHER

## 2019-11-19 RX ORDER — DEXTROAMPHETAMINE SACCHARATE, AMPHETAMINE ASPARTATE MONOHYDRATE, DEXTROAMPHETAMINE SULFATE AND AMPHETAMINE SULFATE 7.5; 7.5; 7.5; 7.5 MG/1; MG/1; MG/1; MG/1
30 CAPSULE, EXTENDED RELEASE ORAL EVERY MORNING
Qty: 30 CAPSULE | Refills: 0 | Status: SHIPPED | OUTPATIENT
Start: 2020-01-17 | End: 2020-01-20 | Stop reason: SDUPTHER
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
## 2019-11-19 NOTE — ASSESSMENT & PLAN NOTE
Likely component of rebound headaches at this point.  Rx sent for 1 week of scheduled naproxen.  Advised patient to avoid medications including over-the-counter medications more than 2 times per week for headache.  Monitor caffeine intake is this is likely a big contributor for this patient.  Advised patient to download a headache romie to track her headaches and apparent triggers.

## 2019-12-24 DIAGNOSIS — J30.2 SEASONAL ALLERGIES: ICD-10-CM

## 2019-12-24 RX ORDER — CETIRIZINE HYDROCHLORIDE 10 MG/1
TABLET ORAL
Qty: 30 TABLET | Refills: 5 | Status: SHIPPED | OUTPATIENT
Start: 2019-12-24 | End: 2020-10-21 | Stop reason: SDUPTHER

## 2020-01-16 DIAGNOSIS — F90.9 ADULT ADHD: ICD-10-CM

## 2020-01-16 RX ORDER — DEXTROAMPHETAMINE SACCHARATE, AMPHETAMINE ASPARTATE MONOHYDRATE, DEXTROAMPHETAMINE SULFATE AND AMPHETAMINE SULFATE 7.5; 7.5; 7.5; 7.5 MG/1; MG/1; MG/1; MG/1
30 CAPSULE, EXTENDED RELEASE ORAL EVERY MORNING
Qty: 30 CAPSULE | Refills: 0 | Status: SHIPPED | OUTPATIENT
Start: 2020-01-16 | End: 2020-04-16 | Stop reason: SDUPTHER

## 2020-01-16 RX ORDER — DEXTROAMPHETAMINE SACCHARATE, AMPHETAMINE ASPARTATE MONOHYDRATE, DEXTROAMPHETAMINE SULFATE AND AMPHETAMINE SULFATE 6.25; 6.25; 6.25; 6.25 MG/1; MG/1; MG/1; MG/1
25 CAPSULE, EXTENDED RELEASE ORAL EVERY MORNING
Qty: 30 CAPSULE | Refills: 0 | Status: SHIPPED | OUTPATIENT
Start: 2020-01-16 | End: 2020-04-16 | Stop reason: SDUPTHER

## 2020-01-20 ENCOUNTER — OFFICE VISIT (OUTPATIENT)
Dept: FAMILY MEDICINE | Facility: CLINIC | Age: 35
End: 2020-01-20
Payer: MEDICAID

## 2020-01-20 VITALS
WEIGHT: 122.31 LBS | DIASTOLIC BLOOD PRESSURE: 80 MMHG | SYSTOLIC BLOOD PRESSURE: 122 MMHG | HEART RATE: 75 BPM | OXYGEN SATURATION: 100 % | RESPIRATION RATE: 18 BRPM | BODY MASS INDEX: 24.66 KG/M2 | HEIGHT: 59 IN

## 2020-01-20 DIAGNOSIS — F90.9 ADULT ADHD: ICD-10-CM

## 2020-01-20 DIAGNOSIS — Z72.0 TOBACCO ABUSE: Primary | ICD-10-CM

## 2020-01-20 DIAGNOSIS — M54.41 ACUTE RIGHT-SIDED LOW BACK PAIN WITH RIGHT-SIDED SCIATICA: ICD-10-CM

## 2020-01-20 DIAGNOSIS — M54.16 LUMBAR RADICULOPATHY: ICD-10-CM

## 2020-01-20 PROCEDURE — 99213 PR OFFICE/OUTPT VISIT, EST, LEVL III, 20-29 MIN: ICD-10-PCS | Mod: S$PBB,,, | Performed by: INTERNAL MEDICINE

## 2020-01-20 PROCEDURE — 99214 OFFICE O/P EST MOD 30 MIN: CPT | Performed by: INTERNAL MEDICINE

## 2020-01-20 PROCEDURE — 99213 OFFICE O/P EST LOW 20 MIN: CPT | Mod: S$PBB,,, | Performed by: INTERNAL MEDICINE

## 2020-01-20 RX ORDER — DEXTROAMPHETAMINE SACCHARATE, AMPHETAMINE ASPARTATE MONOHYDRATE, DEXTROAMPHETAMINE SULFATE AND AMPHETAMINE SULFATE 7.5; 7.5; 7.5; 7.5 MG/1; MG/1; MG/1; MG/1
30 CAPSULE, EXTENDED RELEASE ORAL EVERY MORNING
Qty: 30 CAPSULE | Refills: 0 | Status: SHIPPED | OUTPATIENT
Start: 2020-03-16 | End: 2020-05-19

## 2020-01-20 RX ORDER — GABAPENTIN 300 MG/1
300 CAPSULE ORAL 3 TIMES DAILY
Qty: 90 CAPSULE | Refills: 3 | Status: SHIPPED | OUTPATIENT
Start: 2020-01-20 | End: 2020-11-20 | Stop reason: SDUPTHER

## 2020-01-20 RX ORDER — TRAMADOL HYDROCHLORIDE 50 MG/1
TABLET ORAL
COMMUNITY
Start: 2020-01-16 | End: 2021-04-22

## 2020-01-20 RX ORDER — DEXTROAMPHETAMINE SACCHARATE, AMPHETAMINE ASPARTATE MONOHYDRATE, DEXTROAMPHETAMINE SULFATE AND AMPHETAMINE SULFATE 6.25; 6.25; 6.25; 6.25 MG/1; MG/1; MG/1; MG/1
25 CAPSULE, EXTENDED RELEASE ORAL EVERY MORNING
Qty: 30 CAPSULE | Refills: 0 | Status: SHIPPED | OUTPATIENT
Start: 2020-02-17 | End: 2020-05-19

## 2020-01-20 RX ORDER — DEXTROAMPHETAMINE SACCHARATE, AMPHETAMINE ASPARTATE MONOHYDRATE, DEXTROAMPHETAMINE SULFATE AND AMPHETAMINE SULFATE 6.25; 6.25; 6.25; 6.25 MG/1; MG/1; MG/1; MG/1
25 CAPSULE, EXTENDED RELEASE ORAL EVERY MORNING
Qty: 30 CAPSULE | Refills: 0 | Status: SHIPPED | OUTPATIENT
Start: 2020-03-16 | End: 2020-05-19

## 2020-01-20 RX ORDER — NAPROXEN 500 MG/1
TABLET ORAL
COMMUNITY
Start: 2020-01-16 | End: 2021-04-22 | Stop reason: SDUPTHER

## 2020-01-20 RX ORDER — DEXTROAMPHETAMINE SACCHARATE, AMPHETAMINE ASPARTATE MONOHYDRATE, DEXTROAMPHETAMINE SULFATE AND AMPHETAMINE SULFATE 7.5; 7.5; 7.5; 7.5 MG/1; MG/1; MG/1; MG/1
30 CAPSULE, EXTENDED RELEASE ORAL DAILY
Qty: 30 CAPSULE | Refills: 0 | Status: SHIPPED | OUTPATIENT
Start: 2020-02-17 | End: 2020-05-18 | Stop reason: SDUPTHER

## 2020-01-20 NOTE — ASSESSMENT & PLAN NOTE
With new acute symptoms consistent with lumbar radiculopathy (pain radiating to foot, +straight leg raise, paresthesias of R leg), will obtain updated MRI. Referred to Dr. Fuentes for further evaluation and management. Continue scheduled NSAIDS, PRN cyclobenzaprine. Add gabapentin.
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
## 2020-02-07 ENCOUNTER — TELEPHONE (OUTPATIENT)
Dept: SPINE | Facility: CLINIC | Age: 35
End: 2020-02-07

## 2020-02-07 ENCOUNTER — TELEPHONE (OUTPATIENT)
Dept: PEDIATRICS | Facility: CLINIC | Age: 35
End: 2020-02-07

## 2020-02-07 NOTE — TELEPHONE ENCOUNTER
Spoke with patient about completing the MRI and letting her know she only ordered the MRI of lumbar spine.

## 2020-04-02 ENCOUNTER — PATIENT MESSAGE (OUTPATIENT)
Dept: FAMILY MEDICINE | Facility: CLINIC | Age: 35
End: 2020-04-02

## 2020-04-16 ENCOUNTER — PATIENT MESSAGE (OUTPATIENT)
Dept: FAMILY MEDICINE | Facility: CLINIC | Age: 35
End: 2020-04-16

## 2020-04-16 ENCOUNTER — OFFICE VISIT (OUTPATIENT)
Dept: FAMILY MEDICINE | Facility: CLINIC | Age: 35
End: 2020-04-16
Payer: MEDICAID

## 2020-04-16 DIAGNOSIS — Z72.0 TOBACCO ABUSE: ICD-10-CM

## 2020-04-16 DIAGNOSIS — S20.212D CONTUSION OF RIB ON LEFT SIDE, SUBSEQUENT ENCOUNTER: ICD-10-CM

## 2020-04-16 DIAGNOSIS — F90.9 ADULT ADHD: Primary | ICD-10-CM

## 2020-04-16 DIAGNOSIS — F90.9 ADULT ADHD: ICD-10-CM

## 2020-04-16 DIAGNOSIS — R09.82 POST-NASAL DRIP: ICD-10-CM

## 2020-04-16 PROBLEM — S20.212A CONTUSION OF RIB ON LEFT SIDE: Status: ACTIVE | Noted: 2020-04-16

## 2020-04-16 PROCEDURE — 99214 OFFICE O/P EST MOD 30 MIN: CPT | Mod: 95,,, | Performed by: INTERNAL MEDICINE

## 2020-04-16 PROCEDURE — 99214 PR OFFICE/OUTPT VISIT, EST, LEVL IV, 30-39 MIN: ICD-10-PCS | Mod: 95,,, | Performed by: INTERNAL MEDICINE

## 2020-04-16 RX ORDER — DEXTROAMPHETAMINE SACCHARATE, AMPHETAMINE ASPARTATE MONOHYDRATE, DEXTROAMPHETAMINE SULFATE AND AMPHETAMINE SULFATE 7.5; 7.5; 7.5; 7.5 MG/1; MG/1; MG/1; MG/1
30 CAPSULE, EXTENDED RELEASE ORAL EVERY MORNING
Qty: 30 CAPSULE | Refills: 0 | Status: SHIPPED | OUTPATIENT
Start: 2020-04-16 | End: 2020-05-19

## 2020-04-16 RX ORDER — DEXTROAMPHETAMINE SACCHARATE, AMPHETAMINE ASPARTATE MONOHYDRATE, DEXTROAMPHETAMINE SULFATE AND AMPHETAMINE SULFATE 6.25; 6.25; 6.25; 6.25 MG/1; MG/1; MG/1; MG/1
25 CAPSULE, EXTENDED RELEASE ORAL EVERY MORNING
Qty: 30 CAPSULE | Refills: 0 | Status: SHIPPED | OUTPATIENT
Start: 2020-04-16 | End: 2020-05-18 | Stop reason: SDUPTHER

## 2020-04-16 RX ORDER — FLUTICASONE PROPIONATE 50 MCG
1 SPRAY, SUSPENSION (ML) NASAL DAILY
Qty: 16 G | Refills: 11 | Status: SHIPPED | OUTPATIENT
Start: 2020-04-16 | End: 2021-04-22 | Stop reason: SDUPTHER
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
## 2020-05-16 ENCOUNTER — PATIENT MESSAGE (OUTPATIENT)
Dept: FAMILY MEDICINE | Facility: CLINIC | Age: 35
End: 2020-05-16

## 2020-05-18 ENCOUNTER — PATIENT MESSAGE (OUTPATIENT)
Dept: FAMILY MEDICINE | Facility: CLINIC | Age: 35
End: 2020-05-18

## 2020-05-18 DIAGNOSIS — F90.9 ADULT ADHD: ICD-10-CM

## 2020-05-19 RX ORDER — DEXTROAMPHETAMINE SACCHARATE, AMPHETAMINE ASPARTATE MONOHYDRATE, DEXTROAMPHETAMINE SULFATE AND AMPHETAMINE SULFATE 7.5; 7.5; 7.5; 7.5 MG/1; MG/1; MG/1; MG/1
30 CAPSULE, EXTENDED RELEASE ORAL DAILY
Qty: 30 CAPSULE | Refills: 0 | Status: SHIPPED | OUTPATIENT
Start: 2020-05-19 | End: 2020-06-19 | Stop reason: SDUPTHER

## 2020-05-19 RX ORDER — DEXTROAMPHETAMINE SACCHARATE, AMPHETAMINE ASPARTATE MONOHYDRATE, DEXTROAMPHETAMINE SULFATE AND AMPHETAMINE SULFATE 6.25; 6.25; 6.25; 6.25 MG/1; MG/1; MG/1; MG/1
25 CAPSULE, EXTENDED RELEASE ORAL EVERY MORNING
Qty: 30 CAPSULE | Refills: 0 | Status: SHIPPED | OUTPATIENT
Start: 2020-05-19 | End: 2020-06-19 | Stop reason: SDUPTHER

## 2020-06-19 DIAGNOSIS — F90.9 ADULT ADHD: ICD-10-CM

## 2020-06-19 RX ORDER — DEXTROAMPHETAMINE SACCHARATE, AMPHETAMINE ASPARTATE MONOHYDRATE, DEXTROAMPHETAMINE SULFATE AND AMPHETAMINE SULFATE 6.25; 6.25; 6.25; 6.25 MG/1; MG/1; MG/1; MG/1
25 CAPSULE, EXTENDED RELEASE ORAL EVERY MORNING
Qty: 30 CAPSULE | Refills: 0 | Status: SHIPPED | OUTPATIENT
Start: 2020-06-19 | End: 2020-07-20 | Stop reason: SDUPTHER

## 2020-06-19 RX ORDER — DEXTROAMPHETAMINE SACCHARATE, AMPHETAMINE ASPARTATE MONOHYDRATE, DEXTROAMPHETAMINE SULFATE AND AMPHETAMINE SULFATE 7.5; 7.5; 7.5; 7.5 MG/1; MG/1; MG/1; MG/1
30 CAPSULE, EXTENDED RELEASE ORAL DAILY
Qty: 30 CAPSULE | Refills: 0 | Status: SHIPPED | OUTPATIENT
Start: 2020-06-19 | End: 2020-07-20 | Stop reason: SDUPTHER

## 2020-07-20 DIAGNOSIS — F90.9 ADULT ADHD: ICD-10-CM

## 2020-07-20 RX ORDER — DEXTROAMPHETAMINE SACCHARATE, AMPHETAMINE ASPARTATE MONOHYDRATE, DEXTROAMPHETAMINE SULFATE AND AMPHETAMINE SULFATE 7.5; 7.5; 7.5; 7.5 MG/1; MG/1; MG/1; MG/1
30 CAPSULE, EXTENDED RELEASE ORAL DAILY
Qty: 30 CAPSULE | Refills: 0 | Status: SHIPPED | OUTPATIENT
Start: 2020-07-20 | End: 2020-08-21 | Stop reason: SDUPTHER

## 2020-07-20 RX ORDER — DEXTROAMPHETAMINE SACCHARATE, AMPHETAMINE ASPARTATE MONOHYDRATE, DEXTROAMPHETAMINE SULFATE AND AMPHETAMINE SULFATE 6.25; 6.25; 6.25; 6.25 MG/1; MG/1; MG/1; MG/1
25 CAPSULE, EXTENDED RELEASE ORAL EVERY MORNING
Qty: 30 CAPSULE | Refills: 0 | Status: SHIPPED | OUTPATIENT
Start: 2020-07-20 | End: 2020-08-21 | Stop reason: SDUPTHER

## 2020-08-21 DIAGNOSIS — F90.9 ADULT ADHD: ICD-10-CM

## 2020-08-21 RX ORDER — DEXTROAMPHETAMINE SACCHARATE, AMPHETAMINE ASPARTATE MONOHYDRATE, DEXTROAMPHETAMINE SULFATE AND AMPHETAMINE SULFATE 7.5; 7.5; 7.5; 7.5 MG/1; MG/1; MG/1; MG/1
30 CAPSULE, EXTENDED RELEASE ORAL DAILY
Qty: 30 CAPSULE | Refills: 0 | Status: SHIPPED | OUTPATIENT
Start: 2020-08-21 | End: 2020-09-21 | Stop reason: SDUPTHER

## 2020-08-21 RX ORDER — DEXTROAMPHETAMINE SACCHARATE, AMPHETAMINE ASPARTATE MONOHYDRATE, DEXTROAMPHETAMINE SULFATE AND AMPHETAMINE SULFATE 6.25; 6.25; 6.25; 6.25 MG/1; MG/1; MG/1; MG/1
25 CAPSULE, EXTENDED RELEASE ORAL EVERY MORNING
Qty: 30 CAPSULE | Refills: 0 | Status: SHIPPED | OUTPATIENT
Start: 2020-08-21 | End: 2020-09-21 | Stop reason: SDUPTHER

## 2020-09-21 DIAGNOSIS — F90.9 ADULT ADHD: ICD-10-CM

## 2020-09-21 RX ORDER — DEXTROAMPHETAMINE SACCHARATE, AMPHETAMINE ASPARTATE MONOHYDRATE, DEXTROAMPHETAMINE SULFATE AND AMPHETAMINE SULFATE 6.25; 6.25; 6.25; 6.25 MG/1; MG/1; MG/1; MG/1
25 CAPSULE, EXTENDED RELEASE ORAL EVERY MORNING
Qty: 30 CAPSULE | Refills: 0 | Status: SHIPPED | OUTPATIENT
Start: 2020-09-21 | End: 2020-10-21 | Stop reason: SDUPTHER

## 2020-09-21 RX ORDER — DEXTROAMPHETAMINE SACCHARATE, AMPHETAMINE ASPARTATE MONOHYDRATE, DEXTROAMPHETAMINE SULFATE AND AMPHETAMINE SULFATE 7.5; 7.5; 7.5; 7.5 MG/1; MG/1; MG/1; MG/1
30 CAPSULE, EXTENDED RELEASE ORAL DAILY
Qty: 30 CAPSULE | Refills: 0 | Status: SHIPPED | OUTPATIENT
Start: 2020-09-21 | End: 2020-10-21 | Stop reason: SDUPTHER

## 2020-10-21 DIAGNOSIS — J30.2 SEASONAL ALLERGIES: ICD-10-CM

## 2020-10-21 DIAGNOSIS — F90.9 ADULT ADHD: ICD-10-CM

## 2020-10-21 RX ORDER — CETIRIZINE HYDROCHLORIDE 10 MG/1
10 TABLET ORAL DAILY
Qty: 30 TABLET | Refills: 5 | Status: SHIPPED | OUTPATIENT
Start: 2020-10-21 | End: 2021-02-18 | Stop reason: SDUPTHER

## 2020-10-21 RX ORDER — DEXTROAMPHETAMINE SACCHARATE, AMPHETAMINE ASPARTATE MONOHYDRATE, DEXTROAMPHETAMINE SULFATE AND AMPHETAMINE SULFATE 6.25; 6.25; 6.25; 6.25 MG/1; MG/1; MG/1; MG/1
25 CAPSULE, EXTENDED RELEASE ORAL EVERY MORNING
Qty: 30 CAPSULE | Refills: 0 | Status: SHIPPED | OUTPATIENT
Start: 2020-10-21 | End: 2020-11-20 | Stop reason: SDUPTHER

## 2020-10-21 RX ORDER — DEXTROAMPHETAMINE SACCHARATE, AMPHETAMINE ASPARTATE MONOHYDRATE, DEXTROAMPHETAMINE SULFATE AND AMPHETAMINE SULFATE 7.5; 7.5; 7.5; 7.5 MG/1; MG/1; MG/1; MG/1
30 CAPSULE, EXTENDED RELEASE ORAL DAILY
Qty: 30 CAPSULE | Refills: 0 | Status: SHIPPED | OUTPATIENT
Start: 2020-10-21 | End: 2020-11-20 | Stop reason: SDUPTHER

## 2020-11-20 ENCOUNTER — PATIENT MESSAGE (OUTPATIENT)
Dept: FAMILY MEDICINE | Facility: CLINIC | Age: 35
End: 2020-11-20

## 2020-11-20 DIAGNOSIS — Z72.0 TOBACCO ABUSE: ICD-10-CM

## 2020-11-20 DIAGNOSIS — F90.9 ADULT ADHD: ICD-10-CM

## 2020-11-20 DIAGNOSIS — M54.41 ACUTE RIGHT-SIDED LOW BACK PAIN WITH RIGHT-SIDED SCIATICA: ICD-10-CM

## 2020-11-20 DIAGNOSIS — M54.16 LUMBAR RADICULOPATHY: ICD-10-CM

## 2020-11-20 RX ORDER — DEXTROAMPHETAMINE SACCHARATE, AMPHETAMINE ASPARTATE MONOHYDRATE, DEXTROAMPHETAMINE SULFATE AND AMPHETAMINE SULFATE 6.25; 6.25; 6.25; 6.25 MG/1; MG/1; MG/1; MG/1
25 CAPSULE, EXTENDED RELEASE ORAL EVERY MORNING
Qty: 30 CAPSULE | Refills: 0 | Status: SHIPPED | OUTPATIENT
Start: 2020-11-20 | End: 2020-12-21 | Stop reason: SDUPTHER

## 2020-11-20 RX ORDER — MICONAZOLE NITRATE 2 %
2 CREAM (GRAM) TOPICAL
Qty: 100 EACH | Refills: 5 | Status: SHIPPED | OUTPATIENT
Start: 2020-11-20 | End: 2021-04-22

## 2020-11-20 RX ORDER — DEXTROAMPHETAMINE SACCHARATE, AMPHETAMINE ASPARTATE MONOHYDRATE, DEXTROAMPHETAMINE SULFATE AND AMPHETAMINE SULFATE 7.5; 7.5; 7.5; 7.5 MG/1; MG/1; MG/1; MG/1
30 CAPSULE, EXTENDED RELEASE ORAL DAILY
Qty: 30 CAPSULE | Refills: 0 | Status: SHIPPED | OUTPATIENT
Start: 2020-11-20 | End: 2020-12-21 | Stop reason: SDUPTHER

## 2020-11-20 RX ORDER — GABAPENTIN 300 MG/1
300 CAPSULE ORAL 3 TIMES DAILY
Qty: 90 CAPSULE | Refills: 3 | Status: SHIPPED | OUTPATIENT
Start: 2020-11-20 | End: 2021-07-16 | Stop reason: SDUPTHER

## 2020-11-23 ENCOUNTER — CLINICAL SUPPORT (OUTPATIENT)
Dept: FAMILY MEDICINE | Facility: CLINIC | Age: 35
End: 2020-11-23
Payer: MEDICAID

## 2020-11-23 DIAGNOSIS — Z23 NEED FOR INFLUENZA VACCINATION: Primary | ICD-10-CM

## 2020-11-23 PROCEDURE — 90471 IMMUNIZATION ADMIN: CPT | Mod: PBBFAC | Performed by: INTERNAL MEDICINE

## 2020-11-23 PROCEDURE — 99212 OFFICE O/P EST SF 10 MIN: CPT

## 2020-12-21 DIAGNOSIS — F90.9 ADULT ADHD: ICD-10-CM

## 2020-12-21 RX ORDER — DEXTROAMPHETAMINE SACCHARATE, AMPHETAMINE ASPARTATE MONOHYDRATE, DEXTROAMPHETAMINE SULFATE AND AMPHETAMINE SULFATE 7.5; 7.5; 7.5; 7.5 MG/1; MG/1; MG/1; MG/1
30 CAPSULE, EXTENDED RELEASE ORAL DAILY
Qty: 30 CAPSULE | Refills: 0 | Status: SHIPPED | OUTPATIENT
Start: 2020-12-21 | End: 2021-01-19 | Stop reason: SDUPTHER

## 2020-12-21 RX ORDER — DEXTROAMPHETAMINE SACCHARATE, AMPHETAMINE ASPARTATE MONOHYDRATE, DEXTROAMPHETAMINE SULFATE AND AMPHETAMINE SULFATE 6.25; 6.25; 6.25; 6.25 MG/1; MG/1; MG/1; MG/1
25 CAPSULE, EXTENDED RELEASE ORAL EVERY MORNING
Qty: 30 CAPSULE | Refills: 0 | Status: SHIPPED | OUTPATIENT
Start: 2020-12-21 | End: 2021-01-19 | Stop reason: SDUPTHER

## 2021-01-19 DIAGNOSIS — F90.9 ADULT ADHD: ICD-10-CM

## 2021-01-19 RX ORDER — DEXTROAMPHETAMINE SACCHARATE, AMPHETAMINE ASPARTATE MONOHYDRATE, DEXTROAMPHETAMINE SULFATE AND AMPHETAMINE SULFATE 7.5; 7.5; 7.5; 7.5 MG/1; MG/1; MG/1; MG/1
30 CAPSULE, EXTENDED RELEASE ORAL DAILY
Qty: 30 CAPSULE | Refills: 0 | Status: SHIPPED | OUTPATIENT
Start: 2021-01-19 | End: 2021-02-18 | Stop reason: SDUPTHER

## 2021-01-19 RX ORDER — DEXTROAMPHETAMINE SACCHARATE, AMPHETAMINE ASPARTATE MONOHYDRATE, DEXTROAMPHETAMINE SULFATE AND AMPHETAMINE SULFATE 6.25; 6.25; 6.25; 6.25 MG/1; MG/1; MG/1; MG/1
25 CAPSULE, EXTENDED RELEASE ORAL EVERY MORNING
Qty: 30 CAPSULE | Refills: 0 | Status: SHIPPED | OUTPATIENT
Start: 2021-01-19 | End: 2021-02-18 | Stop reason: SDUPTHER

## 2021-03-23 ENCOUNTER — PATIENT MESSAGE (OUTPATIENT)
Dept: FAMILY MEDICINE | Facility: CLINIC | Age: 36
End: 2021-03-23

## 2021-03-23 ENCOUNTER — TELEPHONE (OUTPATIENT)
Dept: DERMATOLOGY | Facility: CLINIC | Age: 36
End: 2021-03-23

## 2021-03-23 ENCOUNTER — TELEPHONE (OUTPATIENT)
Dept: FAMILY MEDICINE | Facility: CLINIC | Age: 36
End: 2021-03-23

## 2021-03-23 DIAGNOSIS — L70.9 ACNE, UNSPECIFIED ACNE TYPE: Primary | ICD-10-CM

## 2021-03-25 ENCOUNTER — TELEPHONE (OUTPATIENT)
Dept: FAMILY MEDICINE | Facility: CLINIC | Age: 36
End: 2021-03-25

## 2021-03-25 ENCOUNTER — PATIENT MESSAGE (OUTPATIENT)
Dept: FAMILY MEDICINE | Facility: CLINIC | Age: 36
End: 2021-03-25

## 2021-03-25 DIAGNOSIS — L70.9 ACNE, UNSPECIFIED ACNE TYPE: Primary | ICD-10-CM

## 2021-04-20 ENCOUNTER — PATIENT MESSAGE (OUTPATIENT)
Dept: FAMILY MEDICINE | Facility: CLINIC | Age: 36
End: 2021-04-20

## 2021-04-22 ENCOUNTER — OFFICE VISIT (OUTPATIENT)
Dept: FAMILY MEDICINE | Facility: CLINIC | Age: 36
End: 2021-04-22
Payer: MEDICAID

## 2021-04-22 VITALS
BODY MASS INDEX: 26.21 KG/M2 | OXYGEN SATURATION: 100 % | SYSTOLIC BLOOD PRESSURE: 110 MMHG | RESPIRATION RATE: 18 BRPM | WEIGHT: 130 LBS | HEIGHT: 59 IN | DIASTOLIC BLOOD PRESSURE: 70 MMHG | HEART RATE: 94 BPM

## 2021-04-22 DIAGNOSIS — R09.82 POST-NASAL DRIP: ICD-10-CM

## 2021-04-22 DIAGNOSIS — F90.9 ADULT ADHD: ICD-10-CM

## 2021-04-22 DIAGNOSIS — G44.229 CHRONIC TENSION-TYPE HEADACHE, NOT INTRACTABLE: Primary | ICD-10-CM

## 2021-04-22 DIAGNOSIS — J30.2 SEASONAL ALLERGIES: ICD-10-CM

## 2021-04-22 PROCEDURE — 99214 OFFICE O/P EST MOD 30 MIN: CPT | Mod: S$PBB,,, | Performed by: INTERNAL MEDICINE

## 2021-04-22 PROCEDURE — 99214 PR OFFICE/OUTPT VISIT, EST, LEVL IV, 30-39 MIN: ICD-10-PCS | Mod: S$PBB,,, | Performed by: INTERNAL MEDICINE

## 2021-04-22 PROCEDURE — 99214 OFFICE O/P EST MOD 30 MIN: CPT | Performed by: INTERNAL MEDICINE

## 2021-04-22 RX ORDER — FLUTICASONE PROPIONATE 50 MCG
1 SPRAY, SUSPENSION (ML) NASAL DAILY
Qty: 16 G | Refills: 11 | Status: SHIPPED | OUTPATIENT
Start: 2021-04-22 | End: 2021-09-28

## 2021-04-22 RX ORDER — NAPROXEN 500 MG/1
500 TABLET ORAL
Qty: 60 TABLET | Refills: 3 | Status: SHIPPED | OUTPATIENT
Start: 2021-04-22

## 2021-04-22 RX ORDER — CETIRIZINE HYDROCHLORIDE 10 MG/1
10 TABLET ORAL DAILY
Qty: 30 TABLET | Refills: 5 | Status: SHIPPED | OUTPATIENT
Start: 2021-04-22 | End: 2021-05-24 | Stop reason: SDUPTHER

## 2021-04-22 RX ORDER — DEXTROAMPHETAMINE SACCHARATE, AMPHETAMINE ASPARTATE MONOHYDRATE, DEXTROAMPHETAMINE SULFATE AND AMPHETAMINE SULFATE 6.25; 6.25; 6.25; 6.25 MG/1; MG/1; MG/1; MG/1
25 CAPSULE, EXTENDED RELEASE ORAL EVERY MORNING
Qty: 30 CAPSULE | Refills: 0 | Status: SHIPPED | OUTPATIENT
Start: 2021-04-22 | End: 2021-05-22 | Stop reason: SDUPTHER

## 2021-04-22 RX ORDER — DEXTROAMPHETAMINE SACCHARATE, AMPHETAMINE ASPARTATE MONOHYDRATE, DEXTROAMPHETAMINE SULFATE AND AMPHETAMINE SULFATE 7.5; 7.5; 7.5; 7.5 MG/1; MG/1; MG/1; MG/1
30 CAPSULE, EXTENDED RELEASE ORAL DAILY
Qty: 30 CAPSULE | Refills: 0 | Status: SHIPPED | OUTPATIENT
Start: 2021-04-22 | End: 2021-05-22 | Stop reason: SDUPTHER

## 2021-04-29 ENCOUNTER — PATIENT MESSAGE (OUTPATIENT)
Dept: RESEARCH | Facility: HOSPITAL | Age: 36
End: 2021-04-29

## 2021-06-25 ENCOUNTER — PATIENT MESSAGE (OUTPATIENT)
Dept: FAMILY MEDICINE | Facility: CLINIC | Age: 36
End: 2021-06-25

## 2021-06-25 DIAGNOSIS — J30.2 SEASONAL ALLERGIES: ICD-10-CM

## 2021-06-25 DIAGNOSIS — F90.9 ADULT ADHD: ICD-10-CM

## 2021-06-28 RX ORDER — DEXTROAMPHETAMINE SACCHARATE, AMPHETAMINE ASPARTATE MONOHYDRATE, DEXTROAMPHETAMINE SULFATE AND AMPHETAMINE SULFATE 6.25; 6.25; 6.25; 6.25 MG/1; MG/1; MG/1; MG/1
25 CAPSULE, EXTENDED RELEASE ORAL EVERY MORNING
Qty: 30 CAPSULE | Refills: 0 | Status: SHIPPED | OUTPATIENT
Start: 2021-06-28 | End: 2021-07-16 | Stop reason: SDUPTHER

## 2021-06-28 RX ORDER — DEXTROAMPHETAMINE SACCHARATE, AMPHETAMINE ASPARTATE MONOHYDRATE, DEXTROAMPHETAMINE SULFATE AND AMPHETAMINE SULFATE 7.5; 7.5; 7.5; 7.5 MG/1; MG/1; MG/1; MG/1
30 CAPSULE, EXTENDED RELEASE ORAL DAILY
Qty: 30 CAPSULE | Refills: 0 | Status: SHIPPED | OUTPATIENT
Start: 2021-06-28 | End: 2021-07-16 | Stop reason: SDUPTHER

## 2021-06-28 RX ORDER — CETIRIZINE HYDROCHLORIDE 10 MG/1
10 TABLET ORAL DAILY
Qty: 30 TABLET | Refills: 5 | Status: SHIPPED | OUTPATIENT
Start: 2021-06-28 | End: 2023-01-23 | Stop reason: SDUPTHER

## 2021-07-16 ENCOUNTER — OFFICE VISIT (OUTPATIENT)
Dept: FAMILY MEDICINE | Facility: CLINIC | Age: 36
End: 2021-07-16
Payer: MEDICAID

## 2021-07-16 VITALS
BODY MASS INDEX: 25.85 KG/M2 | WEIGHT: 128.25 LBS | SYSTOLIC BLOOD PRESSURE: 102 MMHG | HEIGHT: 59 IN | OXYGEN SATURATION: 99 % | DIASTOLIC BLOOD PRESSURE: 70 MMHG | RESPIRATION RATE: 18 BRPM | HEART RATE: 102 BPM

## 2021-07-16 DIAGNOSIS — M54.16 LUMBAR RADICULOPATHY: ICD-10-CM

## 2021-07-16 DIAGNOSIS — F90.9 ADULT ADHD: ICD-10-CM

## 2021-07-16 DIAGNOSIS — G89.29 CHRONIC MIDLINE LOW BACK PAIN WITHOUT SCIATICA: Primary | ICD-10-CM

## 2021-07-16 DIAGNOSIS — M54.41 ACUTE RIGHT-SIDED LOW BACK PAIN WITH RIGHT-SIDED SCIATICA: ICD-10-CM

## 2021-07-16 DIAGNOSIS — M54.50 CHRONIC MIDLINE LOW BACK PAIN WITHOUT SCIATICA: Primary | ICD-10-CM

## 2021-07-16 PROCEDURE — 99214 OFFICE O/P EST MOD 30 MIN: CPT | Mod: S$PBB,,, | Performed by: INTERNAL MEDICINE

## 2021-07-16 PROCEDURE — 99214 OFFICE O/P EST MOD 30 MIN: CPT | Performed by: INTERNAL MEDICINE

## 2021-07-16 PROCEDURE — 99214 PR OFFICE/OUTPT VISIT, EST, LEVL IV, 30-39 MIN: ICD-10-PCS | Mod: S$PBB,,, | Performed by: INTERNAL MEDICINE

## 2021-07-16 RX ORDER — DOXYCYCLINE 100 MG/1
100 CAPSULE ORAL 2 TIMES DAILY
COMMUNITY
Start: 2021-06-28 | End: 2022-10-25

## 2021-07-16 RX ORDER — DEXTROAMPHETAMINE SACCHARATE, AMPHETAMINE ASPARTATE MONOHYDRATE, DEXTROAMPHETAMINE SULFATE AND AMPHETAMINE SULFATE 7.5; 7.5; 7.5; 7.5 MG/1; MG/1; MG/1; MG/1
30 CAPSULE, EXTENDED RELEASE ORAL EVERY MORNING
Qty: 30 CAPSULE | Refills: 0 | Status: SHIPPED | OUTPATIENT
Start: 2021-08-26 | End: 2021-10-08 | Stop reason: SDUPTHER

## 2021-07-16 RX ORDER — DEXTROAMPHETAMINE SACCHARATE, AMPHETAMINE ASPARTATE MONOHYDRATE, DEXTROAMPHETAMINE SULFATE AND AMPHETAMINE SULFATE 6.25; 6.25; 6.25; 6.25 MG/1; MG/1; MG/1; MG/1
25 CAPSULE, EXTENDED RELEASE ORAL EVERY MORNING
Qty: 30 CAPSULE | Refills: 0 | Status: SHIPPED | OUTPATIENT
Start: 2021-07-27 | End: 2021-09-04 | Stop reason: SDUPTHER

## 2021-07-16 RX ORDER — DEXTROAMPHETAMINE SACCHARATE, AMPHETAMINE ASPARTATE MONOHYDRATE, DEXTROAMPHETAMINE SULFATE AND AMPHETAMINE SULFATE 7.5; 7.5; 7.5; 7.5 MG/1; MG/1; MG/1; MG/1
30 CAPSULE, EXTENDED RELEASE ORAL DAILY
Qty: 30 CAPSULE | Refills: 0 | Status: SHIPPED | OUTPATIENT
Start: 2021-07-27 | End: 2021-09-04 | Stop reason: SDUPTHER

## 2021-07-16 RX ORDER — TRIAMCINOLONE ACETONIDE 1 MG/G
CREAM TOPICAL
COMMUNITY
Start: 2021-06-28 | End: 2022-10-25

## 2021-07-16 RX ORDER — CLOBETASOL PROPIONATE 0.46 MG/ML
SOLUTION TOPICAL
COMMUNITY
Start: 2021-04-29

## 2021-07-16 RX ORDER — GABAPENTIN 300 MG/1
300 CAPSULE ORAL 3 TIMES DAILY
Qty: 90 CAPSULE | Refills: 3 | Status: SHIPPED | OUTPATIENT
Start: 2021-07-16 | End: 2021-11-29

## 2021-07-16 RX ORDER — DEXTROAMPHETAMINE SACCHARATE, AMPHETAMINE ASPARTATE MONOHYDRATE, DEXTROAMPHETAMINE SULFATE AND AMPHETAMINE SULFATE 6.25; 6.25; 6.25; 6.25 MG/1; MG/1; MG/1; MG/1
25 CAPSULE, EXTENDED RELEASE ORAL DAILY
Qty: 30 CAPSULE | Refills: 0 | Status: SHIPPED | OUTPATIENT
Start: 2021-08-26 | End: 2021-10-15 | Stop reason: SDUPTHER

## 2021-07-29 ENCOUNTER — PATIENT MESSAGE (OUTPATIENT)
Dept: FAMILY MEDICINE | Facility: CLINIC | Age: 36
End: 2021-07-29

## 2021-09-27 ENCOUNTER — PATIENT MESSAGE (OUTPATIENT)
Dept: FAMILY MEDICINE | Facility: CLINIC | Age: 36
End: 2021-09-27

## 2021-09-27 ENCOUNTER — TELEPHONE (OUTPATIENT)
Dept: PEDIATRICS | Facility: CLINIC | Age: 36
End: 2021-09-27

## 2021-09-27 DIAGNOSIS — S82.001D CLOSED DISPLACED FRACTURE OF RIGHT PATELLA WITH ROUTINE HEALING, UNSPECIFIED FRACTURE MORPHOLOGY, SUBSEQUENT ENCOUNTER: Primary | ICD-10-CM

## 2021-09-28 PROBLEM — S82.014A: Status: ACTIVE | Noted: 2021-09-28

## 2021-10-08 DIAGNOSIS — F90.9 ADULT ADHD: ICD-10-CM

## 2021-10-08 RX ORDER — DEXTROAMPHETAMINE SACCHARATE, AMPHETAMINE ASPARTATE MONOHYDRATE, DEXTROAMPHETAMINE SULFATE AND AMPHETAMINE SULFATE 7.5; 7.5; 7.5; 7.5 MG/1; MG/1; MG/1; MG/1
30 CAPSULE, EXTENDED RELEASE ORAL EVERY MORNING
Qty: 30 CAPSULE | Refills: 0 | Status: SHIPPED | OUTPATIENT
Start: 2021-10-08 | End: 2022-01-19 | Stop reason: SDUPTHER

## 2021-10-08 RX ORDER — DEXTROAMPHETAMINE SACCHARATE, AMPHETAMINE ASPARTATE MONOHYDRATE, DEXTROAMPHETAMINE SULFATE AND AMPHETAMINE SULFATE 6.25; 6.25; 6.25; 6.25 MG/1; MG/1; MG/1; MG/1
25 CAPSULE, EXTENDED RELEASE ORAL EVERY MORNING
Qty: 30 CAPSULE | Refills: 0 | Status: SHIPPED | OUTPATIENT
Start: 2021-10-08 | End: 2022-01-19

## 2021-10-08 RX ORDER — DEXTROAMPHETAMINE SACCHARATE, AMPHETAMINE ASPARTATE MONOHYDRATE, DEXTROAMPHETAMINE SULFATE AND AMPHETAMINE SULFATE 7.5; 7.5; 7.5; 7.5 MG/1; MG/1; MG/1; MG/1
30 CAPSULE, EXTENDED RELEASE ORAL DAILY
Qty: 30 CAPSULE | Refills: 0 | Status: CANCELLED | OUTPATIENT
Start: 2021-10-08

## 2021-10-15 ENCOUNTER — OFFICE VISIT (OUTPATIENT)
Dept: FAMILY MEDICINE | Facility: CLINIC | Age: 36
End: 2021-10-15
Payer: MEDICAID

## 2021-10-15 VITALS
HEART RATE: 108 BPM | DIASTOLIC BLOOD PRESSURE: 78 MMHG | HEIGHT: 59 IN | TEMPERATURE: 99 F | SYSTOLIC BLOOD PRESSURE: 116 MMHG | OXYGEN SATURATION: 98 % | WEIGHT: 136.31 LBS | BODY MASS INDEX: 27.48 KG/M2

## 2021-10-15 DIAGNOSIS — F90.9 ADULT ADHD: ICD-10-CM

## 2021-10-15 DIAGNOSIS — Z23 NEED FOR INFLUENZA VACCINATION: Primary | ICD-10-CM

## 2021-10-15 PROCEDURE — 99213 PR OFFICE/OUTPT VISIT, EST, LEVL III, 20-29 MIN: ICD-10-PCS | Mod: S$PBB,,, | Performed by: FAMILY MEDICINE

## 2021-10-15 PROCEDURE — 99214 OFFICE O/P EST MOD 30 MIN: CPT | Performed by: FAMILY MEDICINE

## 2021-10-15 PROCEDURE — 99213 OFFICE O/P EST LOW 20 MIN: CPT | Mod: S$PBB,,, | Performed by: FAMILY MEDICINE

## 2021-10-15 PROCEDURE — 90686 IIV4 VACC NO PRSV 0.5 ML IM: CPT | Mod: PBBFAC | Performed by: FAMILY MEDICINE

## 2021-10-15 RX ORDER — DEXTROAMPHETAMINE SACCHARATE, AMPHETAMINE ASPARTATE MONOHYDRATE, DEXTROAMPHETAMINE SULFATE AND AMPHETAMINE SULFATE 6.25; 6.25; 6.25; 6.25 MG/1; MG/1; MG/1; MG/1
25 CAPSULE, EXTENDED RELEASE ORAL DAILY
Qty: 30 CAPSULE | Refills: 0 | Status: SHIPPED | OUTPATIENT
Start: 2021-11-15 | End: 2022-01-19 | Stop reason: SDUPTHER

## 2021-10-15 RX ORDER — DEXTROAMPHETAMINE SACCHARATE, AMPHETAMINE ASPARTATE MONOHYDRATE, DEXTROAMPHETAMINE SULFATE AND AMPHETAMINE SULFATE 6.25; 6.25; 6.25; 6.25 MG/1; MG/1; MG/1; MG/1
25 CAPSULE, EXTENDED RELEASE ORAL DAILY
Qty: 30 CAPSULE | Refills: 0 | Status: SHIPPED | OUTPATIENT
Start: 2021-10-15 | End: 2022-01-19 | Stop reason: SDUPTHER

## 2021-10-15 RX ORDER — DEXTROAMPHETAMINE SACCHARATE, AMPHETAMINE ASPARTATE MONOHYDRATE, DEXTROAMPHETAMINE SULFATE AND AMPHETAMINE SULFATE 7.5; 7.5; 7.5; 7.5 MG/1; MG/1; MG/1; MG/1
30 CAPSULE, EXTENDED RELEASE ORAL DAILY
Qty: 30 CAPSULE | Refills: 0 | Status: SHIPPED | OUTPATIENT
Start: 2021-11-15 | End: 2022-01-19 | Stop reason: SDUPTHER

## 2021-10-15 RX ORDER — DEXTROAMPHETAMINE SACCHARATE, AMPHETAMINE ASPARTATE MONOHYDRATE, DEXTROAMPHETAMINE SULFATE AND AMPHETAMINE SULFATE 7.5; 7.5; 7.5; 7.5 MG/1; MG/1; MG/1; MG/1
30 CAPSULE, EXTENDED RELEASE ORAL DAILY
Qty: 30 CAPSULE | Refills: 0 | Status: SHIPPED | OUTPATIENT
Start: 2021-12-15 | End: 2022-01-19

## 2021-10-15 RX ORDER — DEXTROAMPHETAMINE SACCHARATE, AMPHETAMINE ASPARTATE MONOHYDRATE, DEXTROAMPHETAMINE SULFATE AND AMPHETAMINE SULFATE 6.25; 6.25; 6.25; 6.25 MG/1; MG/1; MG/1; MG/1
25 CAPSULE, EXTENDED RELEASE ORAL DAILY
Qty: 30 CAPSULE | Refills: 0 | Status: SHIPPED | OUTPATIENT
Start: 2021-12-15 | End: 2022-01-19 | Stop reason: SDUPTHER

## 2021-10-15 RX ORDER — DEXTROAMPHETAMINE SACCHARATE, AMPHETAMINE ASPARTATE MONOHYDRATE, DEXTROAMPHETAMINE SULFATE AND AMPHETAMINE SULFATE 7.5; 7.5; 7.5; 7.5 MG/1; MG/1; MG/1; MG/1
30 CAPSULE, EXTENDED RELEASE ORAL DAILY
Qty: 30 CAPSULE | Refills: 0 | Status: SHIPPED | OUTPATIENT
Start: 2021-10-15 | End: 2022-01-19 | Stop reason: SDUPTHER

## 2021-10-26 ENCOUNTER — CLINICAL SUPPORT (OUTPATIENT)
Dept: REHABILITATION | Facility: HOSPITAL | Age: 36
End: 2021-10-26
Payer: MEDICAID

## 2021-10-26 DIAGNOSIS — M25.561 CHRONIC PAIN OF RIGHT KNEE: Primary | ICD-10-CM

## 2021-10-26 DIAGNOSIS — M25.60 DECREASED RANGE OF MOTION: ICD-10-CM

## 2021-10-26 DIAGNOSIS — R26.2 DIFFICULTY WALKING: ICD-10-CM

## 2021-10-26 DIAGNOSIS — S83.004D PATELLAR DISLOCATION, RIGHT, SUBSEQUENT ENCOUNTER: ICD-10-CM

## 2021-10-26 DIAGNOSIS — M62.81 MUSCLE WEAKNESS: ICD-10-CM

## 2021-10-26 DIAGNOSIS — G89.29 CHRONIC PAIN OF RIGHT KNEE: Primary | ICD-10-CM

## 2021-10-26 PROCEDURE — 97161 PT EVAL LOW COMPLEX 20 MIN: CPT | Mod: PN | Performed by: PHYSICAL THERAPIST

## 2021-10-26 PROCEDURE — 97110 THERAPEUTIC EXERCISES: CPT | Mod: PN | Performed by: PHYSICAL THERAPIST

## 2021-10-29 ENCOUNTER — TELEPHONE (OUTPATIENT)
Dept: REHABILITATION | Facility: HOSPITAL | Age: 36
End: 2021-10-29
Payer: MEDICAID

## 2021-11-02 ENCOUNTER — CLINICAL SUPPORT (OUTPATIENT)
Dept: REHABILITATION | Facility: HOSPITAL | Age: 36
End: 2021-11-02
Payer: MEDICAID

## 2021-11-02 DIAGNOSIS — M62.81 MUSCLE WEAKNESS: ICD-10-CM

## 2021-11-02 DIAGNOSIS — G89.29 CHRONIC PAIN OF RIGHT KNEE: Primary | ICD-10-CM

## 2021-11-02 DIAGNOSIS — M25.60 DECREASED RANGE OF MOTION: ICD-10-CM

## 2021-11-02 DIAGNOSIS — R26.2 DIFFICULTY WALKING: ICD-10-CM

## 2021-11-02 DIAGNOSIS — M25.561 CHRONIC PAIN OF RIGHT KNEE: Primary | ICD-10-CM

## 2021-11-02 PROCEDURE — 97110 THERAPEUTIC EXERCISES: CPT | Mod: PN | Performed by: PHYSICAL THERAPIST

## 2021-11-04 ENCOUNTER — CLINICAL SUPPORT (OUTPATIENT)
Dept: REHABILITATION | Facility: HOSPITAL | Age: 36
End: 2021-11-04
Payer: MEDICAID

## 2021-11-04 DIAGNOSIS — G89.29 CHRONIC PAIN OF RIGHT KNEE: Primary | ICD-10-CM

## 2021-11-04 DIAGNOSIS — M25.561 CHRONIC PAIN OF RIGHT KNEE: Primary | ICD-10-CM

## 2021-11-04 DIAGNOSIS — M62.81 MUSCLE WEAKNESS: ICD-10-CM

## 2021-11-04 DIAGNOSIS — M25.60 DECREASED RANGE OF MOTION: ICD-10-CM

## 2021-11-04 DIAGNOSIS — R26.2 DIFFICULTY WALKING: ICD-10-CM

## 2021-11-04 PROCEDURE — 97110 THERAPEUTIC EXERCISES: CPT | Mod: PN | Performed by: PHYSICAL THERAPIST

## 2021-11-09 ENCOUNTER — CLINICAL SUPPORT (OUTPATIENT)
Dept: REHABILITATION | Facility: HOSPITAL | Age: 36
End: 2021-11-09
Payer: MEDICAID

## 2021-11-09 DIAGNOSIS — M25.60 DECREASED RANGE OF MOTION: ICD-10-CM

## 2021-11-09 DIAGNOSIS — M62.81 MUSCLE WEAKNESS: ICD-10-CM

## 2021-11-09 DIAGNOSIS — M25.561 CHRONIC PAIN OF RIGHT KNEE: ICD-10-CM

## 2021-11-09 DIAGNOSIS — G89.29 CHRONIC PAIN OF RIGHT KNEE: ICD-10-CM

## 2021-11-09 DIAGNOSIS — R26.2 DIFFICULTY WALKING: ICD-10-CM

## 2021-11-09 PROCEDURE — 97110 THERAPEUTIC EXERCISES: CPT | Mod: PN

## 2021-11-11 ENCOUNTER — CLINICAL SUPPORT (OUTPATIENT)
Dept: REHABILITATION | Facility: HOSPITAL | Age: 36
End: 2021-11-11
Payer: MEDICAID

## 2021-11-11 DIAGNOSIS — G89.29 CHRONIC PAIN OF RIGHT KNEE: ICD-10-CM

## 2021-11-11 DIAGNOSIS — M62.81 MUSCLE WEAKNESS: ICD-10-CM

## 2021-11-11 DIAGNOSIS — M25.60 DECREASED RANGE OF MOTION: ICD-10-CM

## 2021-11-11 DIAGNOSIS — R26.2 DIFFICULTY WALKING: ICD-10-CM

## 2021-11-11 DIAGNOSIS — M25.561 CHRONIC PAIN OF RIGHT KNEE: ICD-10-CM

## 2021-11-11 PROCEDURE — 97110 THERAPEUTIC EXERCISES: CPT | Mod: PN

## 2021-11-15 ENCOUNTER — DOCUMENTATION ONLY (OUTPATIENT)
Dept: REHABILITATION | Facility: HOSPITAL | Age: 36
End: 2021-11-15
Payer: MEDICAID

## 2021-11-16 ENCOUNTER — CLINICAL SUPPORT (OUTPATIENT)
Dept: REHABILITATION | Facility: HOSPITAL | Age: 36
End: 2021-11-16
Payer: MEDICAID

## 2021-11-16 DIAGNOSIS — M62.81 MUSCLE WEAKNESS: ICD-10-CM

## 2021-11-16 DIAGNOSIS — M25.60 DECREASED RANGE OF MOTION: ICD-10-CM

## 2021-11-16 DIAGNOSIS — R26.2 DIFFICULTY WALKING: ICD-10-CM

## 2021-11-16 DIAGNOSIS — M25.561 CHRONIC PAIN OF RIGHT KNEE: ICD-10-CM

## 2021-11-16 DIAGNOSIS — G89.29 CHRONIC PAIN OF RIGHT KNEE: ICD-10-CM

## 2021-11-16 PROCEDURE — 97110 THERAPEUTIC EXERCISES: CPT | Mod: PN,CQ

## 2021-11-18 ENCOUNTER — CLINICAL SUPPORT (OUTPATIENT)
Dept: REHABILITATION | Facility: HOSPITAL | Age: 36
End: 2021-11-18
Payer: MEDICAID

## 2021-11-18 DIAGNOSIS — R26.2 DIFFICULTY WALKING: ICD-10-CM

## 2021-11-18 DIAGNOSIS — G89.29 CHRONIC PAIN OF RIGHT KNEE: ICD-10-CM

## 2021-11-18 DIAGNOSIS — M25.561 CHRONIC PAIN OF RIGHT KNEE: ICD-10-CM

## 2021-11-18 DIAGNOSIS — M25.60 DECREASED RANGE OF MOTION: ICD-10-CM

## 2021-11-18 DIAGNOSIS — M62.81 MUSCLE WEAKNESS: ICD-10-CM

## 2021-11-18 PROCEDURE — 97110 THERAPEUTIC EXERCISES: CPT | Mod: PN,CQ

## 2021-11-23 ENCOUNTER — CLINICAL SUPPORT (OUTPATIENT)
Dept: REHABILITATION | Facility: HOSPITAL | Age: 36
End: 2021-11-23
Payer: MEDICAID

## 2021-11-23 DIAGNOSIS — R26.2 DIFFICULTY WALKING: ICD-10-CM

## 2021-11-23 DIAGNOSIS — M62.81 MUSCLE WEAKNESS: ICD-10-CM

## 2021-11-23 DIAGNOSIS — M25.561 CHRONIC PAIN OF RIGHT KNEE: Primary | ICD-10-CM

## 2021-11-23 DIAGNOSIS — G89.29 CHRONIC PAIN OF RIGHT KNEE: Primary | ICD-10-CM

## 2021-11-23 DIAGNOSIS — M25.60 DECREASED RANGE OF MOTION: ICD-10-CM

## 2021-11-23 PROCEDURE — 97110 THERAPEUTIC EXERCISES: CPT | Mod: PN | Performed by: PHYSICAL THERAPIST

## 2021-11-26 ENCOUNTER — CLINICAL SUPPORT (OUTPATIENT)
Dept: REHABILITATION | Facility: HOSPITAL | Age: 36
End: 2021-11-26
Payer: MEDICAID

## 2021-11-26 DIAGNOSIS — M62.81 MUSCLE WEAKNESS: ICD-10-CM

## 2021-11-26 DIAGNOSIS — M25.561 CHRONIC PAIN OF RIGHT KNEE: Primary | ICD-10-CM

## 2021-11-26 DIAGNOSIS — R26.2 DIFFICULTY WALKING: ICD-10-CM

## 2021-11-26 DIAGNOSIS — G89.29 CHRONIC PAIN OF RIGHT KNEE: Primary | ICD-10-CM

## 2021-11-26 DIAGNOSIS — M25.60 DECREASED RANGE OF MOTION: ICD-10-CM

## 2021-11-26 PROCEDURE — 97110 THERAPEUTIC EXERCISES: CPT | Mod: PN | Performed by: PHYSICAL THERAPIST

## 2021-11-29 ENCOUNTER — CLINICAL SUPPORT (OUTPATIENT)
Dept: REHABILITATION | Facility: HOSPITAL | Age: 36
End: 2021-11-29
Payer: MEDICAID

## 2021-11-29 DIAGNOSIS — G89.29 CHRONIC PAIN OF RIGHT KNEE: Primary | ICD-10-CM

## 2021-11-29 DIAGNOSIS — R26.2 DIFFICULTY WALKING: ICD-10-CM

## 2021-11-29 DIAGNOSIS — M25.561 CHRONIC PAIN OF RIGHT KNEE: Primary | ICD-10-CM

## 2021-11-29 DIAGNOSIS — M25.60 DECREASED RANGE OF MOTION: ICD-10-CM

## 2021-11-29 DIAGNOSIS — M62.81 MUSCLE WEAKNESS: ICD-10-CM

## 2021-11-29 PROCEDURE — 97110 THERAPEUTIC EXERCISES: CPT | Mod: PN | Performed by: PHYSICAL THERAPIST

## 2021-11-30 ENCOUNTER — CLINICAL SUPPORT (OUTPATIENT)
Dept: REHABILITATION | Facility: HOSPITAL | Age: 36
End: 2021-11-30
Payer: MEDICAID

## 2021-11-30 DIAGNOSIS — M25.561 CHRONIC PAIN OF RIGHT KNEE: Primary | ICD-10-CM

## 2021-11-30 DIAGNOSIS — M62.81 MUSCLE WEAKNESS: ICD-10-CM

## 2021-11-30 DIAGNOSIS — R26.2 DIFFICULTY WALKING: ICD-10-CM

## 2021-11-30 DIAGNOSIS — G89.29 CHRONIC PAIN OF RIGHT KNEE: Primary | ICD-10-CM

## 2021-11-30 DIAGNOSIS — M25.60 DECREASED RANGE OF MOTION: ICD-10-CM

## 2021-11-30 PROCEDURE — 97110 THERAPEUTIC EXERCISES: CPT | Mod: PN,CQ

## 2021-12-02 ENCOUNTER — CLINICAL SUPPORT (OUTPATIENT)
Dept: REHABILITATION | Facility: HOSPITAL | Age: 36
End: 2021-12-02
Payer: MEDICAID

## 2021-12-02 DIAGNOSIS — M25.561 CHRONIC PAIN OF RIGHT KNEE: ICD-10-CM

## 2021-12-02 DIAGNOSIS — M25.60 DECREASED RANGE OF MOTION: ICD-10-CM

## 2021-12-02 DIAGNOSIS — M62.81 MUSCLE WEAKNESS: ICD-10-CM

## 2021-12-02 DIAGNOSIS — G89.29 CHRONIC PAIN OF RIGHT KNEE: ICD-10-CM

## 2021-12-02 DIAGNOSIS — R26.2 DIFFICULTY WALKING: ICD-10-CM

## 2021-12-02 PROCEDURE — 97110 THERAPEUTIC EXERCISES: CPT | Mod: PN,CQ

## 2021-12-07 ENCOUNTER — CLINICAL SUPPORT (OUTPATIENT)
Dept: REHABILITATION | Facility: HOSPITAL | Age: 36
End: 2021-12-07
Payer: MEDICAID

## 2021-12-07 DIAGNOSIS — M25.60 DECREASED RANGE OF MOTION: ICD-10-CM

## 2021-12-07 DIAGNOSIS — M62.81 MUSCLE WEAKNESS: ICD-10-CM

## 2021-12-07 DIAGNOSIS — G89.29 CHRONIC PAIN OF RIGHT KNEE: ICD-10-CM

## 2021-12-07 DIAGNOSIS — R26.2 DIFFICULTY WALKING: ICD-10-CM

## 2021-12-07 DIAGNOSIS — M25.561 CHRONIC PAIN OF RIGHT KNEE: ICD-10-CM

## 2021-12-07 PROCEDURE — 97110 THERAPEUTIC EXERCISES: CPT | Mod: PN,CQ

## 2021-12-09 ENCOUNTER — CLINICAL SUPPORT (OUTPATIENT)
Dept: REHABILITATION | Facility: HOSPITAL | Age: 36
End: 2021-12-09
Payer: MEDICAID

## 2021-12-09 DIAGNOSIS — G89.29 CHRONIC PAIN OF RIGHT KNEE: Primary | ICD-10-CM

## 2021-12-09 DIAGNOSIS — M25.60 DECREASED RANGE OF MOTION: ICD-10-CM

## 2021-12-09 DIAGNOSIS — R26.2 DIFFICULTY WALKING: ICD-10-CM

## 2021-12-09 DIAGNOSIS — M25.561 CHRONIC PAIN OF RIGHT KNEE: Primary | ICD-10-CM

## 2021-12-09 DIAGNOSIS — M62.81 MUSCLE WEAKNESS: ICD-10-CM

## 2021-12-09 PROCEDURE — 97110 THERAPEUTIC EXERCISES: CPT | Mod: PN | Performed by: PHYSICAL THERAPIST

## 2021-12-10 ENCOUNTER — CLINICAL SUPPORT (OUTPATIENT)
Dept: REHABILITATION | Facility: HOSPITAL | Age: 36
End: 2021-12-10
Payer: MEDICAID

## 2021-12-10 DIAGNOSIS — M25.60 DECREASED RANGE OF MOTION: ICD-10-CM

## 2021-12-10 DIAGNOSIS — G89.29 CHRONIC PAIN OF RIGHT KNEE: Primary | ICD-10-CM

## 2021-12-10 DIAGNOSIS — M62.81 MUSCLE WEAKNESS: ICD-10-CM

## 2021-12-10 DIAGNOSIS — R26.2 DIFFICULTY WALKING: ICD-10-CM

## 2021-12-10 DIAGNOSIS — M25.561 CHRONIC PAIN OF RIGHT KNEE: Primary | ICD-10-CM

## 2021-12-10 PROCEDURE — 97110 THERAPEUTIC EXERCISES: CPT | Mod: PN | Performed by: PHYSICAL THERAPIST

## 2021-12-13 PROBLEM — S83.004D: Status: ACTIVE | Noted: 2021-12-13

## 2021-12-14 ENCOUNTER — CLINICAL SUPPORT (OUTPATIENT)
Dept: REHABILITATION | Facility: HOSPITAL | Age: 36
End: 2021-12-14
Payer: MEDICAID

## 2021-12-14 DIAGNOSIS — R26.2 DIFFICULTY WALKING: ICD-10-CM

## 2021-12-14 DIAGNOSIS — G89.29 CHRONIC PAIN OF RIGHT KNEE: Primary | ICD-10-CM

## 2021-12-14 DIAGNOSIS — M25.561 CHRONIC PAIN OF RIGHT KNEE: Primary | ICD-10-CM

## 2021-12-14 DIAGNOSIS — M62.81 MUSCLE WEAKNESS: ICD-10-CM

## 2021-12-14 DIAGNOSIS — M25.60 DECREASED RANGE OF MOTION: ICD-10-CM

## 2021-12-14 PROCEDURE — 97110 THERAPEUTIC EXERCISES: CPT | Mod: PN

## 2021-12-16 ENCOUNTER — CLINICAL SUPPORT (OUTPATIENT)
Dept: REHABILITATION | Facility: HOSPITAL | Age: 36
End: 2021-12-16
Payer: MEDICAID

## 2021-12-16 DIAGNOSIS — M62.81 MUSCLE WEAKNESS: ICD-10-CM

## 2021-12-16 DIAGNOSIS — M25.60 DECREASED RANGE OF MOTION: ICD-10-CM

## 2021-12-16 DIAGNOSIS — M25.561 CHRONIC PAIN OF RIGHT KNEE: Primary | ICD-10-CM

## 2021-12-16 DIAGNOSIS — G89.29 CHRONIC PAIN OF RIGHT KNEE: Primary | ICD-10-CM

## 2021-12-16 DIAGNOSIS — R26.2 DIFFICULTY WALKING: ICD-10-CM

## 2021-12-16 PROCEDURE — 97110 THERAPEUTIC EXERCISES: CPT | Mod: PN | Performed by: PHYSICAL THERAPIST

## 2021-12-21 ENCOUNTER — CLINICAL SUPPORT (OUTPATIENT)
Dept: REHABILITATION | Facility: HOSPITAL | Age: 36
End: 2021-12-21
Payer: MEDICAID

## 2021-12-21 DIAGNOSIS — M25.60 DECREASED RANGE OF MOTION: ICD-10-CM

## 2021-12-21 DIAGNOSIS — R26.2 DIFFICULTY WALKING: ICD-10-CM

## 2021-12-21 DIAGNOSIS — M25.561 CHRONIC PAIN OF RIGHT KNEE: ICD-10-CM

## 2021-12-21 DIAGNOSIS — M62.81 MUSCLE WEAKNESS: ICD-10-CM

## 2021-12-21 DIAGNOSIS — G89.29 CHRONIC PAIN OF RIGHT KNEE: ICD-10-CM

## 2021-12-21 PROCEDURE — 97110 THERAPEUTIC EXERCISES: CPT | Mod: PN,CQ

## 2021-12-28 ENCOUNTER — CLINICAL SUPPORT (OUTPATIENT)
Dept: REHABILITATION | Facility: HOSPITAL | Age: 36
End: 2021-12-28
Payer: MEDICAID

## 2021-12-28 DIAGNOSIS — R26.2 DIFFICULTY WALKING: ICD-10-CM

## 2021-12-28 DIAGNOSIS — M62.81 MUSCLE WEAKNESS: ICD-10-CM

## 2021-12-28 DIAGNOSIS — M25.60 DECREASED RANGE OF MOTION: ICD-10-CM

## 2021-12-28 DIAGNOSIS — G89.29 CHRONIC PAIN OF RIGHT KNEE: ICD-10-CM

## 2021-12-28 DIAGNOSIS — M25.561 CHRONIC PAIN OF RIGHT KNEE: ICD-10-CM

## 2021-12-28 PROCEDURE — 97110 THERAPEUTIC EXERCISES: CPT | Mod: PN

## 2021-12-30 ENCOUNTER — CLINICAL SUPPORT (OUTPATIENT)
Dept: REHABILITATION | Facility: HOSPITAL | Age: 36
End: 2021-12-30
Payer: MEDICAID

## 2021-12-30 DIAGNOSIS — R26.2 DIFFICULTY WALKING: ICD-10-CM

## 2021-12-30 DIAGNOSIS — M62.81 MUSCLE WEAKNESS: ICD-10-CM

## 2021-12-30 DIAGNOSIS — M25.561 CHRONIC PAIN OF RIGHT KNEE: Primary | ICD-10-CM

## 2021-12-30 DIAGNOSIS — M25.60 DECREASED RANGE OF MOTION: ICD-10-CM

## 2021-12-30 DIAGNOSIS — G89.29 CHRONIC PAIN OF RIGHT KNEE: Primary | ICD-10-CM

## 2021-12-30 PROCEDURE — 97110 THERAPEUTIC EXERCISES: CPT | Mod: PN | Performed by: PHYSICAL THERAPIST

## 2022-01-03 NOTE — PROGRESS NOTES
Physical Therapy Daily Treatment Note     Name: Iris Blanchard  Clinic Number: [id number]    Therapy Diagnosis:   Encounter Diagnoses   Name Primary?    Chronic pain of right knee Yes    Decreased range of motion     Muscle weakness     Difficulty walking      Physician: Dean Yu II, *    Visit Date: 1/4/2022    Physician Orders: see MD protocol in treatment section  Medical Diagnosis from Referral: S83.004D (ICD-10-CM) - Patellar dislocation, right, subsequent encounter  DOI 9-24-21  Evaluation Date: 10/26/2021  Authorization Period Expiration: [id number]  Plan of Care Expiration: 1-18-21  Visit # / Visits authorized: 20/20 (+1 from oskar)   MD Follow up appointment: 2-7-22     Precautions: Standard and restrictions as per protocol  Stay in patella stabilizing brace    Time In: ***  Time Out: ***  Total Billable Time: 25 direct  minutes      Subjective     Pt reports: currently knee is feeling good. Pt states since last visit she is getting a sharp pain at times in medial knee with soreness.  Pt states no longer getting popping in knee she had previously    Response to previous treatment: felt sore  Functional change: walking with brace outside, walking with no brace in house.      Pain: 0/10 currently at worst 0/10 pain , if get swelling more stiffness but not pain at best 0/10     Location: right knee     Objective       Knee  ROM: (measured in degrees) 12-30-21 after bike but prior to other Rx  Knee (R)   Flexion 145   Extension 5 passive  2 active       Knee  ROM: (measured in degrees) 12-10-21  Knee (R)   Flexion 135   Extension 5 passive   0 active         TREATMENT    S/p Injury 12 weeks on 12-17-21    MD protocol    Week 7:  -Continue above exercises  -Self ROM 4-5x/day using other leg to provide ROM  -8 inch step ups  -4 inch step downs  -Single leg proprioceptive training  -Lateral step out with therabands  -Retro treadmill progressive inclines  -Sport cord (bungee) walking  -Increase  "resistance on stationary bike     Week 8:  -Continue above exercises  -Stairmaster machine  -Brisk walking  -Progress balance and board throws     Week 9:  -Bike outdoors, level surfaces only  -Start slide board  -Plyometric leg press  -8 inch step downs     Week 10:  -Should have normal ROM (equal to opposite knee)  -Begin resistance for open chain knee extension  -Jump down's (double stance landing)  -Progress to running program and light sport-specific drills if:              Quad strength >75% contralateral side              AROM 0 to >125 degrees              Functional hop test >70% contralateral side              Swelling <1cm at joint line              No pain              Demonstrates good control on jump down      Iris received therapeutic exercises to develop strength, endurance, ROM, flexibility and core stabilization for 25 direct minutes including:    PT assisted by PT tech    Stat bike x 5 min at L2 without brace   SAQ on 1/2 roll then have strap around ankle.  Pt will actively do SAQ then pull up for TKE and try to hold at that full extended position for 3 sec and then lower back to resting position x 10  Quad set x 10 with emphasis on lifting ankle off mat.          Wall sits x 10 sec x 3 reps     Stairs x 5 laps holding lightly on hand rail to normalize gait to adjust for slight weakness with ascent and descent     Heel raises x 20 shift to R   Minisquat with ball squeeze x 20  TKE on R with blueTB in standing x 20      Squat touch to 18" box  x 20 emphasis on mechanics     (NP)Lateral step down x 6" x 10, focus on quad control  (NP)Foot clocks with focus on isometric quad on right 2x5  (NP)Single leg squat R with cone  / place down x 10  Retro treadmill, incline 3.0, speed 2.0 mph x 5 min     Shuttle B LE 2 bands x 20   Shuttle 2:1 press 2 bands x20  Shuttle R LE 1 band x 2/10  Pilates press stand on 2" step 2 springs level 4 and level 3 x 2/10    Possible next visit:  forward step up " with opposite hip flexion,  yamilka walk        Cold pack with compression x 10 minutes  on mat at end of session  Instructed pt to continue icing knee as needed and at least daily with elevation when increased swelling noted    Home Exercises Provided and Patient Education Provided     Education provided:   - HEP  - stretch to point of tightness not pain  - exercise in pain free zone  - continue to ambulate with locked brace as quad control is poor  - increase frequency of ROM exercises   - increase frequency of quad strengthening      Written Home Exercises Provided: continue with prior HEP   Exercises were reviewed and Iris was able to demonstrate them prior to the end of the session.  Iris demonstrated good  understanding of the education provided.     See EMR under Patient Instructions for exercises provided at initial eval and 11-2-21, 11-23-21, 11-29-21, 12-16-21    Assessment   Iris continues with TKE weakness and eccentric control.  Pt with some soreness with additional ex, so held those today to allow further recovery and pt reported no increase in soreness at end of session.  Pt also did not report any sharp pain, but did feel a pop on last ex which was box squat, but no pain.  Overall, pt reported less popping in knee and may indicate increasing stability.  Pt with good full knee ROM from start and did not need to perform any ROM in clinic today so could focus on strength and stability      Iris is progressing towards her goals.   Pt prognosis is Good.     Pt will continue to benefit from skilled outpatient physical therapy to address the deficits listed in the problem list box on initial evaluation, provide pt/family education and to maximize pt's level of independence in the home and community environment.     Pt's spiritual, cultural and educational needs considered and pt agreeable to plan of care and goals.     Anticipated barriers to physical therapy: none    GOALS:   Short Term Goals:  6  weeks MET STG's  Increase range of motion to equal of opposite extremity  Increase strength 3 to 3+/5  D/C hinge brace to patella stabilization brace for gait.       Long Term Goals: 12 weeks (Progressing, not met)  Maintain full knee ROM  Improve muscle strength with MMT to 4+/5 to 5/5  Functional hop test >85% contralateral side   Restore ability to ambulate with normal pain free gait without brace  Walking for ADL and exercise will be restored without increased pain  Restore ability to stand for ADL without increased pain  Restore ability to perform sit to stand transfer without difficulty  Restore ability to climb stairs in a reciprocal manner with min to 0 increased pain and/or difficulty  Restore normal sleep habits without disturbances due to pain  Restore ability to perform ADL's and household activities independently and without increased pain       Plan     Continue with established plan of care towards PT goals.        Naty Piña, PT

## 2022-01-04 ENCOUNTER — CLINICAL SUPPORT (OUTPATIENT)
Dept: REHABILITATION | Facility: HOSPITAL | Age: 37
End: 2022-01-04
Payer: MEDICAID

## 2022-01-04 DIAGNOSIS — M25.60 DECREASED RANGE OF MOTION: ICD-10-CM

## 2022-01-04 DIAGNOSIS — R26.2 DIFFICULTY WALKING: ICD-10-CM

## 2022-01-04 DIAGNOSIS — M25.561 CHRONIC PAIN OF RIGHT KNEE: Primary | ICD-10-CM

## 2022-01-04 DIAGNOSIS — M62.81 MUSCLE WEAKNESS: ICD-10-CM

## 2022-01-04 DIAGNOSIS — G89.29 CHRONIC PAIN OF RIGHT KNEE: Primary | ICD-10-CM

## 2022-01-04 PROCEDURE — 97110 THERAPEUTIC EXERCISES: CPT | Mod: PN,CQ

## 2022-01-04 NOTE — PROGRESS NOTES
Physical Therapy Daily Treatment Note     Name: Iris Blanchard  Clinic Number: [id number]    Therapy Diagnosis:   Encounter Diagnoses   Name Primary?    Chronic pain of right knee Yes    Decreased range of motion     Muscle weakness     Difficulty walking      Physician: Dean Yu II, *    Visit Date: 1/4/2022    Physician Orders: see MD protocol in treatment section  Medical Diagnosis from Referral: S83.004D (ICD-10-CM) - Patellar dislocation, right, subsequent encounter  DOI 9-24-21  Evaluation Date: 10/26/2021  Authorization Period Expiration: 12-31-22  Plan of Care Expiration: 1-18-21  Visit # / Visits authorized: 1/20 (+20 prior)   MD Follow up appointment: 2-7-22     Precautions: Standard and restrictions as per protocol  Stay in patella stabilizing brace    Time In: 11:15  Time Out: 12:05  Total Billable Time: 50 minutes  Reassess at next session    Subjective     Pt reports: knee doing well today but feels tired and fatigued from being up on her feet a lot yesterday. States she is able to knee some, but feels comfortable with it only if wearing brace    Response to previous treatment: felt sore but no onset of pain  Functional change: walking with brace outside, walking with no brace in house.      Pain: 0/10 currently at worst 0/10 pain , if get swelling more stiffness but not pain at best 0/10     Location: right knee     Objective       Knee  ROM: (measured in degrees) 1-4-22 after bike but prior to other Rx  Knee (R)   Flexion 145   Extension 5 passive  2 active       Knee  ROM: (measured in degrees) 12-10-21  Knee (R)   Flexion 135   Extension 5 passive   0 active         TREATMENT    S/p Injury 12 weeks on 12-17-21    MD protocol    Week 7:  -Continue above exercises  -Self ROM 4-5x/day using other leg to provide ROM  -8 inch step ups  -4 inch step downs  -Single leg proprioceptive training  -Lateral step out with therabands  -Retro treadmill progressive inclines  -Sport cord (allison)  "walking  -Increase resistance on stationary bike     Week 8:  -Continue above exercises  -Stairmaster machine  -Brisk walking  -Progress balance and board throws     Week 9:  -Bike outdoors, level surfaces only  -Start slide board  -Plyometric leg press  -8 inch step downs     Week 10:  -Should have normal ROM (equal to opposite knee)  -Begin resistance for open chain knee extension  -Jump down's (double stance landing)  -Progress to running program and light sport-specific drills if:              Quad strength >75% contralateral side              AROM 0 to >125 degrees              Functional hop test >70% contralateral side              Swelling <1cm at joint line              No pain              Demonstrates good control on jump down      Iris received therapeutic exercises to develop strength, endurance, ROM, flexibility and core stabilization for 50 direct minutes including:      Stat bike x 5 min at L2 without brace   SAQ on 1/2 roll then have strap around ankle.  Pt will actively do SAQ then pull up for TKE and try to hold at that full extended position for 3 sec and then lower back to resting position x 10  Quad set x 10 with emphasis on lifting ankle off mat.       Wall sits x 10 sec x 3 reps    Stairs x 5 laps holding lightly on hand rail to normalize gait to adjust for slight weakness with ascent and descent     Heel raises x 20 shift to R   Minisquat with ball squeeze x 20  TKE on R with blueTB in standing x 20      Squat touch to 18" box  x 20 holding 5# kettle bell emphasis on mechanics    (NP)Lateral step down x 6" x 10, focus on quad control  (NP)Foot clocks with focus on isometric quad on right 2x5  (NP)Single leg squat R with cone  / place down x 10  Retro treadmill, incline 3.0, speed 2.0 mph x 3 min (stopped due to fatigue)    (NP) Shuttle B LE 2 bands x 20  Shuttle 2:1 press 2 bands x20  Shuttle R LE 1 band x 2/10  (NP) Pilates press stand on 2" step 2 springs level 4 and level 3 x " 2/10    Possible next visit:  forward step up with opposite hip flexion,  mule walk      (pt deferred to home)  Cold pack with compression x 00 minutes  on mat at end of session  Instructed pt to continue icing knee as needed and at least daily with elevation when increased swelling noted    Home Exercises Provided and Patient Education Provided     Education provided:   - HEP  - stretch to point of tightness not pain  - exercise in pain free zone  - continue to ambulate with locked brace as quad control is poor  - increase frequency of ROM exercises   - increase frequency of quad strengthening      Written Home Exercises Provided: continue with prior HEP   Exercises were reviewed and Iris was able to demonstrate them prior to the end of the session.  Iris demonstrated good  understanding of the education provided.     See EMR under Patient Instructions for exercises provided at initial eval and 11-2-21, 11-23-21, 11-29-21, 12-16-21    Assessment   Iris tolerated treatment well however she continues with quad weakness, especially with eccentric control. Increased fatigue noted today with treadmill, shuttle, and stairs, which could be related to increased activities yesterday. She is maintaining full knee ROM at beginning of session and throughout treatment. She will benefit from continued progression as able for strength and stability of the R knee and leg.    Iris is progressing towards her goals.   Pt prognosis is Good.     Pt will continue to benefit from skilled outpatient physical therapy to address the deficits listed in the problem list box on initial evaluation, provide pt/family education and to maximize pt's level of independence in the home and community environment.     Pt's spiritual, cultural and educational needs considered and pt agreeable to plan of care and goals.     Anticipated barriers to physical therapy: none    GOALS:   Short Term Goals:  6 weeks MET STG's  Increase range of  motion to equal of opposite extremity  Increase strength 3 to 3+/5  D/C hinge brace to patella stabilization brace for gait.       Long Term Goals: 12 weeks (Progressing, not met)  Maintain full knee ROM  Improve muscle strength with MMT to 4+/5 to 5/5  Functional hop test >85% contralateral side   Restore ability to ambulate with normal pain free gait without brace  Walking for ADL and exercise will be restored without increased pain  Restore ability to stand for ADL without increased pain  Restore ability to perform sit to stand transfer without difficulty  Restore ability to climb stairs in a reciprocal manner with min to 0 increased pain and/or difficulty  Restore normal sleep habits without disturbances due to pain  Restore ability to perform ADL's and household activities independently and without increased pain       Plan     Continue with established plan of care towards PT goals.        Nannette Lebron, PTA

## 2022-01-07 NOTE — PROGRESS NOTES
Physical Therapy Reassessment/Plan of Care  and Daily Treatment Note     Name: Iris Blanchard  Clinic Number: [id number]    Therapy Diagnosis:   Encounter Diagnoses   Name Primary?    Chronic pain of right knee Yes    Decreased range of motion     Muscle weakness     Difficulty walking      Physician: Dean Yu II, *    Visit Date: 1/10/2022    Physician Orders: see MD protocol in treatment section  Medical Diagnosis from Referral: S83.004D (ICD-10-CM) - Patellar dislocation, right, subsequent encounter  DOI 9-24-21  Evaluation Date: 10/26/2021  Authorization Period Expiration: 12-31-22  Plan of Care Expiration: 2-7-22  Visit # / Visits authorized: 2/20 (+20 prior)   MD Follow up appointment: 2-7-22     Precautions: Standard and restrictions as per protocol  Stay in patella stabilizing brace    Time In: 10:09 pt late  Time Out: 11:06  Total Billable Time: 57 minutes    Subjective     Pt reports: knee has felt stiff with cold weather.  Pt wearing brace.  Pt states doing well with no problems other than stiffness Pt states at times she is noticing that she is walking at faster pace and will feeling like normal walking at times.      Response to previous treatment: felt sore for a few hours and then was fine.    Functional change: walking with brace outside, walking with no brace in house.      Pain: 0/10 currently at worst 0/10 pain ,stiffness at times, but no pain     Location: right knee     Objective       Knee  ROM: (measured in degrees) 1-10-22 after bike but prior to other Rx  Knee (R)   Flexion 145   Extension 5 passive  0 active     After working on SAQ able to achieve 2 degrees extension       Knee  ROM: (measured in degrees) 12-10-21  Knee (R)   Flexion 135   Extension 5 passive   0 active     Flexibility testing:  - hamstrings:     90/90 test 15 B at IE  L 25  Passive SLR 30 R           - gastrocnemius:   DF ankle 10 B at IE  R 5 degrees L 10 degrees     Muscle Strength 1-10-22  MMT R   Hip  flexion 4+/5   Hip abduction 5/5   Hip extension 5/5   Glut max 4+/5   Hip adduction 5/5   Knee extension no lag with LAQ 4/5 at least   Knee flexion 5-/5           Muscle Strength 12-9-21  MMT R   Hip flexion 4/5   Hip abduction 4+/5   Hip extension 5/5   Glut max 4+/5   Hip adduction 4+/5   Knee extension  3/5 at least   Knee flexion 4+/5   Ankle dorsiflexion 5/5   Ankle plantar flexion 5/5   Ankle inversion 5/5   Ankle eversion 5/5         Muscle Strength deferred R LE L 5/5 overall at IE      Endurance is N/T.     Palpation: no TTP min atrophy noted at IE  slight TTP medial joint line of knee  Mod quad atrophy noted        TREATMENT    S/p Injury 15 weeks on 1-7-22    MD protocol    Week 7:  -Continue above exercises  -Self ROM 4-5x/day using other leg to provide ROM  -8 inch step ups  -4 inch step downs  -Single leg proprioceptive training  -Lateral step out with therabands  -Retro treadmill progressive inclines  -Sport cord ([employer] [place]) walking  -Increase resistance on stationary bike     Week 8:  -Continue above exercises  -Mobileye machine  -Brisk walking  -Progress balance and board throws     Week 9:  -Bike outdoors, level surfaces only  -Start slide board  -Plyometric leg press  -8 inch step downs     Week 10:  -Should have normal ROM (equal to opposite knee)  -Begin resistance for open chain knee extension  -Jump down's (double stance landing)  -Progress to running program and light sport-specific drills if:              Quad strength >75% contralateral side              AROM 0 to >125 degrees              Functional hop test >70% contralateral side              Swelling <1cm at joint line              No pain              Demonstrates good control on jump down      Iris received therapeutic exercises to develop strength, endurance, ROM, flexibility and core stabilization for 27  minutes including:      Stat bike x 5 min at L2 without brace   SAQ on 1/2 roll then have strap around ankle.  Pt will  "actively do SAQ then pull up for TKE and try to hold at that full extended position for 3 sec and then lower back to resting position x 10  Quad set x 10 with emphasis on lifting ankle off mat.       Hip ext prone with bent x 2/10     Wall sits x 10 sec x 3 reps    (NP)Stairs x 5 laps holding lightly on hand rail to normalize gait to adjust for slight weakness with ascent and descent       Neuromuscular re-education was performed to improve proprioception, strength and balance to restore functional capabilities in weight bearing for 30 minutes.      Performed CKC with brace on today due to progressing with more difficult activities  Heel raises x 10 B, single leg heel raise x 10   Minisquat with ball squeeze x 20  TKE on R with blueTB in standing x 20   One legged balance x 30 sec x 2  Lateral step down x 6" x 10, focus on quad control   Mule pull 17# x all 4 directions x 5 reps x 5 steps each rep    Squat touch to 18" box  x 20 holding green 10# kettle bell emphasis on mechanics      (NP)Foot clocks with focus on isometric quad on right 2x5  (NP)Single leg squat R with cone  / place down x 10  (NP)Retro treadmill, incline 3.0, speed 2.0 mph x 3 min (stopped due to fatigue)    (NP) Shuttle B LE 2 bands x 20  (NP)Shuttle 2:1 press 2 bands x20  (NP)Shuttle R LE 1 band x 2/10  (NP) Pilates press stand on 2" step 2 springs level 4 and level 3 x 2/10    Possible next visit:  forward step up with opposite hip flexion,        (pt deferred to home)  Cold pack with compression x 00 minutes  on mat at end of session  Instructed pt to continue icing knee as needed and at least daily with elevation when increased swelling noted    Home Exercises Provided and Patient Education Provided     Education provided:   - HEP  - stretch to point of tightness not pain  - exercise in pain free zone  - continue to ambulate with locked brace as quad control is poor  - increase frequency of ROM exercises   - increase frequency of quad " strengthening      Written Home Exercises Provided: yes   Exercises were reviewed and Iris was able to demonstrate them prior to the end of the session.  Iris demonstrated good  understanding of the education provided.     See EMR under Patient Instructions for exercises provided at initial eval and 11-2-21, 11-23-21, 11-29-21, 12-16-21, 1-10-22    Assessment   Patient demonstrates improvement in range of motion, strength, stabilization and function.  Pt continues with slight lag for full knee extension in short arc quad, but able to achieve with long arc quad.  Pt will benefit from further progression with [facility] activities to assist pt with full achievement of goals.  Pt ovi treatment well and further progressed one legged activities and wore brace during treatment this date for extra security as she adjusts to additional strengthening activities.  Pt performed well and no increased pain or instability noted during or at end of session      Iris is progressing towards her goals.   Pt prognosis is Good.     Pt will continue to benefit from skilled outpatient physical therapy to address the deficits listed in the problem list box on initial evaluation, provide pt/family education and to maximize pt's level of independence in the home and community environment.     Pt's spiritual, cultural and educational needs considered and pt agreeable to plan of care and goals.     Anticipated barriers to physical therapy: none    GOALS:   Short Term Goals:  6 weeks MET STG's  Increase range of motion to equal of opposite extremity  Increase strength 3 to 3+/5  D/C hinge brace to patella stabilization brace for gait.       Long Term Goals: 12 weeks   Maintain full knee ROM (Progressing, not met)  Improve muscle strength with MMT to 4+/5 to 5/5 (Progressing, not met)  Functional hop test >85% contralateral side  (Progressing, not met)  Restore ability to ambulate with normal pain free gait without brace(Progressing, not  met)  Walking for ADL and exercise will be restored without increased pain(Progressing, not met)  Restore ability to stand for ADL without increased pain MET  Restore ability to perform sit to stand transfer without difficultyMET  Restore ability to climb stairs in a reciprocal manner with min to 0 increased pain and/or difficulty  (Progressing, not met)  Restore normal sleep habits without disturbances due to pain MET  Restore ability to perform ADL's and household activities independently and without increased pain(Progressing, not met)       Plan     Plan of care Certification: .1-10-22 to 2-7-22     Outpatient Physical Therapy 2-3 times weekly for 4 weeks to include the following interventions as per MD protocol: Therapeutic exercises, manual therapy techniques neuromuscular re-education, therapeutic activities, modalities such as moist heat, ice, ultrasound and electrical stimulation, dry needling, and temporary orthotics will be considered and utilized as needed.             Naty Piña, PT

## 2022-01-10 ENCOUNTER — CLINICAL SUPPORT (OUTPATIENT)
Dept: REHABILITATION | Facility: HOSPITAL | Age: 37
End: 2022-01-10
Payer: MEDICAID

## 2022-01-10 DIAGNOSIS — R26.2 DIFFICULTY WALKING: ICD-10-CM

## 2022-01-10 DIAGNOSIS — M25.561 CHRONIC PAIN OF RIGHT KNEE: Primary | ICD-10-CM

## 2022-01-10 DIAGNOSIS — M62.81 MUSCLE WEAKNESS: ICD-10-CM

## 2022-01-10 DIAGNOSIS — M25.60 DECREASED RANGE OF MOTION: ICD-10-CM

## 2022-01-10 DIAGNOSIS — G89.29 CHRONIC PAIN OF RIGHT KNEE: Primary | ICD-10-CM

## 2022-01-10 PROCEDURE — 97110 THERAPEUTIC EXERCISES: CPT | Mod: PN | Performed by: PHYSICAL THERAPIST

## 2022-01-10 NOTE — PLAN OF CARE
Physical Therapy Reassessment/Plan of Care       Name: Iris Blanchard  Clinic Number: [id number]    Therapy Diagnosis:   Encounter Diagnoses   Name Primary?    Chronic pain of right knee Yes    Decreased range of motion     Muscle weakness     Difficulty walking      Physician: Dean Yu II, *    Visit Date: 1/10/2022    Physician Orders: see MD protocol in treatment section  Medical Diagnosis from Referral: S83.004D (ICD-10-CM) - Patellar dislocation, right, subsequent encounter  DOI 9-24-21  Evaluation Date: 10/26/2021  Authorization Period Expiration: 12-31-22  Plan of Care Expiration: 2-7-22  Visit # / Visits authorized: 2/20 (+20 prior)   MD Follow up appointment: 2-7-22     Precautions: Standard and restrictions as per protocol  Stay in patella stabilizing brace    Time In: 10:09 pt late  Time Out: 11:06  Total Billable Time: 57 minutes    Subjective     Pt reports: knee has felt stiff with cold weather.  Pt wearing brace.  Pt states doing well with no problems other than stiffness Pt states at times she is noticing that she is walking at faster pace and will feeling like normal walking at times.      Response to previous treatment: felt sore for a few hours and then was fine.    Functional change: walking with brace outside, walking with no brace in house.      Pain: 0/10 currently at worst 0/10 pain ,stiffness at times, but no pain     Location: right knee     Objective       Knee  ROM: (measured in degrees) 1-10-22 after bike but prior to other Rx  Knee (R)   Flexion 145   Extension 5 passive  0 active     After working on SAQ able to achieve 2 degrees extension       Knee  ROM: (measured in degrees) 12-10-21  Knee (R)   Flexion 135   Extension 5 passive   0 active     Flexibility testing:  - hamstrings:     90/90 test 15 B at IE  L 25  Passive SLR 30 R           - gastrocnemius:   DF ankle 10 B at IE  R 5 degrees L 10 degrees     Muscle Strength 1-10-22  MMT R   Hip flexion 4+/5   Hip  abduction 5/5   Hip extension 5/5   Glut max 4+/5   Hip adduction 5/5   Knee extension no lag with LAQ 4/5 at least   Knee flexion 5-/5           Muscle Strength 12-9-21  MMT R   Hip flexion 4/5   Hip abduction 4+/5   Hip extension 5/5   Glut max 4+/5   Hip adduction 4+/5   Knee extension  3/5 at least   Knee flexion 4+/5   Ankle dorsiflexion 5/5   Ankle plantar flexion 5/5   Ankle inversion 5/5   Ankle eversion 5/5         Muscle Strength deferred R LE L 5/5 overall at IE      Endurance is N/T.     Palpation: no TTP min atrophy noted at IE  slight TTP medial joint line of knee  Mod quad atrophy noted    Assessment   Patient demonstrates improvement in range of motion, strength, stabilization and function.  Pt continues with slight lag for full knee extension in short arc quad, but able to achieve with long arc quad.  Pt will benefit from further progression with [facility] activities to assist pt with full achievement of goals.  Pt ovi treatment well and further progressed one legged activities and wore brace during treatment this date for extra security as she adjusts to additional strengthening activities.  Pt performed well and no increased pain or instability noted during or at end of session      Iris is progressing towards her goals.   Pt prognosis is Good.     Pt will continue to benefit from skilled outpatient physical therapy to address the deficits listed in the problem list box on initial evaluation, provide pt/family education and to maximize pt's level of independence in the home and community environment.     Pt's spiritual, cultural and educational needs considered and pt agreeable to plan of care and goals.     Anticipated barriers to physical therapy: none    GOALS:   Short Term Goals:  6 weeks MET STG's  Increase range of motion to equal of opposite extremity  Increase strength 3 to 3+/5  D/C hinge brace to patella stabilization brace for gait.       Long Term Goals: 12 weeks   Maintain full knee ROM  (Progressing, not met)  Improve muscle strength with MMT to 4+/5 to 5/5 (Progressing, not met)  Functional hop test >85% contralateral side  (Progressing, not met)  Restore ability to ambulate with normal pain free gait without brace(Progressing, not met)  Walking for ADL and exercise will be restored without increased pain(Progressing, not met)  Restore ability to stand for ADL without increased pain MET  Restore ability to perform sit to stand transfer without difficultyMET  Restore ability to climb stairs in a reciprocal manner with min to 0 increased pain and/or difficulty  (Progressing, not met)  Restore normal sleep habits without disturbances due to pain MET  Restore ability to perform ADL's and household activities independently and without increased pain(Progressing, not met)       Plan     Plan of care Certification: .1-10-22 to 2-7-22     Outpatient Physical Therapy 2-3 times weekly for 4 weeks to include the following interventions as per MD protocol: Therapeutic exercises, manual therapy techniques neuromuscular re-education, therapeutic activities, modalities such as moist heat, ice, ultrasound and electrical stimulation, dry needling, and temporary orthotics will be considered and utilized as needed.             Naty Piña, PT

## 2022-01-10 NOTE — PROGRESS NOTES
Physical Therapy Daily Treatment Note     Name: Iris Blanchard  [facility] Number: [id number]    Therapy Diagnosis:   Encounter Diagnoses   Name Primary?    Chronic pain of right knee Yes    Decreased range of motion     Muscle weakness     Difficulty walking      Physician: Dean Yu II, *    Visit Date: 1/11/2022    Physician Orders: see MD protocol in treatment section  Medical Diagnosis from Referral: S83.004D (ICD-10-CM) - Patellar dislocation, right, subsequent encounter  DOI 9-24-21  Evaluation Date: 10/26/2021  Authorization Period Expiration: 12-31-22  Plan of Care Expiration: 2-7-22  Visit # / Visits authorized: 3/20 (+20 prior)   MD Follow up appointment: 2-7-22     Precautions: Standard and restrictions as per protocol  Stay in patella stabilizing brace    Time In: 11:31 pt late  Time Out: 12:00  Total Billable Time: 29 minutes    Subjective     Pt reports: feeling same as yesterday .  Pt states she wear brace for a couple of hours and then she takes brace off and on during day.  Pt states she wore brace longer than normal yesterday.  Pt states feeling a little swelling supralateral and infralateral  knee region     Response to previous treatment:no soreness   Functional change: walking with brace outside, walking with no brace in house.      Pain: 0/10 currently at worst 0/10 pain ,stiffness at times, but no pain     Location: right knee     Objective       Knee  ROM: (measured in degrees) 1-10-22 after bike but prior to other Rx  Knee (R)   Flexion 145   Extension 5 passive  0 active     After working on SAQ able to achieve 2 degrees extension       Knee  ROM: (measured in degrees) 12-10-21  Knee (R)   Flexion 135   Extension 5 passive   0 active     TREATMENT    S/p Injury 15 weeks on 1-7-22    MD protocol    Week 7:  -Continue above exercises  -Self ROM 4-5x/day using other leg to provide ROM  -8 inch step ups  -4 inch step downs  -Single leg proprioceptive training  -Lateral step out with  therabands  -Retro treadmill progressive inclines  -Sport cord (bungee) walking  -Increase resistance on stationary bike     Week 8:  -Continue above exercises  -[employer] machine  -Brisk walking  -Progress balance and board throws     Week 9:  -Bike outdoors, level surfaces only  -Start slide board  -Plyometric leg press  -8 inch step downs     Week 10:  -Should have normal ROM (equal to opposite knee)  -Begin resistance for open chain knee extension  -Jump down's (double stance landing)  -Progress to running program and light sport-specific drills if:              Quad strength >75% contralateral side              AROM 0 to >125 degrees              Functional hop test >70% contralateral side              Swelling <1cm at joint line              No pain              Demonstrates good control on jump down      Iris received therapeutic exercises to develop strength, endurance, ROM, flexibility and core stabilization for 14  minutes including:  Stairs x 2 laps holding lightly on hand rail to normalize gait to adjust for slight weakness with ascent and descent and pt lacking good control and end range of extension, popping knee back into hyperextension so held.          HELD below today  Stat bike x 5 min at L2 without brace   SAQ on 1/2 roll then have strap around ankle.  Pt will actively do SAQ then pull up for TKE and try to hold at that full extended position for 3 sec and then lower back to resting position x 10  Quad set x 10 with emphasis on lifting ankle off mat.       Hip ext prone with bent x 2/10     Wall sits x 10 sec x 3 reps        Patient received Vietnamese electrical stimulation to increase muscle recruitment and for re-education to R quad for 10 minutes with cocontraction mode, cycle time(on/off) 10/30 sec, frequency 50 bps, ramp 2 sec, duty cycle 50%, CC/CV: CC.      Neuromuscular re-education was performed to improve proprioception, strength and balance to restore functional capabilities in  "weight bearing for 15 minutes.      Worked with pt while on [place] STIM helping with eccentric contraction of TKE and able to achieve 2-3 degrees extension after 5 min of work to end of session     TKE on R with blueTB in standing x 20 in no brace        HOLD below due to time constraints  Performed CKC with brace on today due to progressing with more difficult activities  Heel raises x 10 B, single leg heel raise x 10   Minisquat with ball squeeze x 20     One legged balance x 30 sec x 2  Lateral step down x 6" x 10, focus on quad control   Mule pull 17# x all 4 directions x 5 reps x 5 steps each rep    Squat touch to 18" box  x 20 holding green 10# kettle bell emphasis on mechanics      (NP)Foot clocks with focus on isometric quad on right 2x5  (NP)Single leg squat R with cone  / place down x 10  (NP)Retro treadmill, incline 3.0, speed 2.0 mph x 3 min (stopped due to fatigue)    (NP) Shuttle B LE 2 bands x 20  (NP)Shuttle 2:1 press 2 bands x20  (NP)Shuttle R LE 1 band x 2/10  (NP) Pilates press stand on 2" step 2 springs level 4 and level 3 x 2/10    Possible next visit:  forward step up with opposite hip flexion,        (pt deferred to home)  Cold pack with compression x 00 minutes  on mat at end of session  Instructed pt to continue icing knee as needed and at least daily with elevation when increased swelling noted    Home Exercises Provided and Patient Education Provided     Education provided:   - HEP  - stretch to point of tightness not pain  - exercise in pain free zone  - continue to ambulate with locked brace as quad control is poor  - increase frequency of ROM exercises   - increase frequency of quad strengthening      Written Home Exercises Provided: continue prior HEP   Exercises were reviewed and Iris was able to demonstrate them prior to the end of the session.  Iris demonstrated good  understanding of the education provided.     See EMR under Patient Instructions for exercises provided " at initial eval and 11-2-21, 11-23-21, 11-29-21, 12-16-21, 1-10-22    Assessment   Resuming [place] stim appears to have been able to improve recruitment of quad with ability to slightly lift foot from mat with quad set with FES. Pt appears to understand to be aware of control as pt goes into extension with quick movements into quick hyperextension of knee as she lacks quad control at end range.  Pt may benefit from continuing Russian stim for TKE recruitment of quad.  .       Iris is progressing towards her goals.   Pt prognosis is Good.     Pt will continue to benefit from skilled outpatient physical therapy to address the deficits listed in the problem list box on initial evaluation, provide pt/family education and to maximize pt's level of independence in the home and community environment.     Pt's spiritual, cultural and educational needs considered and pt agreeable to plan of care and goals.     Anticipated barriers to physical therapy: none    GOALS:   Short Term Goals:  6 weeks MET STG's  Increase range of motion to equal of opposite extremity  Increase strength 3 to 3+/5  D/C hinge brace to patella stabilization brace for gait.       Long Term Goals: 12 weeks   Maintain full knee ROM (Progressing, not met)  Improve muscle strength with MMT to 4+/5 to 5/5 (Progressing, not met)  Functional hop test >85% contralateral side  (Progressing, not met)  Restore ability to ambulate with normal pain free gait without brace(Progressing, not met)  Walking for ADL and exercise will be restored without increased pain(Progressing, not met)  Restore ability to stand for ADL without increased pain MET  Restore ability to perform sit to stand transfer without difficultyMET  Restore ability to climb stairs in a reciprocal manner with min to 0 increased pain and/or difficulty  (Progressing, not met)  Restore normal sleep habits without disturbances due to pain MET  Restore ability to perform ADL's and household activities  independently and without increased pain(Progressing, not met)       Plan     Plan of care Certification: .1-10-22 to 2-7-22     Outpatient Physical Therapy 2-3 times weekly for 4 weeks to include the following interventions as per MD protocol: Therapeutic exercises, manual therapy techniques neuromuscular re-education, therapeutic activities, modalities such as moist heat, ice, ultrasound and electrical stimulation, dry needling, and temporary orthotics will be considered and utilized as needed.             Naty Piña, PT

## 2022-01-11 ENCOUNTER — CLINICAL SUPPORT (OUTPATIENT)
Dept: REHABILITATION | Facility: HOSPITAL | Age: 37
End: 2022-01-11
Payer: MEDICAID

## 2022-01-11 DIAGNOSIS — M62.81 MUSCLE WEAKNESS: ICD-10-CM

## 2022-01-11 DIAGNOSIS — M25.561 CHRONIC PAIN OF RIGHT KNEE: Primary | ICD-10-CM

## 2022-01-11 DIAGNOSIS — M25.60 DECREASED RANGE OF MOTION: ICD-10-CM

## 2022-01-11 DIAGNOSIS — R26.2 DIFFICULTY WALKING: ICD-10-CM

## 2022-01-11 DIAGNOSIS — G89.29 CHRONIC PAIN OF RIGHT KNEE: Primary | ICD-10-CM

## 2022-01-11 PROCEDURE — 97110 THERAPEUTIC EXERCISES: CPT | Mod: PN | Performed by: PHYSICAL THERAPIST

## 2022-01-13 ENCOUNTER — CLINICAL SUPPORT (OUTPATIENT)
Dept: REHABILITATION | Facility: HOSPITAL | Age: 37
End: 2022-01-13
Payer: MEDICAID

## 2022-01-13 DIAGNOSIS — G89.29 CHRONIC PAIN OF RIGHT KNEE: ICD-10-CM

## 2022-01-13 DIAGNOSIS — M25.561 CHRONIC PAIN OF RIGHT KNEE: ICD-10-CM

## 2022-01-13 DIAGNOSIS — M25.60 DECREASED RANGE OF MOTION: ICD-10-CM

## 2022-01-13 DIAGNOSIS — M62.81 MUSCLE WEAKNESS: ICD-10-CM

## 2022-01-13 DIAGNOSIS — R26.2 DIFFICULTY WALKING: ICD-10-CM

## 2022-01-13 PROCEDURE — 97110 THERAPEUTIC EXERCISES: CPT | Mod: PN,CQ

## 2022-01-13 NOTE — PROGRESS NOTES
Physical Therapy Daily Treatment Note     Name: Iris Blanchard  [facility] Number: [id number]    Therapy Diagnosis:   Encounter Diagnoses   Name Primary?    Chronic pain of right knee     Decreased range of motion     Muscle weakness     Difficulty walking      Physician: Dean Yu II, *    Visit Date: 1/13/2022    Physician Orders: see MD protocol in treatment section  Medical Diagnosis from Referral: S83.004D (ICD-10-CM) - Patellar dislocation, right, subsequent encounter  DOI 9-24-21  Evaluation Date: 10/26/2021  Authorization Period Expiration: 12-31-22  Plan of Care Expiration: 2-7-22  Visit # / Visits authorized: 3/20 (+20 prior)   MD Follow up appointment: 2-7-22     Precautions: Standard and restrictions as per protocol  Stay in patella stabilizing brace    Time In: 11:20 pt arrived late  Time Out: 12:00  Total Billable Time: 40 minutes    Subjective     Pt reports: doing better with improving quad movement. Has been wearing the brace less. States swelling has been less as well.     Response to previous treatment:no soreness   Functional change: walking with brace outside, walking with no brace in house.      Pain: 0/10 currently at worst 0/10 pain ,stiffness at times, but no pain     Location: right knee     Objective       Knee  ROM: (measured in degrees) 1-10-22 after bike but prior to other Rx  Knee (R)   Flexion 145   Extension 5 passive  0 active     After working on SAQ able to achieve 2 degrees extension       Knee  ROM: (measured in degrees) 12-10-21  Knee (R)   Flexion 135   Extension 5 passive   0 active     TREATMENT    S/p Injury 15 weeks on 1-7-22    MD protocol    Week 7:  -Continue above exercises  -Self ROM 4-5x/day using other leg to provide ROM  -8 inch step ups  -4 inch step downs  -Single leg proprioceptive training  -Lateral step out with therabands  -Retro treadmill progressive inclines  -Sport cord (bungee) walking  -Increase resistance on stationary bike     Week  "8:  -Continue above exercises  -Stairmaster machine  -Brisk walking  -Progress balance and board throws     Week 9:  -Bike outdoors, level surfaces only  -Start slide board  -Plyometric leg press  -8 inch step downs     Week 10:  -Should have normal ROM (equal to opposite knee)  -Begin resistance for open chain knee extension  -Jump down's (double stance landing)  -Progress to running program and light sport-specific drills if:              Quad strength >75% contralateral side              AROM 0 to >125 degrees              Functional hop test >70% contralateral side              Swelling <1cm at joint line              No pain              Demonstrates good control on jump down      Iris received therapeutic exercises to develop strength, endurance, ROM, flexibility and core stabilization for 22 minutes including:  Stairs x 2 trials  holding lightly on hand rail to normalize gait to adjust for slight weakness with ascent and descent    Eccentric step down 4" (from bottom stair to 2" step) x 15 - can probably do full 6" next time  Wall sits x 15 sec x 3 reps  Hip ext prone with bent x 2/10      HELD below exercises today due to time constraints:  Stat bike x 5 min at L2 without brace   SAQ on 1/2 roll then have strap around ankle.  Pt will actively do SAQ then pull up for TKE and try to hold at that full extended position for 3 sec and then lower back to resting position x 10  Quad set x 10 with emphasis on lifting ankle off mat.             Patient received Colombian electrical stimulation to increase muscle recruitment and for re-education to R quad for 8 minutes with cocontraction mode, cycle time(on/off) 10/20 sec, frequency 50 bps, ramp 2 sec, duty cycle 50%, CC/CV: CC.      Neuromuscular re-education was performed to improve proprioception, strength and balance to restore functional capabilities in weight bearing for 10 minutes.      Worked with pt while on [place] STIM helping with eccentric contraction of " "TKE and able to achieve 2-3 degrees extension after 3 min of work to end of session   TKE on R with blueTB in standing x 20 in no brace        HOLD below due to time constraints  Performed CKC with brace on today due to progressing with more difficult activities  Heel raises x 10 B, single leg heel raise x 10   Minisquat with ball squeeze x 20    One legged balance x 30 sec x 2  Mule pull 17# x all 4 directions x 5 reps x 5 steps each rep    Squat touch to 18" box  x 20 holding green 10# arturotle bell emphasis on mechanics - next time try 12" box + 4" step      (NP)Foot clocks with focus on isometric quad on right 2x5  (NP)Single leg squat R with cone  / place down x 10  (NP)Retro treadmill, incline 3.0, speed 2.0 mph x 3 min (stopped due to fatigue)    (NP) Shuttle B LE 2 bands x 20  (NP)Shuttle 2:1 press 2 bands x20  (NP)Shuttle R LE 1 band x 2/10  (NP) Pilates press stand on 2" step 2 springs level 4 and level 3 x 2/10    Possible next visit:  forward step up with opposite hip flexion,        (pt deferred to home)  Cold pack with compression x 00 minutes  on mat at end of session  Instructed pt to continue icing knee as needed and at least daily with elevation when increased swelling noted    Home Exercises Provided and Patient Education Provided     Education provided:   - HEP  - stretch to point of tightness not pain  - exercise in pain free zone  - continue to ambulate with locked brace as quad control is poor  - increase frequency of ROM exercises   - increase frequency of quad strengthening      Written Home Exercises Provided: continue prior HEP   Exercises were reviewed and Iris was able to demonstrate them prior to the end of the session.  Iris demonstrated good  understanding of the education provided.     See EMR under Patient Instructions for exercises provided at initial eval and 11-2-21, 11-23-21, 11-29-21, 12-16-21, 1-10-22    Assessment   Patient continues to improve with quad and VMO " activation with quad sets, noted improvement of quad control with eccentric step downs as well as with ascending and descending stairs. Cues were given with several exercises to take time and maintain slow, controlled movements. She will benefit from continued progression as able for further strength, endurance, and motor control.       Iris is progressing towards her goals.   Pt prognosis is Good.     Pt will continue to benefit from skilled outpatient physical therapy to address the deficits listed in the problem list box on initial evaluation, provide pt/family education and to maximize pt's level of independence in the home and community environment.     Pt's spiritual, cultural and educational needs considered and pt agreeable to plan of care and goals.     Anticipated barriers to physical therapy: none    GOALS:   Short Term Goals:  6 weeks MET STG's  Increase range of motion to equal of opposite extremity  Increase strength 3 to 3+/5  D/C hinge brace to patella stabilization brace for gait.       Long Term Goals: 12 weeks   Maintain full knee ROM (Progressing, not met)  Improve muscle strength with MMT to 4+/5 to 5/5 (Progressing, not met)  Functional hop test >85% contralateral side  (Progressing, not met)  Restore ability to ambulate with normal pain free gait without brace(Progressing, not met)  Walking for ADL and exercise will be restored without increased pain(Progressing, not met)  Restore ability to stand for ADL without increased pain MET  Restore ability to perform sit to stand transfer without difficultyMET  Restore ability to climb stairs in a reciprocal manner with min to 0 increased pain and/or difficulty  (Progressing, not met)  Restore normal sleep habits without disturbances due to pain MET  Restore ability to perform ADL's and household activities independently and without increased pain(Progressing, not met)       Plan     Plan of care Certification: .1-10-22 to 2-7-22     Outpatient  Physical Therapy 2-3 times weekly for 4 weeks to include the following interventions as per MD protocol: Therapeutic exercises, manual therapy techniques neuromuscular re-education, therapeutic activities, modalities such as moist heat, ice, ultrasound and electrical stimulation, dry needling, and temporary orthotics will be considered and utilized as needed.         Nannette Lebron, PTA

## 2022-01-14 NOTE — PROGRESS NOTES
Physical Therapy Daily Treatment Note     Name: Iris Blanchard  Clinic Number: [id number]    Therapy Diagnosis:   Encounter Diagnoses   Name Primary?    Chronic pain of right knee Yes    Decreased range of motion     Muscle weakness     Difficulty walking      Physician: Dean Yu II, *    Visit Date: 1/18/2022    Physician Orders: see MD protocol in treatment section  Medical Diagnosis from Referral: S83.004D (ICD-10-CM) - Patellar dislocation, right, subsequent encounter  DOI 9-24-21  Evaluation Date: 10/26/2021  Authorization Period Expiration: 12-31-22  Plan of Care Expiration: 2-7-22  Visit # / Visits authorized: 3/20 (+20 prior)   MD Follow up appointment: 2-7-22     Precautions: Standard and restrictions as per protocol  Stay in patella stabilizing brace    Time In: 10:10  Time Out: 11:05  Total Billable Time: 40 minutes + ES    Subjective     Pt reports: feeling tired. Pt states knee feels stiff with cold weather.      Response to previous treatment:no soreness   Functional change: walking with brace outside, walking with no brace in house.      Pain: 0/10 currently at worst 0/10 pain ,stiffness at times, but no pain     Location: right knee     Objective       Knee  ROM: (measured in degrees) 1-18-22 after bike but prior to other Rx  Knee (R)   Flexion 145   Extension 5 passive  0 active     After working on quad set with FES  able to achieve 5 degrees extension at times.     Knee  ROM: (measured in degrees) 12-10-21  Knee (R)   Flexion 135   Extension 5 passive   0 active     TREATMENT    S/p Injury 15 weeks on 1-7-22    MD protocol    Week 7:  -Continue above exercises  -Self ROM 4-5x/day using other leg to provide ROM  -8 inch step ups  -4 inch step downs  -Single leg proprioceptive training  -Lateral step out with therabands  -Retro treadmill progressive inclines  -Sport cord (bungee) walking  -Increase resistance on stationary bike     Week 8:  -Continue above exercises  -Stairmaster  "machine  -Brisk walking  -Progress balance and board throws     Week 9:  -Bike outdoors, level surfaces only  -Start slide board  -Plyometric leg press  -8 inch step downs     Week 10:  -Should have normal ROM (equal to opposite knee)  -Begin resistance for open chain knee extension  -Jump down's (double stance landing)  -Progress to running program and light sport-specific drills if:              Quad strength >75% contralateral side              AROM 0 to >125 degrees              Functional hop test >70% contralateral side              Swelling <1cm at joint line              No pain              Demonstrates good control on jump down      Iris received therapeutic exercises to develop strength, endurance, ROM, flexibility and core stabilization for 20 minutes including:  Stat bike x 5 min at L3 without brace  Heel slides x 20    Stairs x 2 trials  holding lightly on hand rail to normalize gait to adjust for slight weakness with ascent and descent   Eccentric step down 6" x 20  Wall sits x 15 sec x 3 reps  Hip ext prone with bent x 2/10      HELD below exercises today due to time constraints:     SAQ on 1/2 roll then have strap around ankle.  Pt will actively do SAQ then pull up for TKE and try to hold at that full extended position for 3 sec and then lower back to resting position x 10  Quad set x 10 with emphasis on lifting ankle off mat.      Patient received [place] electrical stimulation to increase muscle recruitment and for re-education to R quad for10 minutes with cocontraction mode, cycle time(on/off) 10/20 sec, frequency 50 bps, ramp 2 sec, duty cycle 50%, CC/CV: CC.      Neuromuscular re-education was performed to improve proprioception, strength and balance to restore functional capabilities in weight bearing for 20 minutes.      Worked with pt while on [place] STIM helping with eccentric contraction of TKE and able to achieve 2-3 degrees extension during ES 5 min of work to end of session   TKE on " "R with blueTB in standing x 20 in no brace    One legged balance x 30 sec x 2  Mule pull 17# x all 4 directions x 5 reps x 5 steps each rep      HOLD below due to time constraints  Performed CKC with brace on today due to progressing with more difficult activities  Heel raises x 10 B, single leg heel raise x 10   Minisquat with ball squeeze x 20    Squat touch to 18" box  x 20 holding green 10# alexander bell emphasis on mechanics - next time try 12" box + 4" step      (NP)Foot clocks with focus on isometric quad on right 2x5  (NP)Single leg squat R with cone  / place down x 10  (NP)Retro treadmill, incline 3.0, speed 2.0 mph x 3 min (stopped due to fatigue)    (NP) Shuttle B LE 2 bands x 20  (NP)Shuttle 2:1 press 2 bands x20  (NP)Shuttle R LE 1 band x 2/10  (NP) Pilates press stand on 2" step 2 springs level 4 and level 3 x 2/10    Possible next visit:  forward step up with opposite hip flexion,        (pt deferred to home)  Cold pack with compression x 00 minutes  on mat at end of session  Instructed pt to continue icing knee as needed and at least daily with elevation when increased swelling noted    Home Exercises Provided and Patient Education Provided     Education provided:   - HEP  - stretch to point of tightness not pain  - exercise in pain free zone  - continue to ambulate with locked brace as quad control is poor  - increase frequency of ROM exercises   - increase frequency of quad strengthening      Written Home Exercises Provided: continue prior HEP   Exercises were reviewed and Iris was able to demonstrate them prior to the end of the session.  Iris demonstrated good  understanding of the education provided.     See EMR under Patient Instructions for exercises provided at initial eval and 11-2-21, 11-23-21, 11-29-21, 12-16-21, 1-10-22    Assessment   Pt continues to benefit from FES with quad sets to get TKE. Pt initially able to lift a couple of degrees at end had a few reps of 5 degrees  Pt " "with improved ability to perform step down moving to 6" step.  Pt with full ROM despite feeling stiff.  Roberto treatment well and handled mule pull well.        Iris is progressing towards her goals.   Pt prognosis is Good.     Pt will continue to benefit from skilled outpatient physical therapy to address the deficits listed in the problem list box on initial evaluation, provide pt/family education and to maximize pt's level of independence in the home and community environment.     Pt's spiritual, cultural and educational needs considered and pt agreeable to plan of care and goals.     Anticipated barriers to physical therapy: none    GOALS:   Short Term Goals:  6 weeks MET STG's  Increase range of motion to equal of opposite extremity  Increase strength 3 to 3+/5  D/C hinge brace to patella stabilization brace for gait.       Long Term Goals: 12 weeks   Maintain full knee ROM (Progressing, not met)  Improve muscle strength with MMT to 4+/5 to 5/5 (Progressing, not met)  Functional hop test >85% contralateral side  (Progressing, not met)  Restore ability to ambulate with normal pain free gait without brace(Progressing, not met)  Walking for ADL and exercise will be restored without increased pain(Progressing, not met)  Restore ability to stand for ADL without increased pain MET  Restore ability to perform sit to stand transfer without difficultyMET  Restore ability to climb stairs in a reciprocal manner with min to 0 increased pain and/or difficulty  (Progressing, not met)  Restore normal sleep habits without disturbances due to pain MET  Restore ability to perform ADL's and household activities independently and without increased pain(Progressing, not met)       Plan     Plan of care Certification: .1-10-22 to 2-7-22     Outpatient Physical Therapy 2-3 times weekly for 4 weeks to include the following interventions as per MD protocol: Therapeutic exercises, manual therapy techniques neuromuscular re-education, " therapeutic activities, modalities such as moist heat, ice, ultrasound and electrical stimulation, dry needling, and temporary orthotics will be considered and utilized as needed.         Naty Piña, PT

## 2022-01-18 ENCOUNTER — CLINICAL SUPPORT (OUTPATIENT)
Dept: REHABILITATION | Facility: HOSPITAL | Age: 37
End: 2022-01-18
Payer: MEDICAID

## 2022-01-18 DIAGNOSIS — M25.60 DECREASED RANGE OF MOTION: ICD-10-CM

## 2022-01-18 DIAGNOSIS — G89.29 CHRONIC PAIN OF RIGHT KNEE: Primary | ICD-10-CM

## 2022-01-18 DIAGNOSIS — M25.561 CHRONIC PAIN OF RIGHT KNEE: Primary | ICD-10-CM

## 2022-01-18 DIAGNOSIS — M62.81 MUSCLE WEAKNESS: ICD-10-CM

## 2022-01-18 DIAGNOSIS — R26.2 DIFFICULTY WALKING: ICD-10-CM

## 2022-01-18 PROCEDURE — 97110 THERAPEUTIC EXERCISES: CPT | Mod: PN | Performed by: PHYSICAL THERAPIST

## 2022-01-19 ENCOUNTER — OFFICE VISIT (OUTPATIENT)
Dept: FAMILY MEDICINE | Facility: CLINIC | Age: 37
End: 2022-01-19
Payer: MEDICAID

## 2022-01-19 VITALS
WEIGHT: 133 LBS | SYSTOLIC BLOOD PRESSURE: 108 MMHG | HEART RATE: 74 BPM | HEIGHT: 59 IN | DIASTOLIC BLOOD PRESSURE: 80 MMHG | BODY MASS INDEX: 26.81 KG/M2

## 2022-01-19 DIAGNOSIS — F90.9 ADULT ADHD: ICD-10-CM

## 2022-01-19 PROCEDURE — 99213 OFFICE O/P EST LOW 20 MIN: CPT | Performed by: FAMILY MEDICINE

## 2022-01-19 PROCEDURE — 3079F PR MOST RECENT DIASTOLIC BLOOD PRESSURE 80-89 MM HG: ICD-10-PCS | Mod: ,,, | Performed by: FAMILY MEDICINE

## 2022-01-19 PROCEDURE — 3079F DIAST BP 80-89 MM HG: CPT | Mod: ,,, | Performed by: FAMILY MEDICINE

## 2022-01-19 PROCEDURE — 99213 OFFICE O/P EST LOW 20 MIN: CPT | Mod: S$PBB,,, | Performed by: FAMILY MEDICINE

## 2022-01-19 PROCEDURE — 3008F PR BODY MASS INDEX (BMI) DOCUMENTED: ICD-10-PCS | Mod: ,,, | Performed by: FAMILY MEDICINE

## 2022-01-19 PROCEDURE — 3074F PR MOST RECENT SYSTOLIC BLOOD PRESSURE < 130 MM HG: ICD-10-PCS | Mod: ,,, | Performed by: FAMILY MEDICINE

## 2022-01-19 PROCEDURE — 3008F BODY MASS INDEX DOCD: CPT | Mod: ,,, | Performed by: FAMILY MEDICINE

## 2022-01-19 PROCEDURE — 99213 PR OFFICE/OUTPT VISIT, EST, LEVL III, 20-29 MIN: ICD-10-PCS | Mod: S$PBB,,, | Performed by: FAMILY MEDICINE

## 2022-01-19 PROCEDURE — 3074F SYST BP LT 130 MM HG: CPT | Mod: ,,, | Performed by: FAMILY MEDICINE

## 2022-01-19 RX ORDER — DEXTROAMPHETAMINE SACCHARATE, AMPHETAMINE ASPARTATE MONOHYDRATE, DEXTROAMPHETAMINE SULFATE AND AMPHETAMINE SULFATE 6.25; 6.25; 6.25; 6.25 MG/1; MG/1; MG/1; MG/1
25 CAPSULE, EXTENDED RELEASE ORAL DAILY
Qty: 30 CAPSULE | Refills: 0 | Status: SHIPPED | OUTPATIENT
Start: 2022-01-19 | End: 2022-04-18 | Stop reason: ALTCHOICE

## 2022-01-19 RX ORDER — DEXTROAMPHETAMINE SACCHARATE, AMPHETAMINE ASPARTATE MONOHYDRATE, DEXTROAMPHETAMINE SULFATE AND AMPHETAMINE SULFATE 7.5; 7.5; 7.5; 7.5 MG/1; MG/1; MG/1; MG/1
30 CAPSULE, EXTENDED RELEASE ORAL EVERY MORNING
Qty: 30 CAPSULE | Refills: 0 | Status: SHIPPED | OUTPATIENT
Start: 2022-03-19 | End: 2022-04-18 | Stop reason: SDUPTHER

## 2022-01-19 RX ORDER — DEXTROAMPHETAMINE SACCHARATE, AMPHETAMINE ASPARTATE MONOHYDRATE, DEXTROAMPHETAMINE SULFATE AND AMPHETAMINE SULFATE 7.5; 7.5; 7.5; 7.5 MG/1; MG/1; MG/1; MG/1
30 CAPSULE, EXTENDED RELEASE ORAL DAILY
Qty: 30 CAPSULE | Refills: 0 | Status: SHIPPED | OUTPATIENT
Start: 2022-01-19 | End: 2022-10-25

## 2022-01-19 RX ORDER — DEXTROAMPHETAMINE SACCHARATE, AMPHETAMINE ASPARTATE MONOHYDRATE, DEXTROAMPHETAMINE SULFATE AND AMPHETAMINE SULFATE 7.5; 7.5; 7.5; 7.5 MG/1; MG/1; MG/1; MG/1
30 CAPSULE, EXTENDED RELEASE ORAL DAILY
Qty: 30 CAPSULE | Refills: 0 | Status: SHIPPED | OUTPATIENT
Start: 2022-02-19 | End: 2022-10-25

## 2022-01-19 RX ORDER — DEXTROAMPHETAMINE SACCHARATE, AMPHETAMINE ASPARTATE MONOHYDRATE, DEXTROAMPHETAMINE SULFATE AND AMPHETAMINE SULFATE 6.25; 6.25; 6.25; 6.25 MG/1; MG/1; MG/1; MG/1
25 CAPSULE, EXTENDED RELEASE ORAL DAILY
Qty: 30 CAPSULE | Refills: 0 | Status: SHIPPED | OUTPATIENT
Start: 2022-02-19 | End: 2022-04-18 | Stop reason: ALTCHOICE

## 2022-01-19 RX ORDER — DEXTROAMPHETAMINE SACCHARATE, AMPHETAMINE ASPARTATE MONOHYDRATE, DEXTROAMPHETAMINE SULFATE AND AMPHETAMINE SULFATE 6.25; 6.25; 6.25; 6.25 MG/1; MG/1; MG/1; MG/1
25 CAPSULE, EXTENDED RELEASE ORAL DAILY
Qty: 30 CAPSULE | Refills: 0 | Status: SHIPPED | OUTPATIENT
Start: 2022-03-19 | End: 2022-04-18 | Stop reason: ALTCHOICE

## 2022-01-19 NOTE — PROGRESS NOTES
"  SUBJECTIVE:    Patient ID: Iris Blanchard is a 36 y.o. female.    Chief Complaint: ADHD (3m f/u, print prescriptions)  37 yo female new to this provider here today for refills on her ADD/ADHD medications.The patient is here today to follow up on attention deficit hyperactivity disorder. Three months ago, and was prescribed 50 mg per day amphetamine/dextroamphetamine ER. Since that time, she says she has been tolerating the medication well without chest pain, palpitations, anorexia, weight loss, or insomnia.        I reviewed her overdue health maintenance, patient is due for hepatitis-C screening, COVID-19 vaccine, pneumococcal vaccine, HIV screening, cervical cancer screening, tetanus vaccine    Significant past medical Hx  ADD: Adderall XR 55mg  HSV: Valtrex 500mg  Acne: Clindamycin 1%, Doxycycline 100mg  Contraception: Mirena    Specialists:  Ortho: Dr Yu  GYN: Dr Garrido  Derm: Dr Jones     Smoke: 1/2 ppd  ETOH: None  Exercise: walking the dog    HPI    ADHD Adult: On Medications  Have difficulty sustaining attention in tasks or fun activities?  no  Don't follow through on instructions and fail to finish work?  no  Have difficulty organizing tasks and activities?  no  Avoid, dislike, or are reluctant to engage in work thar requires sustained mental effort?  no  Easily distracted?  no  Forgetful in daily activities?  no  Fidget with hands or feet, or squirm in seat?  no  Have difficulty engaging in leisure activities or doing fun things quietly?  no  Feel "on the go" or "driven by a motor"?  no  Blurt out answers before questions have been completed?  no  Have difficulty waiting your turn, are impatient?  no  Interrupt or intrude on others?  no       Past Medical History:   Diagnosis Date    ADHD (attention deficit hyperactivity disorder)     Anxiety      Social History     Socioeconomic History    Marital status: Single   Occupational History    Occupation: unemployed   Tobacco Use    Smoking " status: Current Every Day Smoker     Packs/day: 0.50     Types: Cigarettes    Smokeless tobacco: Never Used   Substance and Sexual Activity    Alcohol use: No    Drug use: No    Sexual activity: Yes     Partners: Female     Birth control/protection: I.U.D.     Past Surgical History:   Procedure Laterality Date    EYE SURGERY       Family History   Problem Relation Age of Onset    Hypertension Mother     Diabetes Father      Current Outpatient Medications   Medication Sig Dispense Refill    cetirizine (ZYRTEC) 10 MG tablet Take 1 tablet (10 mg total) by mouth once daily. 30 tablet 5    clindamycin (CLEOCIN T) 1 % external solution APPLY TO SKIN BID  5    clobetasoL (TEMOVATE) 0.05 % external solution USE TWICE DAILY AS NEEDED      doxycycline (VIBRAMYCIN) 100 MG Cap Take 100 mg by mouth 2 (two) times daily.      naproxen (NAPROSYN) 500 MG tablet Take 1 tablet (500 mg total) by mouth after meals as needed (headache or pain). 60 tablet 3    triamcinolone acetonide 0.1% (KENALOG) 0.1 % cream APPLY TOPICALLY TO THE AFFECTED AREA TWICE DAILY AS NEEDED      valACYclovir (VALTREX) 500 MG tablet TK 1 T PO QD  10    [START ON 3/19/2022] dextroamphetamine-amphetamine (ADDERALL XR) 25 MG 24 hr capsule Take 1 capsule (25 mg total) by mouth once daily. Take with 30mg cap 30 capsule 0    [START ON 2/19/2022] dextroamphetamine-amphetamine (ADDERALL XR) 25 MG 24 hr capsule Take 1 capsule (25 mg total) by mouth once daily. Take with 30mg cap 30 capsule 0    dextroamphetamine-amphetamine (ADDERALL XR) 25 MG 24 hr capsule Take 1 capsule (25 mg total) by mouth once daily. Take with 30mg cap 30 capsule 0    [START ON 3/19/2022] dextroamphetamine-amphetamine (ADDERALL XR) 30 MG 24 hr capsule Take 1 capsule (30 mg total) by mouth every morning. Take with 25mg cap 30 capsule 0    [START ON 2/19/2022] dextroamphetamine-amphetamine (ADDERALL XR) 30 MG 24 hr capsule Take 1 capsule (30 mg total) by mouth once daily. Take with  "25mg xr. Brand Name medically necessary. 30 capsule 0    dextroamphetamine-amphetamine (ADDERALL XR) 30 MG 24 hr capsule Take 1 capsule (30 mg total) by mouth once daily. Take with 25mg xr. Brand Name medically necessary. 30 capsule 0     No current facility-administered medications for this visit.     Review of patient's allergies indicates:  No Known Allergies    Review of Systems   Constitutional: Negative for activity change, appetite change, fatigue and unexpected weight change.   HENT: Negative for congestion, ear pain, hearing loss, postnasal drip, sinus pressure, sinus pain, sneezing and sore throat.    Eyes: Negative for photophobia and pain.   Respiratory: Negative for cough, chest tightness, shortness of breath and wheezing.    Cardiovascular: Negative for chest pain, palpitations and leg swelling.   Gastrointestinal: Negative for abdominal distention, abdominal pain, blood in stool, constipation, diarrhea, nausea and vomiting.   Endocrine: Negative for cold intolerance, heat intolerance, polydipsia and polyuria.   Genitourinary: Negative for difficulty urinating, dysuria, flank pain, frequency, hematuria, pelvic pain and urgency.   Musculoskeletal: Positive for arthralgias (Chronic right knee pain). Negative for back pain, joint swelling, myalgias and neck pain.   Skin: Negative for pallor.   Allergic/Immunologic: Negative for environmental allergies and food allergies.   Neurological: Negative for dizziness, weakness, light-headedness and numbness.   Hematological: Does not bruise/bleed easily.   Psychiatric/Behavioral: Negative for agitation, confusion, decreased concentration and sleep disturbance. The patient is not nervous/anxious.           Blood pressure 108/80, pulse 74, height 4' 11" (1.499 m), weight 60.3 kg (133 lb). Body mass index is 26.86 kg/m².   Objective:      Physical Exam  Vitals reviewed.   Constitutional:       General: She is not in acute distress.     Appearance: Normal " appearance. She is well-developed. She is not ill-appearing or toxic-appearing.   HENT:      Head: Normocephalic and atraumatic.      Right Ear: External ear normal.      Left Ear: External ear normal.   Eyes:      General:         Right eye: No discharge.         Left eye: No discharge.      Conjunctiva/sclera: Conjunctivae normal.      Pupils: Pupils are equal, round, and reactive to light.   Cardiovascular:      Rate and Rhythm: Normal rate and regular rhythm.      Heart sounds: Normal heart sounds. No murmur heard.      Pulmonary:      Effort: Pulmonary effort is normal. No respiratory distress.      Breath sounds: Normal breath sounds.   Skin:     General: Skin is warm and dry.      Capillary Refill: Capillary refill takes less than 2 seconds.   Neurological:      Mental Status: She is alert.             Assessment:       1. Adult ADHD         Plan:           Adult ADHD  -     dextroamphetamine-amphetamine (ADDERALL XR) 25 MG 24 hr capsule; Take 1 capsule (25 mg total) by mouth once daily. Take with 30mg cap  Dispense: 30 capsule; Refill: 0  -     dextroamphetamine-amphetamine (ADDERALL XR) 25 MG 24 hr capsule; Take 1 capsule (25 mg total) by mouth once daily. Take with 30mg cap  Dispense: 30 capsule; Refill: 0  -     dextroamphetamine-amphetamine (ADDERALL XR) 25 MG 24 hr capsule; Take 1 capsule (25 mg total) by mouth once daily. Take with 30mg cap  Dispense: 30 capsule; Refill: 0  -     dextroamphetamine-amphetamine (ADDERALL XR) 30 MG 24 hr capsule; Take 1 capsule (30 mg total) by mouth every morning. Take with 25mg cap  Dispense: 30 capsule; Refill: 0  -     dextroamphetamine-amphetamine (ADDERALL XR) 30 MG 24 hr capsule; Take 1 capsule (30 mg total) by mouth once daily. Take with 25mg xr. Brand Name medically necessary.  Dispense: 30 capsule; Refill: 0  -     dextroamphetamine-amphetamine (ADDERALL XR) 30 MG 24 hr capsule; Take 1 capsule (30 mg total) by mouth once daily. Take with 25mg xr. Brand Name  medically necessary.  Dispense: 30 capsule; Refill: 0

## 2022-01-26 NOTE — PROGRESS NOTES
Physical Therapy Daily Treatment Note     Name: Iris Blanchard  Clinic Number: [id number]    Therapy Diagnosis:   Encounter Diagnoses   Name Primary?    Chronic pain of right knee Yes    Decreased range of motion     Muscle weakness     Difficulty walking      Physician: Dean Yu II, *    Visit Date: 1/27/2022    Physician Orders: see MD protocol in treatment section  Medical Diagnosis from Referral: S83.004D (ICD-10-CM) - Patellar dislocation, right, subsequent encounter  DOI 9-24-21  Evaluation Date: 10/26/2021  Authorization Period Expiration: 12-31-22  Plan of Care Expiration: 2-7-22  Visit # / Visits authorized: 4/20 (+20 prior)   MD Follow up appointment: 2-7-22     Precautions: Standard and restrictions as per protocol  Stay in patella stabilizing brace    Time In: 11:11 pt late  Time Out: 12:00  Total Billable Time: 15 direct minutes + ES    Subjective     Pt reports: she has been working on ex.  Pt had been exposed to COVID, no symptoms, missed last appt  Pt states feeling stiff this AM     Response to previous treatment:no soreness   Functional change: walking with brace outside, walking with no brace in house.      Pain: 0/10 currently at worst 0/10 pain ,stiffness at times, but no pain     Location: right knee     Objective       Knee  ROM: (measured in degrees) 1-27-22 after bike but prior to other Rx  Knee (R)   Flexion 145   Extension 5 passive  02active     After working on quad set with FES  able to achieve 5 degrees extension at times.     Knee  ROM: (measured in degrees) 12-10-21  Knee (R)   Flexion 135   Extension 5 passive   0 active     TREATMENT    S/p Injury 15 weeks on 1-7-22    MD protocol    Week 7:  -Continue above exercises  -Self ROM 4-5x/day using other leg to provide ROM  -8 inch step ups  -4 inch step downs  -Single leg proprioceptive training  -Lateral step out with therabands  -Retro treadmill progressive inclines  -Sport cord (bungee) walking  -Increase resistance  "on stationary bike     Week 8:  -Continue above exercises  -[place]olan machine  -Brisk walking  -Progress balance and board throws     Week 9:  -Bike outdoors, level surfaces only  -Start slide board  -Plyometric leg press  -8 inch step downs     Week 10:  -Should have normal ROM (equal to opposite knee)  -Begin resistance for open chain knee extension  -Jump down's (double stance landing)  -Progress to running program and light sport-specific drills if:              Quad strength >75% contralateral side              AROM 0 to >125 degrees              Functional hop test >70% contralateral side              Swelling <1cm at joint line              No pain              Demonstrates good control on jump down      Iris received therapeutic exercises to develop strength, endurance, ROM, flexibility and core stabilization for 10 direct minutes including:  Stat bike x 5 min at L3 without brace  PT assisted by PT tech    Stairs x 2 trials  holding lightly on hand rail to normalize gait to adjust for slight weakness with ascent and descent   Eccentric step down 6" x 20  Wall sits x 15 sec x 3 reps  Hip ext prone with bent x 2/10      HELD below exercises today due to time constraints:     SAQ on 1/2 roll then have strap around ankle.  Pt will actively do SAQ then pull up for TKE and try to hold at that full extended position for 3 sec and then lower back to resting position x 10  Quad set x 10 with emphasis on lifting ankle off mat.      Patient received [place] electrical stimulation to increase muscle recruitment and for re-education to R quad for10 minutes with cocontraction mode, cycle time(on/off) 10/20 sec, frequency 50 bps, ramp 2 sec, duty cycle 50%, CC/CV: CC.      Neuromuscular re-education was performed to improve proprioception, strength and balance to restore functional capabilities in weight bearing for 5 direct minutes.      TKE on R with blueTB in standing x 20 in no brace   One legged balance x 30 sec " "x 2  (NP) pt did not have brace Mule pull 17# x all 4 directions x 5 reps x 5 steps each rep      HOLD below due to time constraints  Performed CKC with brace on today due to progressing with more difficult activities  Heel raises x 10 B, single leg heel raise x 10   Minisquat with ball squeeze x 20    Squat touch to 18" box  x 20 holding green 10# arturotle bell emphasis on mechanics - next time try 12" box + 4" step      (NP)Foot clocks with focus on isometric quad on right 2x5  (NP)Single leg squat R with cone  / place down x 10  (NP)Retro treadmill, incline 3.0, speed 2.0 mph x 3 min (stopped due to fatigue)    (NP) Shuttle B LE 2 bands x 20  (NP)Shuttle 2:1 press 2 bands x20  (NP)Shuttle R LE 1 band x 2/10  (NP) Pilates press stand on 2" step 2 springs level 4 and level 3 x 2/10    Possible next visit:  forward step up with opposite hip flexion,        (pt deferred to home)  Cold pack with compression x 00 minutes  on mat at end of session  Instructed pt to continue icing knee as needed and at least daily with elevation when increased swelling noted    Home Exercises Provided and Patient Education Provided     Education provided:   - HEP  - stretch to point of tightness not pain  - exercise in pain free zone  - continue to ambulate with locked brace as quad control is poor  - increase frequency of ROM exercises   - increase frequency of quad strengthening      Written Home Exercises Provided: yes lateral step downs   Exercises were reviewed and Iris was able to demonstrate them prior to the end of the session.  Iris demonstrated good  understanding of the education provided.     See EMR under Patient Instructions for exercises provided at initial eval and 11-2-21, 11-23-21, 11-29-21, 12-16-21, 1-10-22, 1-27-22    Assessment   Pt continues to benefit from FES with quad sets to get full TKE. Pt initially able to lift a couple of degrees at end had a few reps of 5 degrees Pt improving with stairs, but " has not been working on stairs at home so added lateral step down for further quad strengthening and to further improve stair climbing Pt ovi treatment well and will see pt 1 more week and may be ready for discharge     Iris is progressing towards her goals.   Pt prognosis is Good.     Pt will continue to benefit from skilled outpatient physical therapy to address the deficits listed in the problem list box on initial evaluation, provide pt/family education and to maximize pt's level of independence in the home and community environment.     Pt's spiritual, cultural and educational needs considered and pt agreeable to plan of care and goals.     Anticipated barriers to physical therapy: none    GOALS:   Short Term Goals:  6 weeks MET STG's  Increase range of motion to equal of opposite extremity  Increase strength 3 to 3+/5  D/C hinge brace to patella stabilization brace for gait.       Long Term Goals: 12 weeks   Maintain full knee ROM (Progressing, not met)  Improve muscle strength with MMT to 4+/5 to 5/5 (Progressing, not met)  Functional hop test >85% contralateral side  (Progressing, not met)  Restore ability to ambulate with normal pain free gait without brace(Progressing, not met)  Walking for ADL and exercise will be restored without increased pain(Progressing, not met)  Restore ability to stand for ADL without increased pain MET  Restore ability to perform sit to stand transfer without difficultyMET  Restore ability to climb stairs in a reciprocal manner with min to 0 increased pain and/or difficulty  (Progressing, not met)  Restore normal sleep habits without disturbances due to pain MET  Restore ability to perform ADL's and household activities independently and without increased pain(Progressing, not met)       Plan     Plan of care Certification: .1-10-22 to 2-7-22     Outpatient Physical Therapy 2-3 times weekly for 4 weeks to include the following interventions as per MD protocol: Therapeutic  exercises, manual therapy techniques neuromuscular re-education, therapeutic activities, modalities such as moist heat, ice, ultrasound and electrical stimulation, dry needling, and temporary orthotics will be considered and utilized as needed.         Naty Piña, PT

## 2022-01-27 ENCOUNTER — CLINICAL SUPPORT (OUTPATIENT)
Dept: REHABILITATION | Facility: HOSPITAL | Age: 37
End: 2022-01-27
Payer: MEDICAID

## 2022-01-27 DIAGNOSIS — G89.29 CHRONIC PAIN OF RIGHT KNEE: Primary | ICD-10-CM

## 2022-01-27 DIAGNOSIS — M25.60 DECREASED RANGE OF MOTION: ICD-10-CM

## 2022-01-27 DIAGNOSIS — M62.81 MUSCLE WEAKNESS: ICD-10-CM

## 2022-01-27 DIAGNOSIS — R26.2 DIFFICULTY WALKING: ICD-10-CM

## 2022-01-27 DIAGNOSIS — M25.561 CHRONIC PAIN OF RIGHT KNEE: Primary | ICD-10-CM

## 2022-01-27 PROCEDURE — 97110 THERAPEUTIC EXERCISES: CPT | Mod: PN | Performed by: PHYSICAL THERAPIST

## 2022-01-31 NOTE — PROGRESS NOTES
Physical Therapy Daily Treatment Note     Name: Iris Blanchard  Clinic Number: [id number]    Therapy Diagnosis:   Encounter Diagnoses   Name Primary?    Chronic pain of right knee Yes    Decreased range of motion     Muscle weakness     Difficulty walking      Physician: Dean Yu II, *    Visit Date: 2/1/2022    Physician Orders: see MD protocol in treatment section  Medical Diagnosis from Referral: S83.004D (ICD-10-CM) - Patellar dislocation, right, subsequent encounter  DOI 9-24-21  Evaluation Date: 10/26/2021  Authorization Period Expiration: 12-31-22  Plan of Care Expiration: 2-7-22  Visit # / Visits authorized: 5/20 (+20 prior)   MD Follow up appointment: 2-7-22     Precautions: Standard and restrictions as per protocol  Stay in patella stabilizing brace    Time In: 11:10  Time Out: 12:02  Total Billable Time: 25 direct minutes     Subjective     Pt reports: she has been working on ex.     Response to previous treatment:no soreness   Functional change: walking with brace outside, walking with no brace in house.      Pain: 0/10 currently at worst 0/10 pain ,stiffness at times, but no pain     Location: right knee     Objective       Knee  ROM: (measured in degrees) 1-27-22 after bike but prior to other Rx  Knee (R)   Flexion 145   Extension 5 passive  02active     After working on quad set with FES  able to achieve 5 degrees extension at times.     Knee  ROM: (measured in degrees) 12-10-21  Knee (R)   Flexion 135   Extension 5 passive   0 active     TREATMENT    S/p Injury 15 weeks on 1-7-22    MD protocol    Week 7:  -Continue above exercises  -Self ROM 4-5x/day using other leg to provide ROM  -8 inch step ups  -4 inch step downs  -Single leg proprioceptive training  -Lateral step out with therabands  -Retro treadmill progressive inclines  -Sport cord (bungee) walking  -Increase resistance on stationary bike     Week 8:  -Continue above exercises  -Sta[employer] science machine  -Brisk walking  -Progress  "balance and board throws     Week 9:  -Bike outdoors, level surfaces only  -Start slide board  -Plyometric leg press  -8 inch step downs     Week 10:  -Should have normal ROM (equal to opposite knee)  -Begin resistance for open chain knee extension  -Jump down's (double stance landing)  -Progress to running program and light sport-specific drills if:              Quad strength >75% contralateral side              AROM 0 to >125 degrees              Functional hop test >70% contralateral side              Swelling <1cm at joint line              No pain              Demonstrates good control on jump down      Iris received therapeutic exercises to develop strength, endurance, ROM, flexibility and core stabilization for 10 direct minutes including:  Stat bike x 5 min at L3 without brace  PT assisted by PT tech    Hip ext prone with bent x 2/10   SLR x 20  SAQ  X 20  Quad set x 10 with emphasis on lifting ankle off mat.      (NP)Patient received German electrical stimulation to increase muscle recruitment and for re-education to R quad for10 minutes with cocontraction mode, cycle time(on/off) 10/20 sec, frequency 50 bps, ramp 2 sec, duty cycle 50%, CC/CV: CC.      Neuromuscular re-education was performed to improve proprioception, strength and balance to restore functional capabilities in weight bearing for 15 direct minutes.      Stairs x 2 trials  holding lightly on hand rail to normalize gait to adjust for slight weakness with ascent and descent   Eccentric step down 6" x 20  Wall sits x 15 sec x 3 reps    TKE on R with blueTB in standing x 20 in no brace   One legged balance x 30 sec x 2   Mule pull 17# x all 4 directions x 5 reps x 5 steps each rep with brace       Heel raises x 10 B, single leg heel raise x 10   Minisquat with ball squeeze x 20    Squat touch to 18" box  x 20 holding green 10# kettle bell emphasis on mechanics - next time try 12" box + 4" step      (NP)Foot clocks with focus on isometric quad " "on right 2x5  (NP)Single leg squat R with cone  / place down x 10  (NP)Retro treadmill, incline 3.0, speed 2.0 mph x 3 min (stopped due to fatigue)    (NP) Shuttle B LE 2 bands x 20  (NP)Shuttle 2:1 press 2 bands x20  (NP)Shuttle R LE 1 band x 2/10  (NP) Pilates press stand on 2" step 2 springs level 4 and level 3 x 2/10    Possible next visit:  forward step up with opposite hip flexion,        (pt deferred to home)  Cold pack with compression x 00 minutes  on mat at end of session  Instructed pt to continue icing knee as needed and at least daily with elevation when increased swelling noted    Home Exercises Provided and Patient Education Provided     Education provided:   - HEP  - stretch to point of tightness not pain  - exercise in pain free zone  - continue to ambulate with locked brace as quad control is poor  - increase frequency of ROM exercises   - increase frequency of quad strengthening      Written Home Exercises Provided: yes lateral step downs   Exercises were reviewed and Iris was able to demonstrate them prior to the end of the session.  Iris demonstrated good  understanding of the education provided.     See EMR under Patient Instructions for exercises provided at initial eval and 11-2-21, 11-23-21, 11-29-21, 12-16-21, 1-10-22, 1-27-22    Assessment   Pt able to elevate heel from mat with quad set resulting in full knee extension and no lag so able to hold FES.  Pt still lacking a little eccentric control with step down, but working at home for further strengthening.  Pt ovi treatment well and good control noted with therex and other CKC.  Pt may be ready for discharge after next session    Iris is progressing towards her goals.   Pt prognosis is Good.     Pt will continue to benefit from skilled outpatient physical therapy to address the deficits listed in the problem list box on initial evaluation, provide pt/family education and to maximize pt's level of independence in the home " and community environment.     Pt's spiritual, cultural and educational needs considered and pt agreeable to plan of care and goals.     Anticipated barriers to physical therapy: none    GOALS:   Short Term Goals:  6 weeks MET STG's  Increase range of motion to equal of opposite extremity  Increase strength 3 to 3+/5  D/C hinge brace to patella stabilization brace for gait.       Long Term Goals: 12 weeks   Maintain full knee ROM (Progressing, not met)  Improve muscle strength with MMT to 4+/5 to 5/5 (Progressing, not met)  Functional hop test >85% contralateral side  (Progressing, not met)  Restore ability to ambulate with normal pain free gait without brace(Progressing, not met)  Walking for ADL and exercise will be restored without increased pain(Progressing, not met)  Restore ability to stand for ADL without increased pain MET  Restore ability to perform sit to stand transfer without difficultyMET  Restore ability to climb stairs in a reciprocal manner with min to 0 increased pain and/or difficulty  (Progressing, not met)  Restore normal sleep habits without disturbances due to pain MET  Restore ability to perform ADL's and household activities independently and without increased pain(Progressing, not met)       Plan     Plan of care Certification: .1-10-22 to 2-7-22     Outpatient Physical Therapy 2-3 times weekly for 4 weeks to include the following interventions as per MD protocol: Therapeutic exercises, manual therapy techniques neuromuscular re-education, therapeutic activities, modalities such as moist heat, ice, ultrasound and electrical stimulation, dry needling, and temporary orthotics will be considered and utilized as needed.      Reassess 2-3-22       Naty Piña, PT

## 2022-02-01 ENCOUNTER — CLINICAL SUPPORT (OUTPATIENT)
Dept: REHABILITATION | Facility: HOSPITAL | Age: 37
End: 2022-02-01
Payer: MEDICAID

## 2022-02-01 DIAGNOSIS — R26.2 DIFFICULTY WALKING: ICD-10-CM

## 2022-02-01 DIAGNOSIS — M25.561 CHRONIC PAIN OF RIGHT KNEE: Primary | ICD-10-CM

## 2022-02-01 DIAGNOSIS — M25.60 DECREASED RANGE OF MOTION: ICD-10-CM

## 2022-02-01 DIAGNOSIS — G89.29 CHRONIC PAIN OF RIGHT KNEE: Primary | ICD-10-CM

## 2022-02-01 DIAGNOSIS — M62.81 MUSCLE WEAKNESS: ICD-10-CM

## 2022-02-01 PROCEDURE — 97110 THERAPEUTIC EXERCISES: CPT | Mod: PN | Performed by: PHYSICAL THERAPIST

## 2022-02-02 NOTE — PROGRESS NOTES
Physical Therapy Progress and Daily Treatment Note     Name: Iris Blanchard  Clinic Number: [id number]    Therapy Diagnosis:   Encounter Diagnoses   Name Primary?    Chronic pain of right knee Yes    Decreased range of motion     Muscle weakness     Difficulty walking      Physician: Dean Yu II, *    Visit Date: 2/3/2022    Physician Orders: see MD protocol in treatment section  Medical Diagnosis from Referral: S83.004D (ICD-10-CM) - Patellar dislocation, right, subsequent encounter  DOI 9-24-21  Evaluation Date: 10/26/2021  Authorization Period Expiration: 12-31-22  Plan of Care Expiration: 2-7-22  Visit # / Visits authorized: 6/20 (+20 prior)   MD Follow up appointment: 2-7-22     Precautions: Standard and restrictions as per protocol  Stay in patella stabilizing brace    Time In: 11:01  Time Out:11:55   Total Billable Time: 54 minutes     Subjective     Pt reports: overall knee is feeling better, no pain, does get stiffness at time Pt states she is able to do all ADL without brace, but 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     Response to previous treatment:no soreness   Functional change: walking with brace outside, walking with no brace in house.      Pain: 0/10 currently at worst 0/10 pain ,stiffness at times, but no pain     Location: right knee     Objective       Knee  ROM: (measured in degrees) 2-3-22 after bike but prior to other Rx  Knee (R)   Flexion 145   Extension 5 passive  5active       Knee  ROM: (measured in degrees) 12-10-21  Knee (R)   Flexion 135   Extension 5 passive   0 active     Knee  ROM: (measured in degrees)  initial eval  Knee (R) (L)   Flexion 30 passive 140   Extension 0 5          Flexibility testing:  - hamstrings:     90/90 test 15 B at IE  L 25  Passive SLR 30 R           - gastrocnemius:   DF ankle 10 B at  "IE  R 5 degrees L 10 degrees     Muscle Strength 2-3-22  MMT R   Hip flexion 5/5   Hip abduction 5/5   Hip extension 5/5   Glut max 5-/5   Hip adduction 5/5   Knee extension  5/5   Knee flexion 5/5         Muscle Strength 12-9-21  MMT R   Hip flexion 4/5   Hip abduction 4+/5   Hip extension 5/5   Glut max 4+/5   Hip adduction 4+/5   Knee extension  3/5 at least   Knee flexion 4+/5   Ankle dorsiflexion 5/5   Ankle plantar flexion 5/5   Ankle inversion 5/5   Ankle eversion 5/5         Muscle Strength deferred R LE L 5/5 overall at IE      Endurance is N/T.     Palpation: no TTP good quad tone noted noted at IE  slight TTP medial joint line of knee  Mod quad atrophy noted    CMS Impairment/Limitation/Restriction for FOTO knee Survey     Therapist reviewed FOTO scores for Iris Blanchard   FOTO documents entered into [employer] - see Media section.     Limitation Score: 18% at IE  49%    TREATMENT    S/p Injury 19 weeks on 2-4-22    MD protocol    Iris received therapeutic exercises to develop strength, endurance, ROM, flexibility and core stabilization for 29 minutes including:  Stat bike x 5 min at L3 without brace  Quad set x 10 with emphasis on lifting ankle off mat.   SLR x 20  SAQ  X 20   Hip ext prone with bent x 2/10   Quad stretch prone with strap x 10    Neuromuscular re-education was performed to improve proprioception, strength and balance to restore functional capabilities in weight bearing for 25 minutes.      Stairs x 2 trials  holding lightly on hand rail to normalize gait to adjust for slight weakness with ascent and descent   Eccentric step down 6" x 20  Wall sits x 15 sec x 3 reps    TKE on R with blueTB in standing x 20 in no brace   One legged balance x 30 sec x 2   Mule pull 17# x all 4 directions x 5 reps x 5 steps each rep with brace     Heel raises x 10 B, single leg heel raise x 10   Minisquat with ball squeeze x 20    Squat touch to12" box + 4" step    x 20 holding green 10# kettle bell emphasis " on mechanics -       (pt deferred to home)  Cold pack with compression x 00 minutes  on mat at end of session  Instructed pt to continue icing knee as needed and at least daily with elevation when increased swelling noted    Home Exercises Provided and Patient Education Provided     Education provided:   - HEP  - stretch to point of tightness not pain  - exercise in pain free zone  - continue to ambulate with locked brace as quad control is poor  - increase frequency of ROM exercises   - increase frequency of quad strengthening      Written Home Exercises Provided: yes   Exercises were reviewed and Iris was able to demonstrate them prior to the end of the session.  Iris demonstrated good  understanding of the education provided.     See EMR under Patient Instructions for exercises provided at initial eval and 11-2-21, 11-23-21, 11-29-21, 12-16-21, 1-10-22, 1-27-22    Assessment     Patient demonstrates improvement in range of motion, strength, stabilization and function.  Patient appears independent in the prescribed HEP. Discussed pt activity level and she is not involved in sports and is performing at home full capacity though does use brace for squatting activities and activities with dogs as a precaution and should be ready for discharge after nearly fully achieving the established goals.      GOALS:   Short Term Goals:  6 weeks MET STG's  Increase range of motion to equal of opposite extremity  Increase strength 3 to 3+/5  D/C hinge brace to patella stabilization brace for gait.       Long Term Goals: 12 weeks   Maintain full knee ROM  MET  Improve muscle strength with MMT to 4+/5 to 5/5MET  Functional hop test >85% contralateral side Pt able to hop distance equal to opposite extremity, but slight LOB with landing  Restore ability to ambulate with normal pain free gait without brace MET  Walking for ADL and exercise will be restored without increased painMET  Restore ability to stand for ADL without  increased pain MET  Restore ability to perform sit to stand transfer without difficultyMET  Restore ability to climb stairs in a reciprocal manner with min to 0 increased pain and/or difficulty MET  Restore normal sleep habits without disturbances due to pain MET  Restore ability to perform ADL's and household activities independently and without increased pain(Progressing, not met)       Plan   If you concur, I recommend patient be discharged from physical therapy.   Please advise us of your findings and recommendations. Thank you for allowing us to assist in the care of your patient.       Naty Piña, PT     I certify the need for these services furnished under this plan of treatment and while under my care.    ____________________________________ Physician/Referring Practitioner                                  Date of Signature

## 2022-02-03 ENCOUNTER — CLINICAL SUPPORT (OUTPATIENT)
Dept: REHABILITATION | Facility: HOSPITAL | Age: 37
End: 2022-02-03
Payer: MEDICAID

## 2022-02-03 DIAGNOSIS — G89.29 CHRONIC PAIN OF RIGHT KNEE: Primary | ICD-10-CM

## 2022-02-03 DIAGNOSIS — M62.81 MUSCLE WEAKNESS: ICD-10-CM

## 2022-02-03 DIAGNOSIS — M25.561 CHRONIC PAIN OF RIGHT KNEE: Primary | ICD-10-CM

## 2022-02-03 DIAGNOSIS — R26.2 DIFFICULTY WALKING: ICD-10-CM

## 2022-02-03 DIAGNOSIS — M25.60 DECREASED RANGE OF MOTION: ICD-10-CM

## 2022-02-03 PROCEDURE — 97110 THERAPEUTIC EXERCISES: CPT | Mod: PN | Performed by: PHYSICAL THERAPIST

## 2022-02-03 NOTE — PLAN OF CARE
Physical Therapy Progress and Daily Treatment Note     Name: Iris Blanchard  Clinic Number: [id number]    Therapy Diagnosis:   Encounter Diagnoses   Name Primary?    Chronic pain of right knee Yes    Decreased range of motion     Muscle weakness     Difficulty walking      Physician: Dean Yu II, *    Visit Date: 2/3/2022    Physician Orders: see MD protocol in treatment section  Medical Diagnosis from Referral: S83.004D (ICD-10-CM) - Patellar dislocation, right, subsequent encounter  DOI 9-24-21  Evaluation Date: 10/26/2021  Authorization Period Expiration: 12-31-22  Plan of Care Expiration: 2-7-22  Visit # / Visits authorized: 6/20 (+20 prior)   MD Follow up appointment: 2-7-22     Precautions: Standard and restrictions as per protocol  Stay in patella stabilizing brace    Time In: 11:01  Time Out:11:55   Total Billable Time: 54 minutes     Subjective     Pt reports: overall knee is feeling better, no pain, does get stiffness at time Pt states she is able to do all ADL without brace, but 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     Response to previous treatment:no soreness   Functional change: walking with brace outside, walking with no brace in house.      Pain: 0/10 currently at worst 0/10 pain ,stiffness at times, but no pain     Location: right knee     Objective       Knee  ROM: (measured in degrees) 2-3-22 after bike but prior to other Rx  Knee (R)   Flexion 145   Extension 5 passive  5active       Knee  ROM: (measured in degrees) 12-10-21  Knee (R)   Flexion 135   Extension 5 passive   0 active     Knee  ROM: (measured in degrees)  initial eval  Knee (R) (L)   Flexion 30 passive 140   Extension 0 5          Flexibility testing:  - hamstrings:     90/90 test 15 B at IE  L 25  Passive SLR 30 R           - gastrocnemius:   DF ankle 10 B at  "IE  R 5 degrees L 10 degrees     Muscle Strength 2-3-22  MMT R   Hip flexion 5/5   Hip abduction 5/5   Hip extension 5/5   Glut max 5-/5   Hip adduction 5/5   Knee extension  5/5   Knee flexion 5/5         Muscle Strength 12-9-21  MMT R   Hip flexion 4/5   Hip abduction 4+/5   Hip extension 5/5   Glut max 4+/5   Hip adduction 4+/5   Knee extension  3/5 at least   Knee flexion 4+/5   Ankle dorsiflexion 5/5   Ankle plantar flexion 5/5   Ankle inversion 5/5   Ankle eversion 5/5         Muscle Strength deferred R LE L 5/5 overall at IE      Endurance is N/T.     Palpation: no TTP good quad tone noted noted at IE  slight TTP medial joint line of knee  Mod quad atrophy noted    CMS Impairment/Limitation/Restriction for FOTO knee Survey     Therapist reviewed FOTO scores for Iris Blanchard   FOTO documents entered into [employer] - see Media section.     Limitation Score: 18% at IE  49%    TREATMENT    S/p Injury 19 weeks on 2-4-22    MD protocol    Iris received therapeutic exercises to develop strength, endurance, ROM, flexibility and core stabilization for 29 minutes including:  Stat bike x 5 min at L3 without brace  Quad set x 10 with emphasis on lifting ankle off mat.   SLR x 20  SAQ  X 20   Hip ext prone with bent x 2/10   Quad stretch prone with strap x 10    Neuromuscular re-education was performed to improve proprioception, strength and balance to restore functional capabilities in weight bearing for 25 minutes.      Stairs x 2 trials  holding lightly on hand rail to normalize gait to adjust for slight weakness with ascent and descent   Eccentric step down 6" x 20  Wall sits x 15 sec x 3 reps    TKE on R with blueTB in standing x 20 in no brace   One legged balance x 30 sec x 2   Mule pull 17# x all 4 directions x 5 reps x 5 steps each rep with brace     Heel raises x 10 B, single leg heel raise x 10   Minisquat with ball squeeze x 20    Squat touch to12" box + 4" step    x 20 holding green 10# kettle bell emphasis " on mechanics -       (pt deferred to home)  Cold pack with compression x 00 minutes  on mat at end of session  Instructed pt to continue icing knee as needed and at least daily with elevation when increased swelling noted    Home Exercises Provided and Patient Education Provided     Education provided:   - HEP  - stretch to point of tightness not pain  - exercise in pain free zone  - continue to ambulate with locked brace as quad control is poor  - increase frequency of ROM exercises   - increase frequency of quad strengthening      Written Home Exercises Provided: yes   Exercises were reviewed and Iris was able to demonstrate them prior to the end of the session.  Iris demonstrated good  understanding of the education provided.     See EMR under Patient Instructions for exercises provided at initial eval and 11-2-21, 11-23-21, 11-29-21, 12-16-21, 1-10-22, 1-27-22    Assessment     Patient demonstrates improvement in range of motion, strength, stabilization and function.  Patient appears independent in the prescribed HEP. Discussed pt activity level and she is not involved in sports and is performing at home full capacity though does use brace for squatting activities and activities with dogs as a precaution and should be ready for discharge after nearly fully achieving the established goals.      GOALS:   Short Term Goals:  6 weeks MET STG's  Increase range of motion to equal of opposite extremity  Increase strength 3 to 3+/5  D/C hinge brace to patella stabilization brace for gait.       Long Term Goals: 12 weeks   Maintain full knee ROM  MET  Improve muscle strength with MMT to 4+/5 to 5/5MET  Functional hop test >85% contralateral side Pt able to hop distance equal to opposite extremity, but slight LOB with landing  Restore ability to ambulate with normal pain free gait without brace MET  Walking for ADL and exercise will be restored without increased painMET  Restore ability to stand for ADL without  increased pain MET  Restore ability to perform sit to stand transfer without difficultyMET  Restore ability to climb stairs in a reciprocal manner with min to 0 increased pain and/or difficulty MET  Restore normal sleep habits without disturbances due to pain MET  Restore ability to perform ADL's and household activities independently and without increased pain(Progressing, not met)       Plan   If you concur, I recommend patient be discharged from physical therapy.   Please advise us of your findings and recommendations. Thank you for allowing us to assist in the care of your patient.       Naty Piña, PT     I certify the need for these services furnished under this plan of treatment and while under my care.    ____________________________________ Physician/Referring Practitioner                                  Date of Signature

## 2022-04-18 ENCOUNTER — LAB VISIT (OUTPATIENT)
Dept: LAB | Facility: HOSPITAL | Age: 37
End: 2022-04-18
Attending: FAMILY MEDICINE
Payer: MEDICAID

## 2022-04-18 ENCOUNTER — OFFICE VISIT (OUTPATIENT)
Dept: FAMILY MEDICINE | Facility: CLINIC | Age: 37
End: 2022-04-18
Payer: MEDICAID

## 2022-04-18 VITALS
DIASTOLIC BLOOD PRESSURE: 78 MMHG | HEART RATE: 84 BPM | BODY MASS INDEX: 27.01 KG/M2 | SYSTOLIC BLOOD PRESSURE: 110 MMHG | WEIGHT: 134 LBS | HEIGHT: 59 IN | TEMPERATURE: 98 F | OXYGEN SATURATION: 100 %

## 2022-04-18 DIAGNOSIS — Z23 NEED FOR PROPHYLACTIC VACCINATION AGAINST DIPHTHERIA AND TETANUS: ICD-10-CM

## 2022-04-18 DIAGNOSIS — Z01.89 PATIENT REQUESTED DIAGNOSTIC TESTING: ICD-10-CM

## 2022-04-18 DIAGNOSIS — F90.9 ADULT ADHD: Primary | ICD-10-CM

## 2022-04-18 PROCEDURE — 3008F BODY MASS INDEX DOCD: CPT | Mod: ,,, | Performed by: FAMILY MEDICINE

## 2022-04-18 PROCEDURE — 99213 PR OFFICE/OUTPT VISIT, EST, LEVL III, 20-29 MIN: ICD-10-PCS | Mod: S$PBB,,, | Performed by: FAMILY MEDICINE

## 2022-04-18 PROCEDURE — 99214 OFFICE O/P EST MOD 30 MIN: CPT | Performed by: FAMILY MEDICINE

## 2022-04-18 PROCEDURE — 3078F PR MOST RECENT DIASTOLIC BLOOD PRESSURE < 80 MM HG: ICD-10-PCS | Mod: ,,, | Performed by: FAMILY MEDICINE

## 2022-04-18 PROCEDURE — 81291 MTHFR GENE: CPT | Performed by: FAMILY MEDICINE

## 2022-04-18 PROCEDURE — 3008F PR BODY MASS INDEX (BMI) DOCUMENTED: ICD-10-PCS | Mod: ,,, | Performed by: FAMILY MEDICINE

## 2022-04-18 PROCEDURE — 99213 OFFICE O/P EST LOW 20 MIN: CPT | Mod: S$PBB,,, | Performed by: FAMILY MEDICINE

## 2022-04-18 PROCEDURE — 90715 TDAP VACCINE 7 YRS/> IM: CPT | Mod: PBBFAC | Performed by: FAMILY MEDICINE

## 2022-04-18 PROCEDURE — 3074F PR MOST RECENT SYSTOLIC BLOOD PRESSURE < 130 MM HG: ICD-10-PCS | Mod: ,,, | Performed by: FAMILY MEDICINE

## 2022-04-18 PROCEDURE — 3074F SYST BP LT 130 MM HG: CPT | Mod: ,,, | Performed by: FAMILY MEDICINE

## 2022-04-18 PROCEDURE — 36415 COLL VENOUS BLD VENIPUNCTURE: CPT | Performed by: FAMILY MEDICINE

## 2022-04-18 PROCEDURE — 3078F DIAST BP <80 MM HG: CPT | Mod: ,,, | Performed by: FAMILY MEDICINE

## 2022-04-18 RX ORDER — DEXTROAMPHETAMINE SACCHARATE, AMPHETAMINE ASPARTATE MONOHYDRATE, DEXTROAMPHETAMINE SULFATE AND AMPHETAMINE SULFATE 7.5; 7.5; 7.5; 7.5 MG/1; MG/1; MG/1; MG/1
60 CAPSULE, EXTENDED RELEASE ORAL EVERY MORNING
Qty: 60 CAPSULE | Refills: 0 | Status: SHIPPED | OUTPATIENT
Start: 2022-05-18 | End: 2022-09-27 | Stop reason: SDUPTHER

## 2022-04-18 RX ORDER — DEXTROAMPHETAMINE SACCHARATE, AMPHETAMINE ASPARTATE MONOHYDRATE, DEXTROAMPHETAMINE SULFATE AND AMPHETAMINE SULFATE 7.5; 7.5; 7.5; 7.5 MG/1; MG/1; MG/1; MG/1
60 CAPSULE, EXTENDED RELEASE ORAL EVERY MORNING
Qty: 60 CAPSULE | Refills: 0 | Status: SHIPPED | OUTPATIENT
Start: 2022-04-18 | End: 2022-07-18 | Stop reason: SDUPTHER

## 2022-04-18 RX ORDER — DEXTROAMPHETAMINE SACCHARATE, AMPHETAMINE ASPARTATE MONOHYDRATE, DEXTROAMPHETAMINE SULFATE AND AMPHETAMINE SULFATE 7.5; 7.5; 7.5; 7.5 MG/1; MG/1; MG/1; MG/1
60 CAPSULE, EXTENDED RELEASE ORAL EVERY MORNING
Qty: 60 CAPSULE | Refills: 0 | Status: SHIPPED | OUTPATIENT
Start: 2022-04-18 | End: 2022-10-25 | Stop reason: SDUPTHER

## 2022-04-18 NOTE — PROGRESS NOTES
"  SUBJECTIVE:    Patient ID: Iris Blanchard is a 37 y.o. female.    Chief Complaint: Medication Refill and Follow-up  35 yo female new to this provider here today for refills on her ADD/ADHD medications.The patient is here today to follow up on attention deficit hyperactivity disorder. Three months ago, and was prescribed 50 mg per day amphetamine/dextroamphetamine ER. Since that time, she says she has been tolerating the medication well without chest pain, palpitations, anorexia, weight loss, or insomnia, but she complains that since she has been on this medication since she was a teenager she does not think the medication is working any longer.      I reviewed her overdue health maintenance, patient is due for hepatitis-C screening, COVID-19 vaccine, pneumococcal vaccine, HIV screening, cervical cancer screening, tetanus vaccine     Significant past medical Hx  ADD: Adderall XR 55mg  HSV: Valtrex 500mg  Acne: Clindamycin 1%, Doxycycline 100mg  Contraception: Mirena     Specialists:  Ortho: Dr Yu  GYN: Dr Garrido  Derm: Dr Jones     Smoke: 1/2 ppd  ETOH: None  Exercise: walking the dog     HPI    ADHD Adult: On Medications  Have difficulty sustaining attention in tasks or fun activities?  no  Don't follow through on instructions and fail to finish work?  no  Have difficulty organizing tasks and activities?  no  Avoid, dislike, or are reluctant to engage in work thar requires sustained mental effort?  no  Easily distracted?  no  Forgetful in daily activities?  no  Fidget with hands or feet, or squirm in seat?  no  Have difficulty engaging in leisure activities or doing fun things quietly?  no  Feel "on the go" or "driven by a motor"?  no  Blurt out answers before questions have been completed?  no  Have difficulty waiting your turn, are impatient?  no  Interrupt or intrude on others?  no    Past Medical History:   Diagnosis Date    ADHD (attention deficit hyperactivity disorder)     Anxiety     Closed " dislocation of right patella 09/24/2021     Social History     Socioeconomic History    Marital status: Single   Occupational History    Occupation: unemployed   Tobacco Use    Smoking status: Current Every Day Smoker     Packs/day: 0.50     Types: Cigarettes    Smokeless tobacco: Never Used   Substance and Sexual Activity    Alcohol use: No    Drug use: No    Sexual activity: Yes     Partners: Female     Birth control/protection: I.U.D.     Past Surgical History:   Procedure Laterality Date    EYE SURGERY       Family History   Problem Relation Age of Onset    Hypertension Mother     Diabetes Father      Current Outpatient Medications   Medication Sig Dispense Refill    cetirizine (ZYRTEC) 10 MG tablet Take 1 tablet (10 mg total) by mouth once daily. 30 tablet 5    clindamycin (CLEOCIN T) 1 % external solution APPLY TO SKIN BID  5    clobetasoL (TEMOVATE) 0.05 % external solution USE TWICE DAILY AS NEEDED      dextroamphetamine-amphetamine (ADDERALL XR) 30 MG 24 hr capsule Take 1 capsule (30 mg total) by mouth once daily. Take with 25mg xr. Brand Name medically necessary. 30 capsule 0    dextroamphetamine-amphetamine (ADDERALL XR) 30 MG 24 hr capsule Take 1 capsule (30 mg total) by mouth once daily. Take with 25mg xr. Brand Name medically necessary. 30 capsule 0    doxycycline (VIBRAMYCIN) 100 MG Cap Take 100 mg by mouth 2 (two) times daily.      naproxen (NAPROSYN) 500 MG tablet Take 1 tablet (500 mg total) by mouth after meals as needed (headache or pain). 60 tablet 3    valACYclovir (VALTREX) 500 MG tablet TK 1 T PO QD  10    dextroamphetamine-amphetamine (ADDERALL XR) 30 MG 24 hr capsule Take 2 capsules (60 mg total) by mouth every morning. 60 capsule 0    [START ON 5/18/2022] dextroamphetamine-amphetamine (ADDERALL XR) 30 MG 24 hr capsule Take 2 capsules (60 mg total) by mouth every morning. 60 capsule 0    dextroamphetamine-amphetamine (ADDERALL XR) 30 MG 24 hr capsule Take 2 capsules (60  "mg total) by mouth every morning. 60 capsule 0    triamcinolone acetonide 0.1% (KENALOG) 0.1 % cream APPLY TOPICALLY TO THE AFFECTED AREA TWICE DAILY AS NEEDED       No current facility-administered medications for this visit.     Review of patient's allergies indicates:  No Known Allergies    Review of Systems   Constitutional: Negative for activity change, appetite change, fatigue and unexpected weight change.   HENT: Negative for congestion, ear pain, hearing loss, postnasal drip, sinus pressure, sinus pain, sneezing and sore throat.    Eyes: Negative for photophobia and pain.   Respiratory: Negative for cough, chest tightness, shortness of breath and wheezing.    Cardiovascular: Negative for chest pain, palpitations and leg swelling.   Gastrointestinal: Negative for abdominal distention, abdominal pain, blood in stool, constipation, diarrhea, nausea and vomiting.   Endocrine: Negative for cold intolerance, heat intolerance, polydipsia and polyuria.   Genitourinary: Negative for difficulty urinating, dysuria, flank pain, frequency, hematuria, pelvic pain and urgency.   Musculoskeletal: Negative for back pain, joint swelling, myalgias and neck pain.   Skin: Negative for pallor.   Allergic/Immunologic: Negative for environmental allergies and food allergies.   Neurological: Negative for dizziness, weakness, light-headedness and numbness.   Hematological: Does not bruise/bleed easily.   Psychiatric/Behavioral: Negative for agitation, confusion, decreased concentration and sleep disturbance. The patient is not nervous/anxious.           Blood pressure 110/78, pulse 84, temperature 97.8 °F (36.6 °C), temperature source Oral, height 4' 11" (1.499 m), weight 60.8 kg (134 lb), SpO2 100 %. Body mass index is 27.06 kg/m².   Objective:      Physical Exam  Vitals reviewed.   Constitutional:       General: She is not in acute distress.     Appearance: Normal appearance. She is well-developed. She is not ill-appearing or " toxic-appearing.   HENT:      Head: Normocephalic and atraumatic.      Right Ear: External ear normal.      Left Ear: External ear normal.   Eyes:      General:         Right eye: No discharge.         Left eye: No discharge.      Conjunctiva/sclera: Conjunctivae normal.      Pupils: Pupils are equal, round, and reactive to light.   Cardiovascular:      Rate and Rhythm: Normal rate and regular rhythm.      Heart sounds: Normal heart sounds. No murmur heard.  Pulmonary:      Effort: Pulmonary effort is normal. No respiratory distress.      Breath sounds: Normal breath sounds.   Skin:     General: Skin is warm and dry.      Capillary Refill: Capillary refill takes less than 2 seconds.   Neurological:      Mental Status: She is alert.             Assessment:       1. Adult ADHD    2. Patient requested diagnostic testing    3. Need for prophylactic vaccination against diphtheria and tetanus         Plan:           Adult ADHD  -     dextroamphetamine-amphetamine (ADDERALL XR) 30 MG 24 hr capsule; Take 2 capsules (60 mg total) by mouth every morning.  Dispense: 60 capsule; Refill: 0  -     dextroamphetamine-amphetamine (ADDERALL XR) 30 MG 24 hr capsule; Take 2 capsules (60 mg total) by mouth every morning.  Dispense: 60 capsule; Refill: 0  -     dextroamphetamine-amphetamine (ADDERALL XR) 30 MG 24 hr capsule; Take 2 capsules (60 mg total) by mouth every morning.  Dispense: 60 capsule; Refill: 0    Patient requested diagnostic testing  -     MTHFR Thermolabile Variant, DNA Analysis; Future; Expected date: 04/18/2022  Patient states that her son is recently had testing completed and she states that he has a MTHFR gene mutation and she would like to get screened for the same.    Need for prophylactic vaccination against diphtheria and tetanus  -     (In Office Administered) Tdap Vaccine

## 2022-04-23 LAB — MTHFR GENE MUT ANL BLD/T: NORMAL

## 2022-05-12 ENCOUNTER — PATIENT MESSAGE (OUTPATIENT)
Dept: FAMILY MEDICINE | Facility: CLINIC | Age: 37
End: 2022-05-12

## 2022-07-18 DIAGNOSIS — F90.9 ADULT ADHD: ICD-10-CM

## 2022-07-18 RX ORDER — DEXTROAMPHETAMINE SACCHARATE, AMPHETAMINE ASPARTATE MONOHYDRATE, DEXTROAMPHETAMINE SULFATE AND AMPHETAMINE SULFATE 7.5; 7.5; 7.5; 7.5 MG/1; MG/1; MG/1; MG/1
60 CAPSULE, EXTENDED RELEASE ORAL EVERY MORNING
Qty: 60 CAPSULE | Refills: 0 | Status: SHIPPED | OUTPATIENT
Start: 2022-07-18 | End: 2022-08-24 | Stop reason: SDUPTHER

## 2022-07-18 NOTE — TELEPHONE ENCOUNTER
Patient called and left a message stating she has a dog about to deliver puppies and she cannot leave her. She has an appointment today @ 10:40 am. She wants to know if you can call her medication in for 1 month so she can reschedule next month which will enable her to stay with the dog. I spoke with the patient telling her you were in clinic and it is hard to catch you but I could send you the message. Patient stated that was fine because she just couldn't leave the dog. Patient expressed verbal understanding it could take a few days if it is approved.

## 2022-08-24 DIAGNOSIS — F90.9 ADULT ADHD: ICD-10-CM

## 2022-08-25 RX ORDER — DEXTROAMPHETAMINE SACCHARATE, AMPHETAMINE ASPARTATE MONOHYDRATE, DEXTROAMPHETAMINE SULFATE AND AMPHETAMINE SULFATE 7.5; 7.5; 7.5; 7.5 MG/1; MG/1; MG/1; MG/1
60 CAPSULE, EXTENDED RELEASE ORAL EVERY MORNING
Qty: 60 CAPSULE | Refills: 0 | Status: SHIPPED | OUTPATIENT
Start: 2022-08-25 | End: 2022-10-25 | Stop reason: SDUPTHER

## 2022-09-27 DIAGNOSIS — F90.9 ADULT ADHD: ICD-10-CM

## 2022-09-28 ENCOUNTER — PATIENT MESSAGE (OUTPATIENT)
Dept: FAMILY MEDICINE | Facility: CLINIC | Age: 37
End: 2022-09-28

## 2022-09-28 DIAGNOSIS — F90.9 ADULT ADHD: ICD-10-CM

## 2022-09-28 RX ORDER — DEXTROAMPHETAMINE SACCHARATE, AMPHETAMINE ASPARTATE MONOHYDRATE, DEXTROAMPHETAMINE SULFATE AND AMPHETAMINE SULFATE 7.5; 7.5; 7.5; 7.5 MG/1; MG/1; MG/1; MG/1
60 CAPSULE, EXTENDED RELEASE ORAL EVERY MORNING
Qty: 60 CAPSULE | Refills: 0 | Status: CANCELLED | OUTPATIENT
Start: 2022-09-28

## 2022-09-29 RX ORDER — DEXTROAMPHETAMINE SACCHARATE, AMPHETAMINE ASPARTATE MONOHYDRATE, DEXTROAMPHETAMINE SULFATE AND AMPHETAMINE SULFATE 7.5; 7.5; 7.5; 7.5 MG/1; MG/1; MG/1; MG/1
60 CAPSULE, EXTENDED RELEASE ORAL EVERY MORNING
Qty: 60 CAPSULE | Refills: 0 | Status: SHIPPED | OUTPATIENT
Start: 2022-09-29 | End: 2022-10-25 | Stop reason: SDUPTHER

## 2022-10-25 ENCOUNTER — OFFICE VISIT (OUTPATIENT)
Dept: FAMILY MEDICINE | Facility: CLINIC | Age: 37
End: 2022-10-25
Payer: MEDICAID

## 2022-10-25 VITALS
HEART RATE: 97 BPM | BODY MASS INDEX: 27.04 KG/M2 | WEIGHT: 134.13 LBS | SYSTOLIC BLOOD PRESSURE: 114 MMHG | HEIGHT: 59 IN | DIASTOLIC BLOOD PRESSURE: 70 MMHG | OXYGEN SATURATION: 99 % | TEMPERATURE: 98 F

## 2022-10-25 DIAGNOSIS — F90.9 ADULT ADHD: ICD-10-CM

## 2022-10-25 DIAGNOSIS — Z23 NEEDS FLU SHOT: Primary | ICD-10-CM

## 2022-10-25 PROCEDURE — 3078F PR MOST RECENT DIASTOLIC BLOOD PRESSURE < 80 MM HG: ICD-10-PCS | Mod: CPTII,,, | Performed by: FAMILY MEDICINE

## 2022-10-25 PROCEDURE — 90471 IMMUNIZATION ADMIN: CPT | Mod: PBBFAC | Performed by: FAMILY MEDICINE

## 2022-10-25 PROCEDURE — 3074F PR MOST RECENT SYSTOLIC BLOOD PRESSURE < 130 MM HG: ICD-10-PCS | Mod: CPTII,,, | Performed by: FAMILY MEDICINE

## 2022-10-25 PROCEDURE — 1159F MED LIST DOCD IN RCRD: CPT | Mod: CPTII,,, | Performed by: FAMILY MEDICINE

## 2022-10-25 PROCEDURE — 3074F SYST BP LT 130 MM HG: CPT | Mod: CPTII,,, | Performed by: FAMILY MEDICINE

## 2022-10-25 PROCEDURE — 99213 OFFICE O/P EST LOW 20 MIN: CPT | Mod: S$PBB,,, | Performed by: FAMILY MEDICINE

## 2022-10-25 PROCEDURE — 3078F DIAST BP <80 MM HG: CPT | Mod: CPTII,,, | Performed by: FAMILY MEDICINE

## 2022-10-25 PROCEDURE — 99213 PR OFFICE/OUTPT VISIT, EST, LEVL III, 20-29 MIN: ICD-10-PCS | Mod: S$PBB,,, | Performed by: FAMILY MEDICINE

## 2022-10-25 PROCEDURE — 99214 OFFICE O/P EST MOD 30 MIN: CPT | Performed by: FAMILY MEDICINE

## 2022-10-25 PROCEDURE — 1159F PR MEDICATION LIST DOCUMENTED IN MEDICAL RECORD: ICD-10-PCS | Mod: CPTII,,, | Performed by: FAMILY MEDICINE

## 2022-10-25 RX ORDER — DEXTROAMPHETAMINE SACCHARATE, AMPHETAMINE ASPARTATE MONOHYDRATE, DEXTROAMPHETAMINE SULFATE AND AMPHETAMINE SULFATE 7.5; 7.5; 7.5; 7.5 MG/1; MG/1; MG/1; MG/1
60 CAPSULE, EXTENDED RELEASE ORAL EVERY MORNING
Qty: 60 CAPSULE | Refills: 0 | Status: SHIPPED | OUTPATIENT
Start: 2022-11-29 | End: 2022-12-01 | Stop reason: SDUPTHER

## 2022-10-25 RX ORDER — DEXTROAMPHETAMINE SACCHARATE, AMPHETAMINE ASPARTATE MONOHYDRATE, DEXTROAMPHETAMINE SULFATE AND AMPHETAMINE SULFATE 7.5; 7.5; 7.5; 7.5 MG/1; MG/1; MG/1; MG/1
60 CAPSULE, EXTENDED RELEASE ORAL EVERY MORNING
Qty: 60 CAPSULE | Refills: 0 | Status: SHIPPED | OUTPATIENT
Start: 2022-10-29 | End: 2022-12-01 | Stop reason: SDUPTHER

## 2022-10-25 RX ORDER — DEXTROAMPHETAMINE SACCHARATE, AMPHETAMINE ASPARTATE MONOHYDRATE, DEXTROAMPHETAMINE SULFATE AND AMPHETAMINE SULFATE 7.5; 7.5; 7.5; 7.5 MG/1; MG/1; MG/1; MG/1
60 CAPSULE, EXTENDED RELEASE ORAL EVERY MORNING
Qty: 60 CAPSULE | Refills: 0 | Status: SHIPPED | OUTPATIENT
Start: 2022-12-29 | End: 2022-12-01 | Stop reason: SDUPTHER

## 2022-10-25 NOTE — PROGRESS NOTES
"  SUBJECTIVE:    Patient ID: Iris Blanchard is a 37 y.o. female.    Chief Complaint: Medication Refill  36 yo female new to this provider here today for refills on her ADD/ADHD medications.The patient is here today to follow up on attention deficit hyperactivity disorder. Three months ago, and was prescribed 60 mg per day amphetamine/dextroamphetamine ER. Since that time, she says she has been tolerating the medication well without chest pain, palpitations, anorexia, weight loss, or insomnia.     I reviewed her overdue health maintenance, patient is due for hepatitis-C screening, COVID-19 vaccine, pneumococcal vaccine, HIV screening, cervical cancer screening.     Significant past medical Hx  ADD: Adderall XR 55mg  HSV: Valtrex 500mg  Acne: Clindamycin 1%, Doxycycline 100mg  Contraception: Mirena     Specialists:  Ortho: Dr Yu  GYN: Dr Garrido  Derm: Dr Jones     Smoke: 1/2 ppd  ETOH: None  Exercise: walking the dog      HPI    ADHD Adult: On Medications  Have difficulty sustaining attention in tasks or fun activities?  no  Don't follow through on instructions and fail to finish work?  no  Have difficulty organizing tasks and activities?  no  Avoid, dislike, or are reluctant to engage in work thar requires sustained mental effort?  no  Easily distracted?  no  Forgetful in daily activities?  no  Fidget with hands or feet, or squirm in seat?  no  Have difficulty engaging in leisure activities or doing fun things quietly?  no  Feel "on the go" or "driven by a motor"?  no  Blurt out answers before questions have been completed?  no  Have difficulty waiting your turn, are impatient?  no  Interrupt or intrude on others?  no        Past Medical History:   Diagnosis Date    ADHD (attention deficit hyperactivity disorder)     Anxiety     Closed dislocation of right patella 09/24/2021     Social History     Socioeconomic History    Marital status: Single   Occupational History    Occupation: unemployed   Tobacco Use "    Smoking status: Every Day     Packs/day: 0.50     Types: Cigarettes    Smokeless tobacco: Never   Substance and Sexual Activity    Alcohol use: No    Drug use: No    Sexual activity: Yes     Partners: Female     Birth control/protection: I.U.D.     Past Surgical History:   Procedure Laterality Date    EYE SURGERY       Family History   Problem Relation Age of Onset    Hypertension Mother     Diabetes Father      Current Outpatient Medications   Medication Sig Dispense Refill    cetirizine (ZYRTEC) 10 MG tablet Take 1 tablet (10 mg total) by mouth once daily. 30 tablet 5    clindamycin (CLEOCIN T) 1 % external solution APPLY TO SKIN BID  5    clobetasoL (TEMOVATE) 0.05 % external solution USE TWICE DAILY AS NEEDED      naproxen (NAPROSYN) 500 MG tablet Take 1 tablet (500 mg total) by mouth after meals as needed (headache or pain). 60 tablet 3    valACYclovir (VALTREX) 500 MG tablet TK 1 T PO QD  10    [START ON 10/29/2022] dextroamphetamine-amphetamine (ADDERALL XR) 30 MG 24 hr capsule Take 2 capsules (60 mg total) by mouth every morning. 60 capsule 0    [START ON 11/29/2022] dextroamphetamine-amphetamine (ADDERALL XR) 30 MG 24 hr capsule Take 2 capsules (60 mg total) by mouth every morning. 60 capsule 0    [START ON 12/29/2022] dextroamphetamine-amphetamine (ADDERALL XR) 30 MG 24 hr capsule Take 2 capsules (60 mg total) by mouth every morning. 60 capsule 0     No current facility-administered medications for this visit.     Review of patient's allergies indicates:  No Known Allergies    Review of Systems   Constitutional:  Negative for activity change, appetite change, fatigue and unexpected weight change.   HENT:  Negative for congestion, ear pain, hearing loss, postnasal drip, sinus pressure, sinus pain, sneezing and sore throat.    Eyes:  Negative for photophobia and pain.   Respiratory:  Negative for cough, chest tightness, shortness of breath and wheezing.    Cardiovascular:  Negative for chest pain,  "palpitations and leg swelling.   Gastrointestinal:  Negative for abdominal distention, abdominal pain, blood in stool, constipation, diarrhea, nausea and vomiting.   Endocrine: Negative for cold intolerance, heat intolerance, polydipsia and polyuria.   Genitourinary:  Negative for difficulty urinating, dysuria, flank pain, frequency, hematuria, pelvic pain and urgency.   Musculoskeletal:  Negative for back pain, joint swelling, myalgias and neck pain.   Skin:  Negative for pallor.   Allergic/Immunologic: Negative for environmental allergies and food allergies.   Neurological:  Negative for dizziness, weakness, light-headedness and numbness.   Hematological:  Does not bruise/bleed easily.   Psychiatric/Behavioral:  Negative for agitation, confusion, decreased concentration and sleep disturbance. The patient is not nervous/anxious.         Blood pressure 114/70, pulse 97, temperature 97.8 °F (36.6 °C), temperature source Oral, height 4' 11" (1.499 m), weight 60.8 kg (134 lb 1.6 oz), SpO2 99 %. Body mass index is 27.08 kg/m².   Objective:      Physical Exam  Vitals reviewed.   Constitutional:       General: She is not in acute distress.     Appearance: Normal appearance. She is well-developed. She is not ill-appearing or toxic-appearing.   HENT:      Head: Normocephalic and atraumatic.      Right Ear: External ear normal.      Left Ear: External ear normal.   Eyes:      General:         Right eye: No discharge.         Left eye: No discharge.      Conjunctiva/sclera: Conjunctivae normal.      Pupils: Pupils are equal, round, and reactive to light.   Cardiovascular:      Rate and Rhythm: Normal rate and regular rhythm.      Heart sounds: Normal heart sounds. No murmur heard.  Pulmonary:      Effort: Pulmonary effort is normal. No respiratory distress.      Breath sounds: Normal breath sounds.   Skin:     General: Skin is warm and dry.      Capillary Refill: Capillary refill takes less than 2 seconds.   Neurological:     "  Mental Status: She is alert.           Assessment:       1. Needs flu shot    2. Adult ADHD         Plan:           Needs flu shot  -     Influenza - Quadrivalent *Preferred* (6 months+) (PF)    Adult ADHD  -     dextroamphetamine-amphetamine (ADDERALL XR) 30 MG 24 hr capsule; Take 2 capsules (60 mg total) by mouth every morning.  Dispense: 60 capsule; Refill: 0  -     dextroamphetamine-amphetamine (ADDERALL XR) 30 MG 24 hr capsule; Take 2 capsules (60 mg total) by mouth every morning.  Dispense: 60 capsule; Refill: 0  -     dextroamphetamine-amphetamine (ADDERALL XR) 30 MG 24 hr capsule; Take 2 capsules (60 mg total) by mouth every morning.  Dispense: 60 capsule; Refill: 0

## 2022-12-01 ENCOUNTER — TELEPHONE (OUTPATIENT)
Dept: FAMILY MEDICINE | Facility: CLINIC | Age: 37
End: 2022-12-01

## 2022-12-01 DIAGNOSIS — F90.9 ADULT ADHD: ICD-10-CM

## 2022-12-01 RX ORDER — DEXTROAMPHETAMINE SACCHARATE, AMPHETAMINE ASPARTATE MONOHYDRATE, DEXTROAMPHETAMINE SULFATE AND AMPHETAMINE SULFATE 7.5; 7.5; 7.5; 7.5 MG/1; MG/1; MG/1; MG/1
60 CAPSULE, EXTENDED RELEASE ORAL EVERY MORNING
Qty: 60 CAPSULE | Refills: 0 | Status: SHIPPED | OUTPATIENT
Start: 2022-12-01 | End: 2022-12-07 | Stop reason: SDUPTHER

## 2022-12-01 RX ORDER — DEXTROAMPHETAMINE SACCHARATE, AMPHETAMINE ASPARTATE MONOHYDRATE, DEXTROAMPHETAMINE SULFATE AND AMPHETAMINE SULFATE 7.5; 7.5; 7.5; 7.5 MG/1; MG/1; MG/1; MG/1
60 CAPSULE, EXTENDED RELEASE ORAL EVERY MORNING
Qty: 60 CAPSULE | Refills: 0 | Status: SHIPPED | OUTPATIENT
Start: 2023-02-01 | End: 2022-12-07 | Stop reason: SDUPTHER

## 2022-12-01 RX ORDER — DEXTROAMPHETAMINE SACCHARATE, AMPHETAMINE ASPARTATE MONOHYDRATE, DEXTROAMPHETAMINE SULFATE AND AMPHETAMINE SULFATE 7.5; 7.5; 7.5; 7.5 MG/1; MG/1; MG/1; MG/1
60 CAPSULE, EXTENDED RELEASE ORAL EVERY MORNING
Qty: 60 CAPSULE | Refills: 0 | Status: SHIPPED | OUTPATIENT
Start: 2023-01-01 | End: 2022-12-07 | Stop reason: SDUPTHER

## 2022-12-01 NOTE — TELEPHONE ENCOUNTER
Patient called stating that she forgot to remind you the she gets the name brand Aderall XR.  It was not marked CHON on the last refill.

## 2022-12-06 ENCOUNTER — TELEPHONE (OUTPATIENT)
Dept: FAMILY MEDICINE | Facility: CLINIC | Age: 37
End: 2022-12-06

## 2022-12-06 DIAGNOSIS — F90.9 ADULT ADHD: ICD-10-CM

## 2022-12-06 NOTE — TELEPHONE ENCOUNTER
Lydia from [facility] stated that the patient's PA was canceled for her adderall because they do not cover the brand name.She stated that you needed to send prescription for 60 mg instead of 30 mg of the generic.

## 2022-12-07 RX ORDER — DEXTROAMPHETAMINE SACCHARATE, AMPHETAMINE ASPARTATE MONOHYDRATE, DEXTROAMPHETAMINE SULFATE AND AMPHETAMINE SULFATE 7.5; 7.5; 7.5; 7.5 MG/1; MG/1; MG/1; MG/1
60 CAPSULE, EXTENDED RELEASE ORAL EVERY MORNING
Qty: 60 CAPSULE | Refills: 0 | Status: SHIPPED | OUTPATIENT
Start: 2023-02-07 | End: 2023-01-23 | Stop reason: SDUPTHER

## 2022-12-07 RX ORDER — DEXTROAMPHETAMINE SACCHARATE, AMPHETAMINE ASPARTATE MONOHYDRATE, DEXTROAMPHETAMINE SULFATE AND AMPHETAMINE SULFATE 7.5; 7.5; 7.5; 7.5 MG/1; MG/1; MG/1; MG/1
60 CAPSULE, EXTENDED RELEASE ORAL EVERY MORNING
Qty: 60 CAPSULE | Refills: 0 | Status: SHIPPED | OUTPATIENT
Start: 2022-12-07 | End: 2023-01-23 | Stop reason: SDUPTHER

## 2022-12-07 RX ORDER — DEXTROAMPHETAMINE SACCHARATE, AMPHETAMINE ASPARTATE MONOHYDRATE, DEXTROAMPHETAMINE SULFATE AND AMPHETAMINE SULFATE 7.5; 7.5; 7.5; 7.5 MG/1; MG/1; MG/1; MG/1
60 CAPSULE, EXTENDED RELEASE ORAL EVERY MORNING
Qty: 60 CAPSULE | Refills: 0 | Status: SHIPPED | OUTPATIENT
Start: 2023-01-07 | End: 2023-01-23 | Stop reason: SDUPTHER

## 2022-12-07 NOTE — TELEPHONE ENCOUNTER
Insurance company did not leave a call back number. The number they called from is an out going number only. They are wanting the patient to take 1 pil daily instead of 2, that is where the 60 comes in. This is not the first time we had an insurance company want the dosage changed.

## 2022-12-08 ENCOUNTER — PATIENT MESSAGE (OUTPATIENT)
Dept: FAMILY MEDICINE | Facility: CLINIC | Age: 37
End: 2022-12-08

## 2022-12-08 ENCOUNTER — TELEPHONE (OUTPATIENT)
Dept: FAMILY MEDICINE | Facility: CLINIC | Age: 37
End: 2022-12-08

## 2022-12-08 NOTE — TELEPHONE ENCOUNTER
Britt this is what is ordered 60 mg a day, unfortunately it only comes in 30mg tablets so it has to be 2 tablets a day.

## 2023-01-23 ENCOUNTER — OFFICE VISIT (OUTPATIENT)
Dept: FAMILY MEDICINE | Facility: CLINIC | Age: 38
End: 2023-01-23
Payer: MEDICAID

## 2023-01-23 ENCOUNTER — PATIENT MESSAGE (OUTPATIENT)
Dept: FAMILY MEDICINE | Facility: CLINIC | Age: 38
End: 2023-01-23

## 2023-01-23 VITALS
HEART RATE: 87 BPM | HEIGHT: 59 IN | OXYGEN SATURATION: 100 % | SYSTOLIC BLOOD PRESSURE: 123 MMHG | BODY MASS INDEX: 27.92 KG/M2 | WEIGHT: 138.5 LBS | DIASTOLIC BLOOD PRESSURE: 86 MMHG

## 2023-01-23 DIAGNOSIS — F90.9 ADULT ADHD: ICD-10-CM

## 2023-01-23 DIAGNOSIS — J30.2 SEASONAL ALLERGIES: ICD-10-CM

## 2023-01-23 PROCEDURE — 1159F MED LIST DOCD IN RCRD: CPT | Mod: CPTII,,, | Performed by: FAMILY MEDICINE

## 2023-01-23 PROCEDURE — 3008F BODY MASS INDEX DOCD: CPT | Mod: CPTII,,, | Performed by: FAMILY MEDICINE

## 2023-01-23 PROCEDURE — 3074F SYST BP LT 130 MM HG: CPT | Mod: CPTII,,, | Performed by: FAMILY MEDICINE

## 2023-01-23 PROCEDURE — 99213 OFFICE O/P EST LOW 20 MIN: CPT | Mod: S$PBB,,, | Performed by: FAMILY MEDICINE

## 2023-01-23 PROCEDURE — 99213 PR OFFICE/OUTPT VISIT, EST, LEVL III, 20-29 MIN: ICD-10-PCS | Mod: S$PBB,,, | Performed by: FAMILY MEDICINE

## 2023-01-23 PROCEDURE — 99214 OFFICE O/P EST MOD 30 MIN: CPT | Performed by: FAMILY MEDICINE

## 2023-01-23 PROCEDURE — 3079F PR MOST RECENT DIASTOLIC BLOOD PRESSURE 80-89 MM HG: ICD-10-PCS | Mod: CPTII,,, | Performed by: FAMILY MEDICINE

## 2023-01-23 PROCEDURE — 3074F PR MOST RECENT SYSTOLIC BLOOD PRESSURE < 130 MM HG: ICD-10-PCS | Mod: CPTII,,, | Performed by: FAMILY MEDICINE

## 2023-01-23 PROCEDURE — 3079F DIAST BP 80-89 MM HG: CPT | Mod: CPTII,,, | Performed by: FAMILY MEDICINE

## 2023-01-23 PROCEDURE — 3008F PR BODY MASS INDEX (BMI) DOCUMENTED: ICD-10-PCS | Mod: CPTII,,, | Performed by: FAMILY MEDICINE

## 2023-01-23 PROCEDURE — 1159F PR MEDICATION LIST DOCUMENTED IN MEDICAL RECORD: ICD-10-PCS | Mod: CPTII,,, | Performed by: FAMILY MEDICINE

## 2023-01-23 RX ORDER — DEXTROAMPHETAMINE SACCHARATE, AMPHETAMINE ASPARTATE MONOHYDRATE, DEXTROAMPHETAMINE SULFATE AND AMPHETAMINE SULFATE 7.5; 7.5; 7.5; 7.5 MG/1; MG/1; MG/1; MG/1
60 CAPSULE, EXTENDED RELEASE ORAL EVERY MORNING
Qty: 60 CAPSULE | Refills: 0 | Status: SHIPPED | OUTPATIENT
Start: 2023-02-23 | End: 2023-03-21

## 2023-01-23 RX ORDER — DEXTROAMPHETAMINE SACCHARATE, AMPHETAMINE ASPARTATE MONOHYDRATE, DEXTROAMPHETAMINE SULFATE AND AMPHETAMINE SULFATE 7.5; 7.5; 7.5; 7.5 MG/1; MG/1; MG/1; MG/1
60 CAPSULE, EXTENDED RELEASE ORAL EVERY MORNING
Qty: 60 CAPSULE | Refills: 0 | Status: SHIPPED | OUTPATIENT
Start: 2023-01-23 | End: 2023-01-23 | Stop reason: SDUPTHER

## 2023-01-23 RX ORDER — CETIRIZINE HYDROCHLORIDE 10 MG/1
10 TABLET ORAL DAILY
Qty: 90 TABLET | Refills: 1 | Status: SHIPPED | OUTPATIENT
Start: 2023-01-23

## 2023-01-23 RX ORDER — DEXTROAMPHETAMINE SACCHARATE, AMPHETAMINE ASPARTATE MONOHYDRATE, DEXTROAMPHETAMINE SULFATE AND AMPHETAMINE SULFATE 7.5; 7.5; 7.5; 7.5 MG/1; MG/1; MG/1; MG/1
60 CAPSULE, EXTENDED RELEASE ORAL EVERY MORNING
Qty: 60 CAPSULE | Refills: 0 | Status: SHIPPED | OUTPATIENT
Start: 2023-03-07 | End: 2023-03-21

## 2023-01-23 NOTE — PROGRESS NOTES
"  SUBJECTIVE:    Patient ID: Iris Blanchard is a 37 y.o. female.    Chief Complaint: Medication Refill  38 yo female new to this provider here today for refills on her ADD/ADHD medications.The patient is here today to follow up on attention deficit hyperactivity disorder. Three months ago, and was prescribed 60 mg per day amphetamine/dextroamphetamine ER. Since that time, she says she has been tolerating the medication well without chest pain, palpitations, anorexia, weight loss, or insomnia.     I reviewed her overdue health maintenance, patient is due for hepatitis-C screening, COVID-19 vaccine, pneumococcal vaccine, HIV screening, cervical cancer screening.     Significant past medical Hx  ADD: Adderall XR 55mg  HSV: Valtrex 500mg  Acne: Clindamycin 1%, Doxycycline 100mg  Contraception: Mirena     Specialists:  Ortho: Dr Yu  GYN: Dr Garrido  Derm: Dr Jones     Smoke: 1/2 ppd  ETOH: None  Exercise: walking the dog    HPI    ADHD Adult: On Medications  Have difficulty sustaining attention in tasks or fun activities?  no  Don't follow through on instructions and fail to finish work?  no  Have difficulty organizing tasks and activities?  no  Avoid, dislike, or are reluctant to engage in work thar requires sustained mental effort?  no  Easily distracted?  no  Forgetful in daily activities?  no  Fidget with hands or feet, or squirm in seat?  no  Have difficulty engaging in leisure activities or doing fun things quietly?  no  Feel "on the go" or "driven by a motor"?  no  Blurt out answers before questions have been completed?  no  Have difficulty waiting your turn, are impatient?  no  Interrupt or intrude on others?  no    Past Medical History:   Diagnosis Date    ADHD (attention deficit hyperactivity disorder)     Anxiety     Closed dislocation of right patella 09/24/2021     Social History     Socioeconomic History    Marital status: Single   Occupational History    Occupation: unemployed   Tobacco Use    " Smoking status: Every Day     Packs/day: 0.50     Types: Cigarettes    Smokeless tobacco: Never   Substance and Sexual Activity    Alcohol use: No    Drug use: No    Sexual activity: Yes     Partners: Female     Birth control/protection: I.U.D.     Past Surgical History:   Procedure Laterality Date    EYE SURGERY       Family History   Problem Relation Age of Onset    Hypertension Mother     Diabetes Father      Current Outpatient Medications   Medication Sig Dispense Refill    cetirizine (ZYRTEC) 10 MG tablet Take 1 tablet (10 mg total) by mouth once daily. 90 tablet 1    clindamycin (CLEOCIN T) 1 % external solution APPLY TO SKIN BID  5    clobetasoL (TEMOVATE) 0.05 % external solution USE TWICE DAILY AS NEEDED      naproxen (NAPROSYN) 500 MG tablet Take 1 tablet (500 mg total) by mouth after meals as needed (headache or pain). 60 tablet 3    valACYclovir (VALTREX) 500 MG tablet TK 1 T PO QD  10    dextroamphetamine-amphetamine (ADDERALL XR) 30 MG 24 hr capsule Take 2 capsules (60 mg total) by mouth every morning. 60 capsule 0    [START ON 2/23/2023] dextroamphetamine-amphetamine (ADDERALL XR) 30 MG 24 hr capsule Take 2 capsules (60 mg total) by mouth every morning. 60 capsule 0    [START ON 3/7/2023] dextroamphetamine-amphetamine (ADDERALL XR) 30 MG 24 hr capsule Take 2 capsules (60 mg total) by mouth every morning. 60 capsule 0     No current facility-administered medications for this visit.     Review of patient's allergies indicates:  No Known Allergies    Review of Systems   Constitutional:  Negative for activity change, appetite change, fatigue and unexpected weight change.   HENT:  Negative for congestion, ear pain, hearing loss, postnasal drip, sinus pressure, sinus pain, sneezing and sore throat.    Eyes:  Negative for photophobia and pain.   Respiratory:  Negative for cough, chest tightness, shortness of breath and wheezing.    Cardiovascular:  Negative for chest pain, palpitations and leg swelling.  "  Gastrointestinal:  Negative for abdominal distention, abdominal pain, blood in stool, constipation, diarrhea, nausea and vomiting.   Endocrine: Negative for cold intolerance, heat intolerance, polydipsia and polyuria.   Genitourinary:  Negative for difficulty urinating, dysuria, flank pain, frequency, hematuria, pelvic pain and urgency.   Musculoskeletal:  Negative for back pain, joint swelling, myalgias and neck pain.   Skin:  Negative for pallor.   Allergic/Immunologic: Negative for environmental allergies and food allergies.   Neurological:  Negative for dizziness, weakness, light-headedness and numbness.   Hematological:  Does not bruise/bleed easily.   Psychiatric/Behavioral:  Negative for agitation, confusion, decreased concentration and sleep disturbance. The patient is not nervous/anxious.         Blood pressure 123/86, pulse 87, height 4' 11" (1.499 m), weight 62.8 kg (138 lb 8 oz), SpO2 100 %. Body mass index is 27.97 kg/m².   Objective:      Physical Exam  Vitals reviewed.   Constitutional:       General: She is not in acute distress.     Appearance: Normal appearance. She is well-developed. She is not ill-appearing or toxic-appearing.   HENT:      Head: Normocephalic and atraumatic.      Right Ear: External ear normal.      Left Ear: External ear normal.   Eyes:      General:         Right eye: No discharge.         Left eye: No discharge.      Conjunctiva/sclera: Conjunctivae normal.      Pupils: Pupils are equal, round, and reactive to light.   Cardiovascular:      Rate and Rhythm: Normal rate and regular rhythm.      Heart sounds: Normal heart sounds. No murmur heard.  Pulmonary:      Effort: Pulmonary effort is normal. No respiratory distress.      Breath sounds: Normal breath sounds.   Skin:     General: Skin is warm and dry.      Capillary Refill: Capillary refill takes less than 2 seconds.   Neurological:      Mental Status: She is alert.           Assessment:       1. Adult ADHD    2. Seasonal " allergies         Plan:           Adult ADHD  -     dextroamphetamine-amphetamine (ADDERALL XR) 30 MG 24 hr capsule; Take 2 capsules (60 mg total) by mouth every morning.  Dispense: 60 capsule; Refill: 0  -     dextroamphetamine-amphetamine (ADDERALL XR) 30 MG 24 hr capsule; Take 2 capsules (60 mg total) by mouth every morning.  Dispense: 60 capsule; Refill: 0  -     dextroamphetamine-amphetamine (ADDERALL XR) 30 MG 24 hr capsule; Take 2 capsules (60 mg total) by mouth every morning.  Dispense: 60 capsule; Refill: 0    Seasonal allergies  -     cetirizine (ZYRTEC) 10 MG tablet; Take 1 tablet (10 mg total) by mouth once daily.  Dispense: 90 tablet; Refill: 1

## 2023-02-12 RX ORDER — DEXTROAMPHETAMINE SACCHARATE, AMPHETAMINE ASPARTATE MONOHYDRATE, DEXTROAMPHETAMINE SULFATE AND AMPHETAMINE SULFATE 7.5; 7.5; 7.5; 7.5 MG/1; MG/1; MG/1; MG/1
60 CAPSULE, EXTENDED RELEASE ORAL EVERY MORNING
Qty: 60 CAPSULE | Refills: 0 | Status: SHIPPED | OUTPATIENT
Start: 2023-03-02 | End: 2023-03-21

## 2023-03-10 ENCOUNTER — TELEPHONE (OUTPATIENT)
Dept: FAMILY MEDICINE | Facility: CLINIC | Age: 38
End: 2023-03-10

## 2023-03-10 NOTE — TELEPHONE ENCOUNTER
Patient called stating that she has paid $500 the past three months for her Adderall. She is requested that you prescribe it like  did in the past which was 30 of the 30 mg and 30 of the 25 mg. Patient stated that the insurance covered the medication that way.  Please advise.

## 2023-03-16 ENCOUNTER — PATIENT MESSAGE (OUTPATIENT)
Dept: REHABILITATION | Facility: HOSPITAL | Age: 38
End: 2023-03-16
Payer: MEDICAID

## 2023-03-16 ENCOUNTER — PATIENT MESSAGE (OUTPATIENT)
Dept: FAMILY MEDICINE | Facility: CLINIC | Age: 38
End: 2023-03-16

## 2023-03-20 ENCOUNTER — TELEPHONE (OUTPATIENT)
Dept: FAMILY MEDICINE | Facility: CLINIC | Age: 38
End: 2023-03-20

## 2023-03-20 NOTE — TELEPHONE ENCOUNTER
Patient called again about request of you prescribing her the double prescription instead of the one because her insurance will cover it that way. She stated that she is going on a week with her medication now. She had it prescribed 30 mg and 25 mg through .  Please advise.

## 2023-03-21 ENCOUNTER — PATIENT MESSAGE (OUTPATIENT)
Dept: FAMILY MEDICINE | Facility: CLINIC | Age: 38
End: 2023-03-21

## 2023-03-21 RX ORDER — DEXTROAMPHETAMINE SACCHARATE, AMPHETAMINE ASPARTATE MONOHYDRATE, DEXTROAMPHETAMINE SULFATE AND AMPHETAMINE SULFATE 6.25; 6.25; 6.25; 6.25 MG/1; MG/1; MG/1; MG/1
25 CAPSULE, EXTENDED RELEASE ORAL EVERY MORNING
Qty: 30 CAPSULE | Refills: 0 | Status: SHIPPED | OUTPATIENT
Start: 2023-03-21 | End: 2023-04-20 | Stop reason: SDUPTHER

## 2023-03-21 RX ORDER — DEXTROAMPHETAMINE SACCHARATE, AMPHETAMINE ASPARTATE MONOHYDRATE, DEXTROAMPHETAMINE SULFATE AND AMPHETAMINE SULFATE 7.5; 7.5; 7.5; 7.5 MG/1; MG/1; MG/1; MG/1
30 CAPSULE, EXTENDED RELEASE ORAL EVERY MORNING
Qty: 30 CAPSULE | Refills: 0 | Status: SHIPPED | OUTPATIENT
Start: 2023-03-21 | End: 2023-04-20 | Stop reason: SDUPTHER

## 2023-03-21 NOTE — TELEPHONE ENCOUNTER
Patient also called just before this message asking if she needed to come up here. Patient was advised that nothing more could be done that hadn't already been done at this point. Patient expressed understanding then sent this message right after she hung up the call.

## 2023-03-22 ENCOUNTER — TELEPHONE (OUTPATIENT)
Dept: FAMILY MEDICINE | Facility: CLINIC | Age: 38
End: 2023-03-22

## 2023-03-22 ENCOUNTER — PATIENT MESSAGE (OUTPATIENT)
Dept: FAMILY MEDICINE | Facility: CLINIC | Age: 38
End: 2023-03-22

## 2023-04-20 DIAGNOSIS — F90.9 ADULT ADHD: Primary | ICD-10-CM

## 2023-04-20 RX ORDER — DEXTROAMPHETAMINE SACCHARATE, AMPHETAMINE ASPARTATE MONOHYDRATE, DEXTROAMPHETAMINE SULFATE AND AMPHETAMINE SULFATE 7.5; 7.5; 7.5; 7.5 MG/1; MG/1; MG/1; MG/1
30 CAPSULE, EXTENDED RELEASE ORAL EVERY MORNING
Qty: 30 CAPSULE | Refills: 0 | Status: SHIPPED | OUTPATIENT
Start: 2023-04-20 | End: 2023-04-25 | Stop reason: SDUPTHER

## 2023-04-20 RX ORDER — DEXTROAMPHETAMINE SACCHARATE, AMPHETAMINE ASPARTATE MONOHYDRATE, DEXTROAMPHETAMINE SULFATE AND AMPHETAMINE SULFATE 6.25; 6.25; 6.25; 6.25 MG/1; MG/1; MG/1; MG/1
25 CAPSULE, EXTENDED RELEASE ORAL EVERY MORNING
Qty: 30 CAPSULE | Refills: 0 | Status: SHIPPED | OUTPATIENT
Start: 2023-04-20 | End: 2023-04-25 | Stop reason: SDUPTHER

## 2023-04-20 NOTE — TELEPHONE ENCOUNTER
Patient has appointment 23  Last Office Visit 23  Rescheduled for Dog  23  Next Office Visit 23    ----- Message from Liliane Herrera sent at 2023  9:57 AM CDT -----  Pt had to r/s  appt to  due to her dog having a  on . She requests that Dr. Crump send her adderall to [facility] in [place] for a refill. Please send script exactly like last refill. Pt states that is the only way insurance will pay for drug. She states this is how it was filled to pharmacy:    30 pills of 25mg  30 pills of 30 mg  Adderall

## 2023-04-25 ENCOUNTER — OFFICE VISIT (OUTPATIENT)
Dept: FAMILY MEDICINE | Facility: CLINIC | Age: 38
End: 2023-04-25
Payer: MEDICAID

## 2023-04-25 VITALS
HEIGHT: 59 IN | WEIGHT: 143.31 LBS | BODY MASS INDEX: 28.89 KG/M2 | DIASTOLIC BLOOD PRESSURE: 94 MMHG | SYSTOLIC BLOOD PRESSURE: 128 MMHG | OXYGEN SATURATION: 99 % | HEART RATE: 94 BPM

## 2023-04-25 DIAGNOSIS — F90.9 ADULT ADHD: ICD-10-CM

## 2023-04-25 PROCEDURE — 3074F PR MOST RECENT SYSTOLIC BLOOD PRESSURE < 130 MM HG: ICD-10-PCS | Mod: CPTII,,, | Performed by: FAMILY MEDICINE

## 2023-04-25 PROCEDURE — 3008F PR BODY MASS INDEX (BMI) DOCUMENTED: ICD-10-PCS | Mod: CPTII,,, | Performed by: FAMILY MEDICINE

## 2023-04-25 PROCEDURE — 99213 PR OFFICE/OUTPT VISIT, EST, LEVL III, 20-29 MIN: ICD-10-PCS | Mod: S$PBB,,, | Performed by: FAMILY MEDICINE

## 2023-04-25 PROCEDURE — 99214 OFFICE O/P EST MOD 30 MIN: CPT | Performed by: FAMILY MEDICINE

## 2023-04-25 PROCEDURE — 3080F PR MOST RECENT DIASTOLIC BLOOD PRESSURE >= 90 MM HG: ICD-10-PCS | Mod: CPTII,,, | Performed by: FAMILY MEDICINE

## 2023-04-25 PROCEDURE — 99213 OFFICE O/P EST LOW 20 MIN: CPT | Mod: S$PBB,,, | Performed by: FAMILY MEDICINE

## 2023-04-25 PROCEDURE — 1159F MED LIST DOCD IN RCRD: CPT | Mod: CPTII,,, | Performed by: FAMILY MEDICINE

## 2023-04-25 PROCEDURE — 1159F PR MEDICATION LIST DOCUMENTED IN MEDICAL RECORD: ICD-10-PCS | Mod: CPTII,,, | Performed by: FAMILY MEDICINE

## 2023-04-25 PROCEDURE — 3074F SYST BP LT 130 MM HG: CPT | Mod: CPTII,,, | Performed by: FAMILY MEDICINE

## 2023-04-25 PROCEDURE — 3008F BODY MASS INDEX DOCD: CPT | Mod: CPTII,,, | Performed by: FAMILY MEDICINE

## 2023-04-25 PROCEDURE — 3080F DIAST BP >= 90 MM HG: CPT | Mod: CPTII,,, | Performed by: FAMILY MEDICINE

## 2023-04-25 RX ORDER — DEXTROAMPHETAMINE SACCHARATE, AMPHETAMINE ASPARTATE MONOHYDRATE, DEXTROAMPHETAMINE SULFATE AND AMPHETAMINE SULFATE 7.5; 7.5; 7.5; 7.5 MG/1; MG/1; MG/1; MG/1
30 CAPSULE, EXTENDED RELEASE ORAL EVERY MORNING
Qty: 30 CAPSULE | Refills: 0 | Status: SHIPPED | OUTPATIENT
Start: 2023-07-20 | End: 2023-08-02 | Stop reason: SDUPTHER

## 2023-04-25 RX ORDER — DEXTROAMPHETAMINE SACCHARATE, AMPHETAMINE ASPARTATE MONOHYDRATE, DEXTROAMPHETAMINE SULFATE AND AMPHETAMINE SULFATE 7.5; 7.5; 7.5; 7.5 MG/1; MG/1; MG/1; MG/1
30 CAPSULE, EXTENDED RELEASE ORAL EVERY MORNING
Qty: 30 CAPSULE | Refills: 0 | Status: SHIPPED | OUTPATIENT
Start: 2023-05-20 | End: 2023-08-02 | Stop reason: SDUPTHER

## 2023-04-25 RX ORDER — DEXTROAMPHETAMINE SACCHARATE, AMPHETAMINE ASPARTATE MONOHYDRATE, DEXTROAMPHETAMINE SULFATE AND AMPHETAMINE SULFATE 7.5; 7.5; 7.5; 7.5 MG/1; MG/1; MG/1; MG/1
30 CAPSULE, EXTENDED RELEASE ORAL EVERY MORNING
Qty: 30 CAPSULE | Refills: 0 | Status: SHIPPED | OUTPATIENT
Start: 2023-06-20 | End: 2023-08-02 | Stop reason: SDUPTHER

## 2023-04-25 RX ORDER — DEXTROAMPHETAMINE SACCHARATE, AMPHETAMINE ASPARTATE MONOHYDRATE, DEXTROAMPHETAMINE SULFATE AND AMPHETAMINE SULFATE 6.25; 6.25; 6.25; 6.25 MG/1; MG/1; MG/1; MG/1
25 CAPSULE, EXTENDED RELEASE ORAL EVERY MORNING
Qty: 30 CAPSULE | Refills: 0 | Status: SHIPPED | OUTPATIENT
Start: 2023-07-20 | End: 2023-08-02 | Stop reason: SDUPTHER

## 2023-04-25 RX ORDER — DEXTROAMPHETAMINE SACCHARATE, AMPHETAMINE ASPARTATE MONOHYDRATE, DEXTROAMPHETAMINE SULFATE AND AMPHETAMINE SULFATE 6.25; 6.25; 6.25; 6.25 MG/1; MG/1; MG/1; MG/1
25 CAPSULE, EXTENDED RELEASE ORAL EVERY MORNING
Qty: 30 CAPSULE | Refills: 0 | Status: SHIPPED | OUTPATIENT
Start: 2023-05-20 | End: 2023-08-02 | Stop reason: SDUPTHER

## 2023-04-25 RX ORDER — DEXTROAMPHETAMINE SACCHARATE, AMPHETAMINE ASPARTATE MONOHYDRATE, DEXTROAMPHETAMINE SULFATE AND AMPHETAMINE SULFATE 6.25; 6.25; 6.25; 6.25 MG/1; MG/1; MG/1; MG/1
25 CAPSULE, EXTENDED RELEASE ORAL EVERY MORNING
Qty: 30 CAPSULE | Refills: 0 | Status: SHIPPED | OUTPATIENT
Start: 2023-06-20 | End: 2023-08-02 | Stop reason: SDUPTHER

## 2023-04-25 NOTE — PROGRESS NOTES
"  SUBJECTIVE:    Patient ID: Iris Blanchard is a 38 y.o. female.    Chief Complaint: ADHD  37 yo female new to this provider here today for refills on her ADD/ADHD medications.The patient is here today to follow up on attention deficit hyperactivity disorder. Three months ago, and was prescribed 30 qam, 25 q pm  mg per day amphetamine/dextroamphetamine ER. Since that time, she says she has been tolerating the medication well without chest pain, palpitations, anorexia, weight loss, or insomnia.     I reviewed her overdue health maintenance, patient is due for hepatitis-C screening, COVID-19 vaccine, pneumococcal vaccine, HIV screening, cervical cancer screening.     Significant past medical Hx  ADD: Adderall XR 55mg  HSV: Valtrex 500mg  Acne: Clindamycin 1%, Doxycycline 100mg  Contraception: Mirena     Specialists:  Ortho: Dr Yu  GYN: Dr Garrido  Derm: Dr Jones     Smoke: 1/2 ppd  ETOH: None  Exercise: walking the dog      HPI    ADHD Adult: On Medications  Have difficulty sustaining attention in tasks or fun activities?  no  Don't follow through on instructions and fail to finish work?  no  Have difficulty organizing tasks and activities?  no  Avoid, dislike, or are reluctant to engage in work thar requires sustained mental effort?  no  Easily distracted?  no  Forgetful in daily activities?  no  Fidget with hands or feet, or squirm in seat?  no  Have difficulty engaging in leisure activities or doing fun things quietly?  no  Feel "on the go" or "driven by a motor"?  no  Blurt out answers before questions have been completed?  no  Have difficulty waiting your turn, are impatient?  no  Interrupt or intrude on others?  no      Past Medical History:   Diagnosis Date    ADHD (attention deficit hyperactivity disorder)     Anxiety     Closed dislocation of right patella 09/24/2021     Social History     Socioeconomic History    Marital status: Single   Occupational History    Occupation: unemployed   Tobacco Use    " Smoking status: Every Day     Packs/day: 0.50     Types: Cigarettes    Smokeless tobacco: Never   Substance and Sexual Activity    Alcohol use: No    Drug use: No    Sexual activity: Yes     Partners: Female     Birth control/protection: I.U.D.     Past Surgical History:   Procedure Laterality Date    EYE SURGERY       Family History   Problem Relation Age of Onset    Hypertension Mother     Diabetes Father      Current Outpatient Medications   Medication Sig Dispense Refill    cetirizine (ZYRTEC) 10 MG tablet Take 1 tablet (10 mg total) by mouth once daily. 90 tablet 1    clindamycin (CLEOCIN T) 1 % external solution APPLY TO SKIN BID  5    clobetasoL (TEMOVATE) 0.05 % external solution USE TWICE DAILY AS NEEDED      naproxen (NAPROSYN) 500 MG tablet Take 1 tablet (500 mg total) by mouth after meals as needed (headache or pain). 60 tablet 3    valACYclovir (VALTREX) 500 MG tablet TK 1 T PO QD  10    [START ON 5/20/2023] dextroamphetamine-amphetamine (ADDERALL XR) 25 MG 24 hr capsule Take 1 capsule (25 mg total) by mouth every morning. 30 capsule 0    [START ON 6/20/2023] dextroamphetamine-amphetamine (ADDERALL XR) 25 MG 24 hr capsule Take 1 capsule (25 mg total) by mouth every morning. 30 capsule 0    [START ON 7/20/2023] dextroamphetamine-amphetamine (ADDERALL XR) 25 MG 24 hr capsule Take 1 capsule (25 mg total) by mouth every morning. 30 capsule 0    [START ON 5/20/2023] dextroamphetamine-amphetamine (ADDERALL XR) 30 MG 24 hr capsule Take 1 capsule (30 mg total) by mouth every morning. 30 capsule 0    [START ON 6/20/2023] dextroamphetamine-amphetamine (ADDERALL XR) 30 MG 24 hr capsule Take 1 capsule (30 mg total) by mouth every morning. 30 capsule 0    [START ON 7/20/2023] dextroamphetamine-amphetamine (ADDERALL XR) 30 MG 24 hr capsule Take 1 capsule (30 mg total) by mouth every morning. 30 capsule 0     No current facility-administered medications for this visit.     Review of patient's allergies indicates:  No  "Known Allergies    Review of Systems   Constitutional:  Negative for activity change, diaphoresis, fatigue and unexpected weight change.   HENT:  Negative for congestion, hearing loss, rhinorrhea and trouble swallowing.    Eyes:  Negative for discharge and visual disturbance.   Respiratory:  Negative for cough, chest tightness, shortness of breath and wheezing.    Cardiovascular:  Negative for chest pain and palpitations.   Gastrointestinal:  Negative for blood in stool, constipation, diarrhea and vomiting.   Endocrine: Negative for polydipsia and polyuria.   Genitourinary:  Negative for difficulty urinating, dysuria, hematuria and menstrual problem.   Musculoskeletal:  Negative for arthralgias, joint swelling and neck pain.   Neurological:  Negative for weakness and headaches.   Psychiatric/Behavioral:  Negative for confusion and dysphoric mood.         Blood pressure (!) 128/94, pulse 94, height 4' 11" (1.499 m), weight 65 kg (143 lb 4.8 oz), SpO2 99 %. Body mass index is 28.94 kg/m².   Objective:      Physical Exam  Vitals reviewed.   Constitutional:       General: She is not in acute distress.     Appearance: Normal appearance. She is well-developed. She is not ill-appearing or toxic-appearing.   HENT:      Head: Normocephalic and atraumatic.      Right Ear: External ear normal.      Left Ear: External ear normal.   Eyes:      General:         Right eye: No discharge.         Left eye: No discharge.      Conjunctiva/sclera: Conjunctivae normal.      Pupils: Pupils are equal, round, and reactive to light.   Cardiovascular:      Rate and Rhythm: Normal rate and regular rhythm.      Heart sounds: Normal heart sounds. No murmur heard.  Pulmonary:      Effort: Pulmonary effort is normal. No respiratory distress.      Breath sounds: Normal breath sounds.   Skin:     General: Skin is warm and dry.      Capillary Refill: Capillary refill takes less than 2 seconds.   Neurological:      Mental Status: She is alert.     "       Assessment:       1. Adult ADHD         Plan:           Adult ADHD  -     dextroamphetamine-amphetamine (ADDERALL XR) 30 MG 24 hr capsule; Take 1 capsule (30 mg total) by mouth every morning.  Dispense: 30 capsule; Refill: 0  -     dextroamphetamine-amphetamine (ADDERALL XR) 25 MG 24 hr capsule; Take 1 capsule (25 mg total) by mouth every morning.  Dispense: 30 capsule; Refill: 0  -     dextroamphetamine-amphetamine (ADDERALL XR) 30 MG 24 hr capsule; Take 1 capsule (30 mg total) by mouth every morning.  Dispense: 30 capsule; Refill: 0  -     dextroamphetamine-amphetamine (ADDERALL XR) 30 MG 24 hr capsule; Take 1 capsule (30 mg total) by mouth every morning.  Dispense: 30 capsule; Refill: 0  -     dextroamphetamine-amphetamine (ADDERALL XR) 25 MG 24 hr capsule; Take 1 capsule (25 mg total) by mouth every morning.  Dispense: 30 capsule; Refill: 0  -     dextroamphetamine-amphetamine (ADDERALL XR) 25 MG 24 hr capsule; Take 1 capsule (25 mg total) by mouth every morning.  Dispense: 30 capsule; Refill: 0

## 2023-07-31 ENCOUNTER — PATIENT MESSAGE (OUTPATIENT)
Dept: FAMILY MEDICINE | Facility: CLINIC | Age: 38
End: 2023-07-31

## 2023-07-31 DIAGNOSIS — F90.9 ADULT ADHD: ICD-10-CM

## 2023-07-31 RX ORDER — DEXTROAMPHETAMINE SACCHARATE, AMPHETAMINE ASPARTATE MONOHYDRATE, DEXTROAMPHETAMINE SULFATE AND AMPHETAMINE SULFATE 7.5; 7.5; 7.5; 7.5 MG/1; MG/1; MG/1; MG/1
30 CAPSULE, EXTENDED RELEASE ORAL EVERY MORNING
Qty: 30 CAPSULE | Refills: 0 | Status: CANCELLED | OUTPATIENT
Start: 2023-07-31

## 2023-07-31 RX ORDER — DEXTROAMPHETAMINE SACCHARATE, AMPHETAMINE ASPARTATE MONOHYDRATE, DEXTROAMPHETAMINE SULFATE AND AMPHETAMINE SULFATE 6.25; 6.25; 6.25; 6.25 MG/1; MG/1; MG/1; MG/1
25 CAPSULE, EXTENDED RELEASE ORAL EVERY MORNING
Qty: 30 CAPSULE | Refills: 0 | Status: CANCELLED | OUTPATIENT
Start: 2023-07-31

## 2023-08-02 ENCOUNTER — OFFICE VISIT (OUTPATIENT)
Dept: FAMILY MEDICINE | Facility: CLINIC | Age: 38
End: 2023-08-02

## 2023-08-02 VITALS
DIASTOLIC BLOOD PRESSURE: 84 MMHG | WEIGHT: 141.81 LBS | OXYGEN SATURATION: 97 % | BODY MASS INDEX: 28.59 KG/M2 | SYSTOLIC BLOOD PRESSURE: 124 MMHG | HEART RATE: 92 BPM | HEIGHT: 59 IN

## 2023-08-02 DIAGNOSIS — F90.9 ADULT ADHD: ICD-10-CM

## 2023-08-02 DIAGNOSIS — Z13.6 ENCOUNTER FOR LIPID SCREENING FOR CARDIOVASCULAR DISEASE: ICD-10-CM

## 2023-08-02 DIAGNOSIS — Z13.220 ENCOUNTER FOR LIPID SCREENING FOR CARDIOVASCULAR DISEASE: ICD-10-CM

## 2023-08-02 DIAGNOSIS — Z13.1 DIABETES MELLITUS SCREENING: Primary | ICD-10-CM

## 2023-08-02 PROCEDURE — 99213 PR OFFICE/OUTPT VISIT, EST, LEVL III, 20-29 MIN: ICD-10-PCS | Mod: S$PBB,,, | Performed by: FAMILY MEDICINE

## 2023-08-02 PROCEDURE — 99213 OFFICE O/P EST LOW 20 MIN: CPT | Performed by: FAMILY MEDICINE

## 2023-08-02 PROCEDURE — 99213 OFFICE O/P EST LOW 20 MIN: CPT | Mod: S$PBB,,, | Performed by: FAMILY MEDICINE

## 2023-08-02 RX ORDER — DEXTROAMPHETAMINE SACCHARATE, AMPHETAMINE ASPARTATE MONOHYDRATE, DEXTROAMPHETAMINE SULFATE AND AMPHETAMINE SULFATE 6.25; 6.25; 6.25; 6.25 MG/1; MG/1; MG/1; MG/1
25 CAPSULE, EXTENDED RELEASE ORAL EVERY MORNING
Qty: 30 CAPSULE | Refills: 0 | Status: SHIPPED | OUTPATIENT
Start: 2023-10-02 | End: 2024-02-01

## 2023-08-02 RX ORDER — DEXTROAMPHETAMINE SACCHARATE, AMPHETAMINE ASPARTATE MONOHYDRATE, DEXTROAMPHETAMINE SULFATE AND AMPHETAMINE SULFATE 6.25; 6.25; 6.25; 6.25 MG/1; MG/1; MG/1; MG/1
25 CAPSULE, EXTENDED RELEASE ORAL EVERY MORNING
Qty: 30 CAPSULE | Refills: 0 | Status: SHIPPED | OUTPATIENT
Start: 2023-09-02 | End: 2024-02-01

## 2023-08-02 RX ORDER — DEXTROAMPHETAMINE SACCHARATE, AMPHETAMINE ASPARTATE MONOHYDRATE, DEXTROAMPHETAMINE SULFATE AND AMPHETAMINE SULFATE 7.5; 7.5; 7.5; 7.5 MG/1; MG/1; MG/1; MG/1
30 CAPSULE, EXTENDED RELEASE ORAL EVERY MORNING
Qty: 30 CAPSULE | Refills: 0 | Status: SHIPPED | OUTPATIENT
Start: 2023-08-02 | End: 2024-02-01

## 2023-08-02 RX ORDER — DEXTROAMPHETAMINE SACCHARATE, AMPHETAMINE ASPARTATE MONOHYDRATE, DEXTROAMPHETAMINE SULFATE AND AMPHETAMINE SULFATE 7.5; 7.5; 7.5; 7.5 MG/1; MG/1; MG/1; MG/1
30 CAPSULE, EXTENDED RELEASE ORAL EVERY MORNING
Qty: 30 CAPSULE | Refills: 0 | Status: SHIPPED | OUTPATIENT
Start: 2023-09-02 | End: 2024-02-01

## 2023-08-02 RX ORDER — DEXTROAMPHETAMINE SACCHARATE, AMPHETAMINE ASPARTATE MONOHYDRATE, DEXTROAMPHETAMINE SULFATE AND AMPHETAMINE SULFATE 7.5; 7.5; 7.5; 7.5 MG/1; MG/1; MG/1; MG/1
30 CAPSULE, EXTENDED RELEASE ORAL EVERY MORNING
Qty: 30 CAPSULE | Refills: 0 | Status: SHIPPED | OUTPATIENT
Start: 2023-10-02 | End: 2024-02-01

## 2023-08-02 RX ORDER — DEXTROAMPHETAMINE SACCHARATE, AMPHETAMINE ASPARTATE MONOHYDRATE, DEXTROAMPHETAMINE SULFATE AND AMPHETAMINE SULFATE 6.25; 6.25; 6.25; 6.25 MG/1; MG/1; MG/1; MG/1
25 CAPSULE, EXTENDED RELEASE ORAL EVERY MORNING
Qty: 30 CAPSULE | Refills: 0 | Status: SHIPPED | OUTPATIENT
Start: 2023-08-02 | End: 2024-02-01

## 2023-08-02 NOTE — PROGRESS NOTES
"  SUBJECTIVE:    Patient ID: Iris Blanchard is a 38 y.o. female.    Chief Complaint: ADHD  39 yo female new to this provider here today for refills on her ADD/ADHD medications.The patient is here today to follow up on attention deficit hyperactivity disorder. Three months ago, and was prescribed 30 qam, 25 q pm  mg per day amphetamine/dextroamphetamine ER. Since that time, she says she has been tolerating the medication well without chest pain, palpitations, anorexia, weight loss, or insomnia.     I reviewed her overdue health maintenance, patient is due for hepatitis-C screening, COVID-19 vaccine, pneumococcal vaccine, HIV screening, cervical cancer screening.     Significant past medical Hx  ADD: Adderall XR 55mg  HSV: Valtrex 500mg  Acne: Clindamycin 1%, Doxycycline 100mg  Contraception: Mirena     Specialists:  Ortho: Dr Yu  GYN: Dr Garrido  Derm: Dr Jones     Smoke: 1/2 ppd  ETOH: None  Exercise: walking the dog    HPI    ADHD Adult: On Medications  Have difficulty sustaining attention in tasks or fun activities?  no  Don't follow through on instructions and fail to finish work?  no  Have difficulty organizing tasks and activities?  no  Avoid, dislike, or are reluctant to engage in work thar requires sustained mental effort?  no  Easily distracted?  no  Forgetful in daily activities?  no  Fidget with hands or feet, or squirm in seat?  no  Have difficulty engaging in leisure activities or doing fun things quietly?  no  Feel "on the go" or "driven by a motor"?  no  Blurt out answers before questions have been completed?  no  Have difficulty waiting your turn, are impatient?  no  Interrupt or intrude on others?  no       Past Medical History:   Diagnosis Date    ADHD (attention deficit hyperactivity disorder)     Anxiety     Closed dislocation of right patella 09/24/2021     Social History     Socioeconomic History    Marital status: Single   Occupational History    Occupation: unemployed   Tobacco Use    " Smoking status: Every Day     Current packs/day: 0.50     Types: Cigarettes    Smokeless tobacco: Never   Substance and Sexual Activity    Alcohol use: No    Drug use: No    Sexual activity: Yes     Partners: Female     Birth control/protection: I.U.D.     Social Determinants of Health     Stress: No Stress Concern Present (11/19/2019)    [facility] [place] [facility] - Occupational Stress Questionnaire     Feeling of Stress : Not at all     Past Surgical History:   Procedure Laterality Date    EYE SURGERY       Family History   Problem Relation Age of Onset    Hypertension Mother     Diabetes Father      Current Outpatient Medications   Medication Sig Dispense Refill    cetirizine (ZYRTEC) 10 MG tablet Take 1 tablet (10 mg total) by mouth once daily. 90 tablet 1    clindamycin (CLEOCIN T) 1 % external solution APPLY TO SKIN BID  5    clobetasoL (TEMOVATE) 0.05 % external solution USE TWICE DAILY AS NEEDED      naproxen (NAPROSYN) 500 MG tablet Take 1 tablet (500 mg total) by mouth after meals as needed (headache or pain). 60 tablet 3    valACYclovir (VALTREX) 500 MG tablet TK 1 T PO QD  10    dextroamphetamine-amphetamine (ADDERALL XR) 25 MG 24 hr capsule Take 1 capsule (25 mg total) by mouth every morning. 30 capsule 0    [START ON 9/2/2023] dextroamphetamine-amphetamine (ADDERALL XR) 25 MG 24 hr capsule Take 1 capsule (25 mg total) by mouth every morning. 30 capsule 0    [START ON 10/2/2023] dextroamphetamine-amphetamine (ADDERALL XR) 25 MG 24 hr capsule Take 1 capsule (25 mg total) by mouth every morning. 30 capsule 0    dextroamphetamine-amphetamine (ADDERALL XR) 30 MG 24 hr capsule Take 1 capsule (30 mg total) by mouth every morning. 30 capsule 0    [START ON 9/2/2023] dextroamphetamine-amphetamine (ADDERALL XR) 30 MG 24 hr capsule Take 1 capsule (30 mg total) by mouth every morning. 30 capsule 0    [START ON 10/2/2023] dextroamphetamine-amphetamine (ADDERALL XR) 30 MG 24 hr capsule Take 1 capsule  "(30 mg total) by mouth every morning. 30 capsule 0     No current facility-administered medications for this visit.     Review of patient's allergies indicates:  No Known Allergies    Review of Systems   Constitutional:  Negative for activity change, diaphoresis, fatigue and unexpected weight change.   HENT:  Negative for congestion, hearing loss, rhinorrhea and trouble swallowing.    Eyes:  Negative for discharge and visual disturbance.   Respiratory:  Negative for cough, chest tightness, shortness of breath and wheezing.    Cardiovascular:  Negative for chest pain and palpitations.   Gastrointestinal:  Negative for blood in stool, constipation, diarrhea and vomiting.   Endocrine: Negative for polydipsia and polyuria.   Genitourinary:  Negative for difficulty urinating, dysuria, hematuria and menstrual problem.   Musculoskeletal:  Negative for arthralgias, joint swelling and neck pain.   Neurological:  Negative for weakness and headaches.   Psychiatric/Behavioral:  Negative for confusion and dysphoric mood.           Blood pressure 124/84, pulse 92, height 4' 11" (1.499 m), weight 64.3 kg (141 lb 12.8 oz), SpO2 97 %. Body mass index is 28.64 kg/m².   Objective:      Physical Exam  Vitals reviewed.   Constitutional:       General: She is not in acute distress.     Appearance: Normal appearance. She is well-developed. She is not ill-appearing or toxic-appearing.   HENT:      Head: Normocephalic and atraumatic.      Right Ear: External ear normal.      Left Ear: External ear normal.   Eyes:      General:         Right eye: No discharge.         Left eye: No discharge.      Conjunctiva/sclera: Conjunctivae normal.      Pupils: Pupils are equal, round, and reactive to light.   Cardiovascular:      Rate and Rhythm: Normal rate and regular rhythm.      Heart sounds: Normal heart sounds. No murmur heard.  Pulmonary:      Effort: Pulmonary effort is normal. No respiratory distress.      Breath sounds: Normal breath sounds. "   Skin:     General: Skin is warm and dry.      Capillary Refill: Capillary refill takes less than 2 seconds.   Neurological:      Mental Status: She is alert.             Assessment:       1. Diabetes mellitus screening    2. Adult ADHD    3. Encounter for lipid screening for cardiovascular disease         Plan:           Diabetes mellitus screening  -     Comprehensive Metabolic Panel; Future; Expected date: 08/02/2023    Adult ADHD  -     dextroamphetamine-amphetamine (ADDERALL XR) 30 MG 24 hr capsule; Take 1 capsule (30 mg total) by mouth every morning.  Dispense: 30 capsule; Refill: 0  -     dextroamphetamine-amphetamine (ADDERALL XR) 25 MG 24 hr capsule; Take 1 capsule (25 mg total) by mouth every morning.  Dispense: 30 capsule; Refill: 0  -     dextroamphetamine-amphetamine (ADDERALL XR) 25 MG 24 hr capsule; Take 1 capsule (25 mg total) by mouth every morning.  Dispense: 30 capsule; Refill: 0  -     dextroamphetamine-amphetamine (ADDERALL XR) 30 MG 24 hr capsule; Take 1 capsule (30 mg total) by mouth every morning.  Dispense: 30 capsule; Refill: 0  -     dextroamphetamine-amphetamine (ADDERALL XR) 30 MG 24 hr capsule; Take 1 capsule (30 mg total) by mouth every morning.  Dispense: 30 capsule; Refill: 0  -     dextroamphetamine-amphetamine (ADDERALL XR) 25 MG 24 hr capsule; Take 1 capsule (25 mg total) by mouth every morning.  Dispense: 30 capsule; Refill: 0    Encounter for lipid screening for cardiovascular disease  -     Lipid Panel; Future; Expected date: 08/02/2023

## 2023-10-31 ENCOUNTER — OFFICE VISIT (OUTPATIENT)
Dept: FAMILY MEDICINE | Facility: CLINIC | Age: 38
End: 2023-10-31
Payer: MEDICAID

## 2023-10-31 VITALS
DIASTOLIC BLOOD PRESSURE: 86 MMHG | SYSTOLIC BLOOD PRESSURE: 119 MMHG | WEIGHT: 141.5 LBS | OXYGEN SATURATION: 99 % | BODY MASS INDEX: 28.58 KG/M2 | HEART RATE: 92 BPM

## 2023-10-31 DIAGNOSIS — F90.9 ADULT ADHD: Primary | ICD-10-CM

## 2023-10-31 DIAGNOSIS — Z11.59 ENCOUNTER FOR HEPATITIS C SCREENING TEST FOR LOW RISK PATIENT: ICD-10-CM

## 2023-10-31 DIAGNOSIS — E78.89 LIPIDS ABNORMAL: ICD-10-CM

## 2023-10-31 DIAGNOSIS — Z23 NEED FOR IMMUNIZATION AGAINST INFLUENZA: ICD-10-CM

## 2023-10-31 DIAGNOSIS — Z13.1 DIABETES MELLITUS SCREENING: ICD-10-CM

## 2023-10-31 DIAGNOSIS — Z11.4 ENCOUNTER FOR SCREENING FOR HIV: ICD-10-CM

## 2023-10-31 PROCEDURE — 99213 PR OFFICE/OUTPT VISIT, EST, LEVL III, 20-29 MIN: ICD-10-PCS | Mod: S$PBB,,, | Performed by: FAMILY MEDICINE

## 2023-10-31 PROCEDURE — 99999PBSHW FLU VACCINE (QUAD) GREATER THAN OR EQUAL TO 3YO PRESERVATIVE FREE IM: Mod: PBBFAC,,,

## 2023-10-31 PROCEDURE — 3074F PR MOST RECENT SYSTOLIC BLOOD PRESSURE < 130 MM HG: ICD-10-PCS | Mod: CPTII,,, | Performed by: FAMILY MEDICINE

## 2023-10-31 PROCEDURE — 99213 OFFICE O/P EST LOW 20 MIN: CPT | Performed by: FAMILY MEDICINE

## 2023-10-31 PROCEDURE — 3008F BODY MASS INDEX DOCD: CPT | Mod: CPTII,,, | Performed by: FAMILY MEDICINE

## 2023-10-31 PROCEDURE — 3008F PR BODY MASS INDEX (BMI) DOCUMENTED: ICD-10-PCS | Mod: CPTII,,, | Performed by: FAMILY MEDICINE

## 2023-10-31 PROCEDURE — 3079F DIAST BP 80-89 MM HG: CPT | Mod: CPTII,,, | Performed by: FAMILY MEDICINE

## 2023-10-31 PROCEDURE — 1159F PR MEDICATION LIST DOCUMENTED IN MEDICAL RECORD: ICD-10-PCS | Mod: CPTII,,, | Performed by: FAMILY MEDICINE

## 2023-10-31 PROCEDURE — 99213 OFFICE O/P EST LOW 20 MIN: CPT | Mod: S$PBB,,, | Performed by: FAMILY MEDICINE

## 2023-10-31 PROCEDURE — 3074F SYST BP LT 130 MM HG: CPT | Mod: CPTII,,, | Performed by: FAMILY MEDICINE

## 2023-10-31 PROCEDURE — 90471 IMMUNIZATION ADMIN: CPT | Mod: PBBFAC | Performed by: FAMILY MEDICINE

## 2023-10-31 PROCEDURE — 99999PBSHW FLU VACCINE (QUAD) GREATER THAN OR EQUAL TO 3YO PRESERVATIVE FREE IM: ICD-10-PCS | Mod: PBBFAC,,,

## 2023-10-31 PROCEDURE — 1159F MED LIST DOCD IN RCRD: CPT | Mod: CPTII,,, | Performed by: FAMILY MEDICINE

## 2023-10-31 PROCEDURE — 3079F PR MOST RECENT DIASTOLIC BLOOD PRESSURE 80-89 MM HG: ICD-10-PCS | Mod: CPTII,,, | Performed by: FAMILY MEDICINE

## 2023-10-31 RX ORDER — LISDEXAMFETAMINE DIMESYLATE 30 MG/1
30 CAPSULE ORAL EVERY MORNING
Qty: 30 CAPSULE | Refills: 0 | Status: SHIPPED | OUTPATIENT
Start: 2023-10-31 | End: 2023-12-21 | Stop reason: SDUPTHER

## 2023-10-31 NOTE — PROGRESS NOTES
"  SUBJECTIVE:    Patient ID: Iris Blanchard is a 38 y.o. female.    Chief Complaint: ADHD (ADD)  37 yo female new to this provider here today for refills on her ADD/ADHD medications.The patient is here today to follow up on attention deficit hyperactivity disorder. Three months ago, and was prescribed 30 qam, 25 q pm  mg per day  pt states that she watched a video and saw how the amphetamines change the brain she would like to decrease medications we discussed changing to Lisdexamphetamine.    I reviewed her overdue health maintenance, patient is due for hepatitis-C screening, COVID-19 vaccine, pneumococcal vaccine, HIV screening, cervical cancer screening.     Significant past medical Hx  ADD: Adderall XR 55mg  HSV: Valtrex 500mg  Acne: Clindamycin 1%, Doxycycline 100mg  Contraception: Mirena     Specialists:  Ortho: Dr Yu  GYN: Dr Garrido  Derm: Dr Jones     Smoke: 1/2 ppd  ETOH: None  Exercise: walking the dog    HPI    ADHD Adult: On Medications  Have difficulty sustaining attention in tasks or fun activities?  no  Don't follow through on instructions and fail to finish work?  no  Have difficulty organizing tasks and activities?  no  Avoid, dislike, or are reluctant to engage in work thar requires sustained mental effort?  no  Easily distracted?  no  Forgetful in daily activities?  no  Fidget with hands or feet, or squirm in seat?  no  Have difficulty engaging in leisure activities or doing fun things quietly?  no  Feel "on the go" or "driven by a motor"?  no  Blurt out answers before questions have been completed?  no  Have difficulty waiting your turn, are impatient?  no  Interrupt or intrude on others?  no      Past Medical History:   Diagnosis Date    ADHD (attention deficit hyperactivity disorder)     Anxiety     Closed dislocation of right patella 09/24/2021     Social History     Socioeconomic History    Marital status: Single   Occupational History    Occupation: unemployed   Tobacco Use    " Smoking status: Every Day     Current packs/day: 0.50     Types: Cigarettes    Smokeless tobacco: Never   Substance and Sexual Activity    Alcohol use: No    Drug use: No    Sexual activity: Yes     Partners: Female     Birth control/protection: I.U.D.     Social Determinants of Health     Stress: No Stress Concern Present (11/19/2019)    [facility] [place] of Occupational Health - Occupational Stress Questionnaire     Feeling of Stress : Not at all     Past Surgical History:   Procedure Laterality Date    EYE SURGERY       Family History   Problem Relation Age of Onset    Hypertension Mother     Diabetes Father      Current Outpatient Medications   Medication Sig Dispense Refill    cetirizine (ZYRTEC) 10 MG tablet Take 1 tablet (10 mg total) by mouth once daily. 90 tablet 1    clindamycin (CLEOCIN T) 1 % external solution APPLY TO SKIN BID  5    clobetasoL (TEMOVATE) 0.05 % external solution USE TWICE DAILY AS NEEDED      dextroamphetamine-amphetamine (ADDERALL XR) 25 MG 24 hr capsule Take 1 capsule (25 mg total) by mouth every morning. 30 capsule 0    dextroamphetamine-amphetamine (ADDERALL XR) 25 MG 24 hr capsule Take 1 capsule (25 mg total) by mouth every morning. 30 capsule 0    dextroamphetamine-amphetamine (ADDERALL XR) 25 MG 24 hr capsule Take 1 capsule (25 mg total) by mouth every morning. 30 capsule 0    dextroamphetamine-amphetamine (ADDERALL XR) 30 MG 24 hr capsule Take 1 capsule (30 mg total) by mouth every morning. 30 capsule 0    dextroamphetamine-amphetamine (ADDERALL XR) 30 MG 24 hr capsule Take 1 capsule (30 mg total) by mouth every morning. 30 capsule 0    dextroamphetamine-amphetamine (ADDERALL XR) 30 MG 24 hr capsule Take 1 capsule (30 mg total) by mouth every morning. 30 capsule 0    naproxen (NAPROSYN) 500 MG tablet Take 1 tablet (500 mg total) by mouth after meals as needed (headache or pain). 60 tablet 3    valACYclovir (VALTREX) 500 MG tablet TK 1 T PO QD  10    lisdexamfetamine (VYVANSE) 30  MG capsule Take 1 capsule (30 mg total) by mouth every morning. 30 capsule 0     No current facility-administered medications for this visit.     Review of patient's allergies indicates:  No Known Allergies    Review of Systems   Constitutional:  Negative for activity change, diaphoresis, fatigue and unexpected weight change.   HENT:  Negative for congestion, hearing loss, rhinorrhea and trouble swallowing.    Eyes:  Negative for discharge and visual disturbance.   Respiratory:  Negative for cough, chest tightness, shortness of breath and wheezing.    Cardiovascular:  Negative for chest pain and palpitations.   Gastrointestinal:  Negative for blood in stool, constipation, diarrhea and vomiting.   Endocrine: Negative for polydipsia and polyuria.   Genitourinary:  Negative for difficulty urinating, dysuria, hematuria and menstrual problem.   Musculoskeletal:  Negative for arthralgias, joint swelling and neck pain.   Neurological:  Negative for weakness and headaches.   Psychiatric/Behavioral:  Negative for confusion and dysphoric mood.           Blood pressure 119/86, pulse 92, weight 64.2 kg (141 lb 8 oz), SpO2 99 %. Body mass index is 28.58 kg/m².   Objective:      Physical Exam  Vitals reviewed.   Constitutional:       General: She is not in acute distress.     Appearance: Normal appearance. She is well-developed. She is not ill-appearing or toxic-appearing.   HENT:      Head: Normocephalic and atraumatic.      Right Ear: External ear normal.      Left Ear: External ear normal.   Eyes:      General:         Right eye: No discharge.         Left eye: No discharge.      Conjunctiva/sclera: Conjunctivae normal.      Pupils: Pupils are equal, round, and reactive to light.   Cardiovascular:      Rate and Rhythm: Normal rate and regular rhythm.      Heart sounds: Normal heart sounds. No murmur heard.  Pulmonary:      Effort: Pulmonary effort is normal. No respiratory distress.      Breath sounds: Normal breath sounds.    Skin:     General: Skin is warm and dry.      Capillary Refill: Capillary refill takes less than 2 seconds.   Neurological:      Mental Status: She is alert.             Assessment:       1. Adult ADHD    2. Need for immunization against influenza    3. Diabetes mellitus screening    4. Lipids abnormal    5. Encounter for screening for HIV    6. Encounter for hepatitis C screening test for low risk patient         Plan:           Adult ADHD  -     lisdexamfetamine (VYVANSE) 30 MG capsule; Take 1 capsule (30 mg total) by mouth every morning.  Dispense: 30 capsule; Refill: 0    Need for immunization against influenza  -     Influenza - Quadrivalent *Preferred* (6 months+) (PF)    Diabetes mellitus screening  -     Cancel: Comprehensive Metabolic Panel; Future; Expected date: 10/31/2023  -     Comprehensive Metabolic Panel; Future; Expected date: 10/31/2023    Lipids abnormal  -     Cancel: Lipid Panel; Future; Expected date: 10/31/2023  -     Lipid Panel; Future; Expected date: 10/31/2023    Encounter for screening for HIV  -     Cancel: HIV 1/2 Ag/Ab (4th Gen); Future; Expected date: 10/31/2023  -     HIV 1/2 Ag/Ab (4th Gen); Future; Expected date: 10/31/2023    Encounter for hepatitis C screening test for low risk patient  -     Cancel: Hepatitis C Antibody; Future; Expected date: 10/31/2023  -     Hepatitis C Antibody; Future; Expected date: 10/31/2023

## 2023-12-11 ENCOUNTER — PATIENT MESSAGE (OUTPATIENT)
Dept: FAMILY MEDICINE | Facility: CLINIC | Age: 38
End: 2023-12-11

## 2023-12-18 NOTE — TELEPHONE ENCOUNTER
Patient portal message     At my last appointment we swapped my medication to Vyvanse. I honestly feel a lot better since I switched, the only issue Im having is that I take it at 7:30 in the morning and I feel like it works for maybe 2-3 hours at the max. And Im taking naps in the middle of the day. Which is not good for me. So I was wondering if I could possibly get my medication refill and let me know what you would suggest as far as getting my medication to last longer. Hope to hear from you soon

## 2023-12-19 ENCOUNTER — PATIENT MESSAGE (OUTPATIENT)
Dept: FAMILY MEDICINE | Facility: CLINIC | Age: 38
End: 2023-12-19
Payer: MEDICAID

## 2023-12-21 ENCOUNTER — PATIENT MESSAGE (OUTPATIENT)
Dept: FAMILY MEDICINE | Facility: CLINIC | Age: 38
End: 2023-12-21
Payer: MEDICAID

## 2023-12-21 DIAGNOSIS — F90.9 ADULT ADHD: ICD-10-CM

## 2023-12-21 RX ORDER — LISDEXAMFETAMINE DIMESYLATE 30 MG/1
30 CAPSULE ORAL EVERY MORNING
Qty: 30 CAPSULE | Refills: 0 | Status: SHIPPED | OUTPATIENT
Start: 2024-01-21 | End: 2024-02-01

## 2023-12-21 RX ORDER — LISDEXAMFETAMINE DIMESYLATE 30 MG/1
30 CAPSULE ORAL EVERY MORNING
Qty: 30 CAPSULE | Refills: 0 | Status: SHIPPED | OUTPATIENT
Start: 2024-01-21 | End: 2023-12-21 | Stop reason: SDUPTHER

## 2023-12-21 RX ORDER — LISDEXAMFETAMINE DIMESYLATE 30 MG/1
30 CAPSULE ORAL EVERY MORNING
Qty: 30 CAPSULE | Refills: 0 | Status: SHIPPED | OUTPATIENT
Start: 2023-12-21 | End: 2024-02-01

## 2023-12-21 RX ORDER — LISDEXAMFETAMINE DIMESYLATE 30 MG/1
30 CAPSULE ORAL EVERY MORNING
Qty: 30 CAPSULE | Refills: 0 | Status: SHIPPED | OUTPATIENT
Start: 2023-12-21 | End: 2023-12-21 | Stop reason: SDUPTHER

## 2024-01-25 ENCOUNTER — PATIENT MESSAGE (OUTPATIENT)
Dept: FAMILY MEDICINE | Facility: CLINIC | Age: 39
End: 2024-01-25
Payer: MEDICAID

## 2024-02-01 ENCOUNTER — OFFICE VISIT (OUTPATIENT)
Dept: FAMILY MEDICINE | Facility: CLINIC | Age: 39
End: 2024-02-01
Payer: MEDICAID

## 2024-02-01 VITALS
BODY MASS INDEX: 28.59 KG/M2 | DIASTOLIC BLOOD PRESSURE: 95 MMHG | SYSTOLIC BLOOD PRESSURE: 143 MMHG | HEIGHT: 59 IN | HEART RATE: 98 BPM | WEIGHT: 141.81 LBS | OXYGEN SATURATION: 100 %

## 2024-02-01 DIAGNOSIS — F90.9 ADULT ADHD: Primary | ICD-10-CM

## 2024-02-01 PROCEDURE — 3008F BODY MASS INDEX DOCD: CPT | Mod: CPTII,,, | Performed by: FAMILY MEDICINE

## 2024-02-01 PROCEDURE — 3080F DIAST BP >= 90 MM HG: CPT | Mod: CPTII,,, | Performed by: FAMILY MEDICINE

## 2024-02-01 PROCEDURE — 99999 PR PBB SHADOW E&M-EST. PATIENT-LVL III: CPT | Mod: PBBFAC,,, | Performed by: FAMILY MEDICINE

## 2024-02-01 PROCEDURE — 3077F SYST BP >= 140 MM HG: CPT | Mod: CPTII,,, | Performed by: FAMILY MEDICINE

## 2024-02-01 PROCEDURE — 99213 OFFICE O/P EST LOW 20 MIN: CPT | Mod: S$PBB,,, | Performed by: FAMILY MEDICINE

## 2024-02-01 PROCEDURE — 1159F MED LIST DOCD IN RCRD: CPT | Mod: CPTII,,, | Performed by: FAMILY MEDICINE

## 2024-02-01 PROCEDURE — 99213 OFFICE O/P EST LOW 20 MIN: CPT | Mod: PBBFAC,PN | Performed by: FAMILY MEDICINE

## 2024-02-01 RX ORDER — LISDEXAMFETAMINE DIMESYLATE 50 MG/1
50 CAPSULE ORAL EVERY MORNING
Qty: 30 CAPSULE | Refills: 0 | Status: SHIPPED | OUTPATIENT
Start: 2024-02-01 | End: 2024-04-12 | Stop reason: SDUPTHER

## 2024-02-01 RX ORDER — LISDEXAMFETAMINE DIMESYLATE 50 MG/1
50 CAPSULE ORAL EVERY MORNING
Qty: 30 CAPSULE | Refills: 0 | Status: SHIPPED | OUTPATIENT
Start: 2024-03-01 | End: 2024-05-06 | Stop reason: ALTCHOICE

## 2024-02-01 RX ORDER — LISDEXAMFETAMINE DIMESYLATE 50 MG/1
50 CAPSULE ORAL EVERY MORNING
Qty: 30 CAPSULE | Refills: 0 | Status: SHIPPED | OUTPATIENT
Start: 2024-04-01 | End: 2024-05-06 | Stop reason: ALTCHOICE

## 2024-02-01 NOTE — PROGRESS NOTES
SUBJECTIVE:    Patient ID: Iris Blanchard is a 39 y.o. female.    Chief Complaint: ADHD  40 yo female new to this provider here today for refills on her ADD/ADHD medications.The patient is here today to follow up on attention deficit hyperactivity disorder. Three months ago, and was prescribed Lisdexamphetamine 30mg pt does not think it is doing as well as the adderall, we discussed increasing her medications. .     I reviewed her overdue health maintenance, patient is due for hepatitis-C screening, COVID-19 vaccine, pneumococcal vaccine, HIV screening, cervical cancer screening.     Significant past medical Hx  ADD: Adderall XR 55mg  HSV: Valtrex 500mg  Acne: Clindamycin 1%, Doxycycline 100mg  Contraception: Mirena     Specialists:  Ortho: Dr Yu  GYN: Dr Garrido  Derm: Dr Jones     Smoke: 1/2 ppd  ETOH: None  Exercise: walking the dog  HPI      Past Medical History:   Diagnosis Date    ADHD (attention deficit hyperactivity disorder)     Anxiety     Closed dislocation of right patella 09/24/2021     Social History     Socioeconomic History    Marital status: Single   Occupational History    Occupation: unemployed   Tobacco Use    Smoking status: Every Day     Current packs/day: 0.50     Types: Cigarettes    Smokeless tobacco: Never   Substance and Sexual Activity    Alcohol use: No    Drug use: No    Sexual activity: Yes     Partners: Female     Birth control/protection: I.U.D.     Social Determinants of Health     Stress: No Stress Concern Present (11/19/2019)    [employer] [place] [facility] [employer] - Occupational Stress Questionnaire     Feeling of Stress : Not at all     Past Surgical History:   Procedure Laterality Date    EYE SURGERY       Family History   Problem Relation Age of Onset    Hypertension Mother     Diabetes Father      Current Outpatient Medications   Medication Sig Dispense Refill    cetirizine (ZYRTEC) 10 MG tablet Take 1 tablet (10 mg total) by mouth once daily. 90 tablet 1     "clindamycin (CLEOCIN T) 1 % external solution APPLY TO SKIN BID  5    clobetasoL (TEMOVATE) 0.05 % external solution USE TWICE DAILY AS NEEDED      naproxen (NAPROSYN) 500 MG tablet Take 1 tablet (500 mg total) by mouth after meals as needed (headache or pain). 60 tablet 3    valACYclovir (VALTREX) 500 MG tablet TK 1 T PO QD  10    lisdexamfetamine (VYVANSE) 50 MG capsule Take 1 capsule (50 mg total) by mouth every morning. 30 capsule 0    [START ON 3/1/2024] lisdexamfetamine (VYVANSE) 50 MG capsule Take 1 capsule (50 mg total) by mouth every morning. 30 capsule 0    [START ON 4/1/2024] lisdexamfetamine (VYVANSE) 50 MG capsule Take 1 capsule (50 mg total) by mouth every morning. 30 capsule 0     No current facility-administered medications for this visit.     Review of patient's allergies indicates:  No Known Allergies    Review of Systems   Constitutional:  Negative for activity change, diaphoresis, fatigue and unexpected weight change.   HENT:  Negative for congestion, hearing loss, rhinorrhea and trouble swallowing.    Eyes:  Negative for discharge and visual disturbance.   Respiratory:  Negative for cough, chest tightness, shortness of breath and wheezing.    Cardiovascular:  Negative for chest pain and palpitations.   Gastrointestinal:  Negative for blood in stool, constipation, diarrhea and vomiting.   Endocrine: Negative for polydipsia and polyuria.   Genitourinary:  Negative for difficulty urinating, dysuria, hematuria and menstrual problem.   Musculoskeletal:  Negative for arthralgias, joint swelling and neck pain.   Neurological:  Negative for weakness and headaches.   Psychiatric/Behavioral:  Negative for confusion and dysphoric mood.           Blood pressure (!) 143/95, pulse 98, height 4' 11" (1.499 m), weight 64.3 kg (141 lb 12.8 oz), SpO2 100 %. Body mass index is 28.64 kg/m².   Objective:      Physical Exam  Vitals reviewed.   Constitutional:       General: She is not in acute distress.     " Appearance: Normal appearance. She is well-developed. She is not ill-appearing or toxic-appearing.   HENT:      Head: Normocephalic and atraumatic.      Right Ear: External ear normal.      Left Ear: External ear normal.   Eyes:      General:         Right eye: No discharge.         Left eye: No discharge.      Conjunctiva/sclera: Conjunctivae normal.      Pupils: Pupils are equal, round, and reactive to light.   Cardiovascular:      Rate and Rhythm: Normal rate and regular rhythm.      Heart sounds: Normal heart sounds. No murmur heard.  Pulmonary:      Effort: Pulmonary effort is normal. No respiratory distress.      Breath sounds: Normal breath sounds.   Skin:     General: Skin is warm and dry.      Capillary Refill: Capillary refill takes less than 2 seconds.   Neurological:      Mental Status: She is alert.             Assessment:       1. Adult ADHD         Plan:           Adult ADHD  -     lisdexamfetamine (VYVANSE) 50 MG capsule; Take 1 capsule (50 mg total) by mouth every morning.  Dispense: 30 capsule; Refill: 0  -     lisdexamfetamine (VYVANSE) 50 MG capsule; Take 1 capsule (50 mg total) by mouth every morning.  Dispense: 30 capsule; Refill: 0  -     lisdexamfetamine (VYVANSE) 50 MG capsule; Take 1 capsule (50 mg total) by mouth every morning.  Dispense: 30 capsule; Refill: 0

## 2024-02-02 LAB
ALBUMIN SERPL-MCNC: 4.4 G/DL (ref 3.9–4.9)
ALBUMIN/GLOB SERPL: 1.8 {RATIO} (ref 1.2–2.2)
ALP SERPL-CCNC: 98 IU/L (ref 44–121)
ALT SERPL-CCNC: 26 IU/L (ref 0–32)
AST SERPL-CCNC: 24 IU/L (ref 0–40)
BILIRUB SERPL-MCNC: 0.4 MG/DL (ref 0–1.2)
BUN SERPL-MCNC: 18 MG/DL (ref 6–20)
BUN/CREAT SERPL: 24 (ref 9–23)
CALCIUM SERPL-MCNC: 9.4 MG/DL (ref 8.7–10.2)
CHLORIDE SERPL-SCNC: 99 MMOL/L (ref 96–106)
CHOLEST SERPL-MCNC: 179 MG/DL (ref 100–199)
CO2 SERPL-SCNC: 22 MMOL/L (ref 20–29)
CREAT SERPL-MCNC: 0.75 MG/DL (ref 0.57–1)
EST. GFR  (NO RACE VARIABLE): 104 ML/MIN/1.73
GLOBULIN SER CALC-MCNC: 2.5 G/DL (ref 1.5–4.5)
GLUCOSE SERPL-MCNC: 84 MG/DL (ref 70–99)
HCV IGG SERPL QL IA: NON REACTIVE
HDLC SERPL-MCNC: 55 MG/DL
HIV 1+2 AB+HIV1 P24 AG SERPL QL IA: NON REACTIVE
LDLC SERPL CALC-MCNC: 111 MG/DL (ref 0–99)
POTASSIUM SERPL-SCNC: 4.3 MMOL/L (ref 3.5–5.2)
PROT SERPL-MCNC: 6.9 G/DL (ref 6–8.5)
SODIUM SERPL-SCNC: 138 MMOL/L (ref 134–144)
TRIGL SERPL-MCNC: 68 MG/DL (ref 0–149)
VLDLC SERPL CALC-MCNC: 13 MG/DL (ref 5–40)

## 2024-04-12 DIAGNOSIS — F90.9 ADULT ADHD: ICD-10-CM

## 2024-04-15 RX ORDER — LISDEXAMFETAMINE DIMESYLATE 50 MG/1
50 CAPSULE ORAL EVERY MORNING
Qty: 30 CAPSULE | Refills: 0 | Status: SHIPPED | OUTPATIENT
Start: 2024-04-15 | End: 2024-05-06 | Stop reason: ALTCHOICE

## 2024-05-06 ENCOUNTER — OFFICE VISIT (OUTPATIENT)
Dept: FAMILY MEDICINE | Facility: CLINIC | Age: 39
End: 2024-05-06
Payer: MEDICAID

## 2024-05-06 VITALS
HEART RATE: 106 BPM | WEIGHT: 140.81 LBS | OXYGEN SATURATION: 98 % | BODY MASS INDEX: 28.39 KG/M2 | HEIGHT: 59 IN | SYSTOLIC BLOOD PRESSURE: 124 MMHG | DIASTOLIC BLOOD PRESSURE: 92 MMHG

## 2024-05-06 DIAGNOSIS — F90.9 ADULT ADHD: Primary | ICD-10-CM

## 2024-05-06 DIAGNOSIS — S16.1XXA ACUTE STRAIN OF NECK MUSCLE, INITIAL ENCOUNTER: ICD-10-CM

## 2024-05-06 PROCEDURE — 3074F SYST BP LT 130 MM HG: CPT | Mod: CPTII,,, | Performed by: FAMILY MEDICINE

## 2024-05-06 PROCEDURE — 99999 PR PBB SHADOW E&M-EST. PATIENT-LVL III: CPT | Mod: PBBFAC,,, | Performed by: FAMILY MEDICINE

## 2024-05-06 PROCEDURE — 3080F DIAST BP >= 90 MM HG: CPT | Mod: CPTII,,, | Performed by: FAMILY MEDICINE

## 2024-05-06 PROCEDURE — 99213 OFFICE O/P EST LOW 20 MIN: CPT | Mod: S$PBB,,, | Performed by: FAMILY MEDICINE

## 2024-05-06 PROCEDURE — 99213 OFFICE O/P EST LOW 20 MIN: CPT | Mod: PBBFAC,PN | Performed by: FAMILY MEDICINE

## 2024-05-06 PROCEDURE — 3008F BODY MASS INDEX DOCD: CPT | Mod: CPTII,,, | Performed by: FAMILY MEDICINE

## 2024-05-06 PROCEDURE — 1159F MED LIST DOCD IN RCRD: CPT | Mod: CPTII,,, | Performed by: FAMILY MEDICINE

## 2024-05-06 RX ORDER — LISDEXAMFETAMINE DIMESYLATE 60 MG/1
60 CAPSULE ORAL EVERY MORNING
Qty: 30 CAPSULE | Refills: 0 | Status: SHIPPED | OUTPATIENT
Start: 2024-07-15

## 2024-05-06 RX ORDER — GABAPENTIN 300 MG/1
300 CAPSULE ORAL NIGHTLY
Qty: 30 CAPSULE | Refills: 0 | Status: SHIPPED | OUTPATIENT
Start: 2024-05-06 | End: 2024-06-13

## 2024-05-06 RX ORDER — LISDEXAMFETAMINE DIMESYLATE 60 MG/1
60 CAPSULE ORAL EVERY MORNING
Qty: 30 CAPSULE | Refills: 0 | Status: SHIPPED | OUTPATIENT
Start: 2024-06-15

## 2024-05-06 RX ORDER — LISDEXAMFETAMINE DIMESYLATE 60 MG/1
60 CAPSULE ORAL EVERY MORNING
Qty: 30 CAPSULE | Refills: 0 | Status: SHIPPED | OUTPATIENT
Start: 2024-05-15

## 2024-05-06 NOTE — PROGRESS NOTES
"  SUBJECTIVE:    Patient ID: Iris Blanchard is a 39 y.o. female.    Chief Complaint: ADHD  38 yo female new to this provider here today for refills on her ADD/ADHD medications.The patient is here today to follow up on attention deficit hyperactivity disorder. Three months ago, and was prescribed Lisdexamphetamine 50mg pt does not think it is doing as well as the adderall, we discussed increasing her medications. .     I reviewed her overdue health maintenance, patient is due for hepatitis-C screening, COVID-19 vaccine, pneumococcal vaccine, HIV screening, cervical cancer screening.     Significant past medical Hx  ADD: Vyvanse 50mg   HSV: Valtrex 500mg  Acne: Clindamycin 1%, Doxycycline 100mg  Contraception: Mirena     Specialists:  Ortho: Dr Yu  GYN: Dr Garrido  Derm: Dr Jones     Smoke: 1/2 ppd  ETOH: None  Exercise: walking the dog    HPI    ADHD Adult: On Medications  Have difficulty sustaining attention in tasks or fun activities?  no  Don't follow through on instructions and fail to finish work?  no  Have difficulty organizing tasks and activities?  no  Avoid, dislike, or are reluctant to engage in work thar requires sustained mental effort?  no  Easily distracted?  no  Forgetful in daily activities?  no  Fidget with hands or feet, or squirm in seat?  no  Have difficulty engaging in leisure activities or doing fun things quietly?  no  Feel "on the go" or "driven by a motor"?  no  Blurt out answers before questions have been completed?  no  Have difficulty waiting your turn, are impatient?  no  Interrupt or intrude on others?  no  Past Medical History:   Diagnosis Date    ADHD (attention deficit hyperactivity disorder)     Anxiety     Closed dislocation of right patella 09/24/2021     Social History     Socioeconomic History    Marital status: Single   Occupational History    Occupation: unemployed   Tobacco Use    Smoking status: Every Day     Current packs/day: 0.50     Types: Cigarettes     Passive " exposure: Never    Smokeless tobacco: Never   Substance and Sexual Activity    Alcohol use: No    Drug use: No    Sexual activity: Yes     Partners: Female     Birth control/protection: I.U.D.     Social Determinants of Health     Stress: No Stress Concern Present (11/19/2019)    [facility] [place] [facility] - Occupational Stress Questionnaire     Feeling of Stress : Not at all     Past Surgical History:   Procedure Laterality Date    EYE SURGERY       Family History   Problem Relation Name Age of Onset    Hypertension Mother      Diabetes Father      Ovarian cancer Sister       Current Outpatient Medications   Medication Sig Dispense Refill    gabapentin (NEURONTIN) 300 MG capsule Take 1 capsule (300 mg total) by mouth every evening. 30 capsule 0    [START ON 5/15/2024] lisdexamfetamine (VYVANSE) 60 MG capsule Take 1 capsule (60 mg total) by mouth every morning. 30 capsule 0    [START ON 6/15/2024] lisdexamfetamine (VYVANSE) 60 MG capsule Take 1 capsule (60 mg total) by mouth every morning. 30 capsule 0    [START ON 7/15/2024] lisdexamfetamine (VYVANSE) 60 MG capsule Take 1 capsule (60 mg total) by mouth every morning. 30 capsule 0     No current facility-administered medications for this visit.     Review of patient's allergies indicates:  No Known Allergies    Review of Systems   Constitutional:  Negative for activity change, diaphoresis, fatigue and unexpected weight change.   HENT:  Negative for congestion, hearing loss, rhinorrhea and trouble swallowing.    Eyes:  Negative for discharge and visual disturbance.   Respiratory:  Negative for cough, chest tightness, shortness of breath and wheezing.    Cardiovascular:  Negative for chest pain and palpitations.   Gastrointestinal:  Negative for blood in stool, constipation, diarrhea and vomiting.   Endocrine: Negative for polydipsia and polyuria.   Genitourinary:  Negative for difficulty urinating, dysuria, hematuria and menstrual problem.  "  Musculoskeletal:  Negative for arthralgias, joint swelling and neck pain.   Neurological:  Negative for weakness and headaches.   Psychiatric/Behavioral:  Negative for confusion and dysphoric mood.           Blood pressure (!) 124/92, pulse 106, height 4' 11" (1.499 m), weight 63.9 kg (140 lb 12.8 oz), SpO2 98%. Body mass index is 28.44 kg/m².   Objective:      Physical Exam  Vitals reviewed.   Constitutional:       General: She is not in acute distress.     Appearance: Normal appearance. She is well-developed. She is not ill-appearing or toxic-appearing.   HENT:      Head: Normocephalic and atraumatic.      Right Ear: External ear normal.      Left Ear: External ear normal.   Eyes:      General:         Right eye: No discharge.         Left eye: No discharge.      Conjunctiva/sclera: Conjunctivae normal.      Pupils: Pupils are equal, round, and reactive to light.   Cardiovascular:      Rate and Rhythm: Normal rate and regular rhythm.      Heart sounds: Normal heart sounds. No murmur heard.  Pulmonary:      Effort: Pulmonary effort is normal. No respiratory distress.      Breath sounds: Normal breath sounds.   Skin:     General: Skin is warm and dry.      Capillary Refill: Capillary refill takes less than 2 seconds.   Neurological:      Mental Status: She is alert.             Assessment:       1. Adult ADHD    2. Acute strain of neck muscle, initial encounter         Plan:           Adult ADHD  -     lisdexamfetamine (VYVANSE) 60 MG capsule; Take 1 capsule (60 mg total) by mouth every morning.  Dispense: 30 capsule; Refill: 0  -     lisdexamfetamine (VYVANSE) 60 MG capsule; Take 1 capsule (60 mg total) by mouth every morning.  Dispense: 30 capsule; Refill: 0  -     lisdexamfetamine (VYVANSE) 60 MG capsule; Take 1 capsule (60 mg total) by mouth every morning.  Dispense: 30 capsule; Refill: 0    Acute strain of neck muscle, initial encounter  -     gabapentin (NEURONTIN) 300 MG capsule; Take 1 capsule (300 mg " total) by mouth every evening.  Dispense: 30 capsule; Refill: 0

## 2024-05-27 ENCOUNTER — PATIENT MESSAGE (OUTPATIENT)
Dept: ADMINISTRATIVE | Facility: HOSPITAL | Age: 39
End: 2024-05-27
Payer: MEDICAID

## 2024-06-13 DIAGNOSIS — S16.1XXA ACUTE STRAIN OF NECK MUSCLE, INITIAL ENCOUNTER: ICD-10-CM

## 2024-06-13 RX ORDER — GABAPENTIN 300 MG/1
300 CAPSULE ORAL NIGHTLY
Qty: 30 CAPSULE | Refills: 0 | Status: SHIPPED | OUTPATIENT
Start: 2024-06-13

## 2024-07-14 DIAGNOSIS — S16.1XXA ACUTE STRAIN OF NECK MUSCLE, INITIAL ENCOUNTER: ICD-10-CM

## 2024-07-15 RX ORDER — GABAPENTIN 300 MG/1
300 CAPSULE ORAL NIGHTLY
Qty: 30 CAPSULE | Refills: 0 | Status: SHIPPED | OUTPATIENT
Start: 2024-07-15

## 2024-08-06 ENCOUNTER — OFFICE VISIT (OUTPATIENT)
Dept: FAMILY MEDICINE | Facility: CLINIC | Age: 39
End: 2024-08-06
Payer: MEDICAID

## 2024-08-06 VITALS
DIASTOLIC BLOOD PRESSURE: 83 MMHG | BODY MASS INDEX: 27.52 KG/M2 | OXYGEN SATURATION: 100 % | HEIGHT: 59 IN | WEIGHT: 136.5 LBS | SYSTOLIC BLOOD PRESSURE: 118 MMHG | HEART RATE: 86 BPM

## 2024-08-06 DIAGNOSIS — Z01.419 WELL WOMAN EXAM: ICD-10-CM

## 2024-08-06 DIAGNOSIS — I10 PRIMARY HYPERTENSION: ICD-10-CM

## 2024-08-06 DIAGNOSIS — F90.9 ADULT ADHD: Primary | ICD-10-CM

## 2024-08-06 PROCEDURE — 3074F SYST BP LT 130 MM HG: CPT | Mod: CPTII,,, | Performed by: FAMILY MEDICINE

## 2024-08-06 PROCEDURE — 4010F ACE/ARB THERAPY RXD/TAKEN: CPT | Mod: CPTII,,, | Performed by: FAMILY MEDICINE

## 2024-08-06 PROCEDURE — 1159F MED LIST DOCD IN RCRD: CPT | Mod: CPTII,,, | Performed by: FAMILY MEDICINE

## 2024-08-06 PROCEDURE — 3079F DIAST BP 80-89 MM HG: CPT | Mod: CPTII,,, | Performed by: FAMILY MEDICINE

## 2024-08-06 PROCEDURE — 99999 PR PBB SHADOW E&M-EST. PATIENT-LVL III: CPT | Mod: PBBFAC,,, | Performed by: FAMILY MEDICINE

## 2024-08-06 PROCEDURE — 99214 OFFICE O/P EST MOD 30 MIN: CPT | Mod: S$PBB,,, | Performed by: FAMILY MEDICINE

## 2024-08-06 PROCEDURE — 3008F BODY MASS INDEX DOCD: CPT | Mod: CPTII,,, | Performed by: FAMILY MEDICINE

## 2024-08-06 PROCEDURE — 99213 OFFICE O/P EST LOW 20 MIN: CPT | Mod: PBBFAC,PN | Performed by: FAMILY MEDICINE

## 2024-08-06 RX ORDER — LISDEXAMFETAMINE DIMESYLATE 60 MG/1
60 CAPSULE ORAL EVERY MORNING
Qty: 30 CAPSULE | Refills: 0 | Status: SHIPPED | OUTPATIENT
Start: 2024-10-23

## 2024-08-06 RX ORDER — LISDEXAMFETAMINE DIMESYLATE 60 MG/1
60 CAPSULE ORAL EVERY MORNING
Qty: 30 CAPSULE | Refills: 0 | Status: SHIPPED | OUTPATIENT
Start: 2024-09-23

## 2024-08-06 RX ORDER — OLMESARTAN MEDOXOMIL 20 MG/1
20 TABLET ORAL DAILY
Qty: 90 TABLET | Refills: 3 | Status: SHIPPED | OUTPATIENT
Start: 2024-08-06 | End: 2025-08-06

## 2024-08-06 RX ORDER — LISDEXAMFETAMINE DIMESYLATE 60 MG/1
60 CAPSULE ORAL EVERY MORNING
Qty: 30 CAPSULE | Refills: 0 | Status: SHIPPED | OUTPATIENT
Start: 2024-08-23

## 2024-08-16 DIAGNOSIS — S16.1XXA ACUTE STRAIN OF NECK MUSCLE, INITIAL ENCOUNTER: ICD-10-CM

## 2024-08-17 RX ORDER — GABAPENTIN 300 MG/1
300 CAPSULE ORAL NIGHTLY
Qty: 30 CAPSULE | Refills: 0 | Status: SHIPPED | OUTPATIENT
Start: 2024-08-17

## 2024-08-20 LAB
HUMAN PAPILLOMAVIRUS (HPV): NORMAL
PAP RECOMMENDATION EXT: NORMAL
PAP SMEAR: NORMAL

## 2024-08-27 ENCOUNTER — PATIENT OUTREACH (OUTPATIENT)
Dept: ADMINISTRATIVE | Facility: HOSPITAL | Age: 39
End: 2024-08-27
Payer: MEDICAID

## 2024-11-05 ENCOUNTER — OFFICE VISIT (OUTPATIENT)
Dept: FAMILY MEDICINE | Facility: CLINIC | Age: 39
End: 2024-11-05
Payer: MEDICAID

## 2024-11-05 VITALS
DIASTOLIC BLOOD PRESSURE: 89 MMHG | BODY MASS INDEX: 28.3 KG/M2 | SYSTOLIC BLOOD PRESSURE: 136 MMHG | HEIGHT: 59 IN | OXYGEN SATURATION: 97 % | HEART RATE: 96 BPM | WEIGHT: 140.38 LBS

## 2024-11-05 DIAGNOSIS — M54.2 CERVICALGIA: ICD-10-CM

## 2024-11-05 DIAGNOSIS — Z23 INFLUENZA VACCINE NEEDED: Primary | ICD-10-CM

## 2024-11-05 DIAGNOSIS — F90.9 ADULT ADHD: ICD-10-CM

## 2024-11-05 PROCEDURE — 99213 OFFICE O/P EST LOW 20 MIN: CPT | Mod: S$PBB,,, | Performed by: FAMILY MEDICINE

## 2024-11-05 PROCEDURE — 3079F DIAST BP 80-89 MM HG: CPT | Mod: CPTII,,, | Performed by: FAMILY MEDICINE

## 2024-11-05 PROCEDURE — 90471 IMMUNIZATION ADMIN: CPT | Mod: PBBFAC,PN

## 2024-11-05 PROCEDURE — 4010F ACE/ARB THERAPY RXD/TAKEN: CPT | Mod: CPTII,,, | Performed by: FAMILY MEDICINE

## 2024-11-05 PROCEDURE — 3008F BODY MASS INDEX DOCD: CPT | Mod: CPTII,,, | Performed by: FAMILY MEDICINE

## 2024-11-05 PROCEDURE — 1159F MED LIST DOCD IN RCRD: CPT | Mod: CPTII,,, | Performed by: FAMILY MEDICINE

## 2024-11-05 PROCEDURE — 99999PBSHW PR PBB SHADOW TECHNICAL ONLY FILED TO HB: Mod: PBBFAC,,,

## 2024-11-05 PROCEDURE — 99999 PR PBB SHADOW E&M-EST. PATIENT-LVL III: CPT | Mod: PBBFAC,,, | Performed by: FAMILY MEDICINE

## 2024-11-05 PROCEDURE — 99213 OFFICE O/P EST LOW 20 MIN: CPT | Mod: PBBFAC,PN | Performed by: FAMILY MEDICINE

## 2024-11-05 PROCEDURE — 3075F SYST BP GE 130 - 139MM HG: CPT | Mod: CPTII,,, | Performed by: FAMILY MEDICINE

## 2024-11-05 PROCEDURE — 90656 IIV3 VACC NO PRSV 0.5 ML IM: CPT | Mod: PBBFAC,PN

## 2024-11-05 RX ORDER — LISDEXAMFETAMINE DIMESYLATE 60 MG/1
60 CAPSULE ORAL EVERY MORNING
Qty: 30 CAPSULE | Refills: 0 | Status: SHIPPED | OUTPATIENT
Start: 2025-01-25

## 2024-11-05 RX ORDER — FLUCONAZOLE 150 MG/1
150 TABLET ORAL ONCE
COMMUNITY
Start: 2024-10-11

## 2024-11-05 RX ORDER — VALACYCLOVIR HYDROCHLORIDE 1 G/1
1000 TABLET, FILM COATED ORAL
COMMUNITY
Start: 2024-08-20

## 2024-11-05 RX ORDER — LISDEXAMFETAMINE DIMESYLATE 60 MG/1
60 CAPSULE ORAL EVERY MORNING
Qty: 30 CAPSULE | Refills: 0 | Status: SHIPPED | OUTPATIENT
Start: 2024-12-25

## 2024-11-05 RX ORDER — LISDEXAMFETAMINE DIMESYLATE 60 MG/1
60 CAPSULE ORAL EVERY MORNING
Qty: 30 CAPSULE | Refills: 0 | Status: SHIPPED | OUTPATIENT
Start: 2024-11-25

## 2024-11-05 RX ORDER — GABAPENTIN 300 MG/1
300 CAPSULE ORAL NIGHTLY
Qty: 30 CAPSULE | Refills: 0 | Status: SHIPPED | OUTPATIENT
Start: 2024-11-05

## 2024-11-05 RX ADMIN — INFLUENZA VIRUS VACCINE 0.5 ML: 15; 15; 15 SUSPENSION INTRAMUSCULAR at 10:11

## 2024-11-05 NOTE — PROGRESS NOTES
"  SUBJECTIVE:    Patient ID: Iris Blanchard is a 39 y.o. female.    Chief Complaint: ADHD  38 yo female new to this provider here today for refills on her ADD/ADHD medications.The patient is here today to follow up on attention deficit hyperactivity disorder. Three months ago, and was prescribed Lisdexamphetamine 60mg.     I reviewed her overdue health maintenance, patient is due for hepatitis-C screening, COVID-19 vaccine, pneumococcal vaccine, HIV screening, cervical cancer screening.     Significant past medical Hx  ADD: Vyvanse 50mg   HSV: Valtrex 500mg  Acne: Clindamycin 1%, Doxycycline 100mg  Contraception: Mirena     Specialists:  Ortho: Dr Yu  GYN: Dr Garrido  Derm: Dr Jones     Smoke: 1/2 ppd  ETOH: None  Exercise: walking the dog     ADHD Adult: On Medications  Have difficulty sustaining attention in tasks or fun activities?  no  Don't follow through on instructions and fail to finish work?  no  Have difficulty organizing tasks and activities?  no  Avoid, dislike, or are reluctant to engage in work thar requires sustained mental effort?  no  Easily distracted?  no  Forgetful in daily activities?  no  Fidget with hands or feet, or squirm in seat?  no  Have difficulty engaging in leisure activities or doing fun things quietly?  no  Feel "on the go" or "driven by a motor"?  no  Blurt out answers before questions have been completed?  no  Have difficulty waiting your turn, are impatient?  no  Interrupt or intrude on others?  no  History of Present Illness            Review of Systems   Constitutional:  Negative for activity change, diaphoresis, fatigue and unexpected weight change.   HENT:  Negative for congestion, hearing loss, rhinorrhea and trouble swallowing.    Eyes:  Negative for discharge and visual disturbance.   Respiratory:  Negative for cough, chest tightness, shortness of breath and wheezing.    Cardiovascular:  Negative for chest pain and palpitations.   Gastrointestinal:  Negative for " blood in stool, constipation, diarrhea and vomiting.   Endocrine: Negative for polydipsia and polyuria.   Genitourinary:  Negative for difficulty urinating, dysuria, hematuria and menstrual problem.   Musculoskeletal:  Negative for arthralgias, joint swelling and neck pain.   Neurological:  Negative for weakness and headaches.   Psychiatric/Behavioral:  Negative for confusion and dysphoric mood.        Past Medical History:   Diagnosis Date    ADHD (attention deficit hyperactivity disorder)     Anxiety     Closed dislocation of right patella 09/24/2021     Social History     Socioeconomic History    Marital status: Single   Occupational History    Occupation: unemployed   Tobacco Use    Smoking status: Every Day     Current packs/day: 0.50     Types: Cigarettes     Passive exposure: Never    Smokeless tobacco: Never   Substance and Sexual Activity    Alcohol use: No    Drug use: No    Sexual activity: Yes     Partners: Female     Birth control/protection: I.U.D.     Social Drivers of Health     Stress: No Stress Concern Present (11/19/2019)    [facility] - Occupational Stress Questionnaire     Feeling of Stress : Not at all     Past Surgical History:   Procedure Laterality Date    EYE SURGERY       Family History   Problem Relation Name Age of Onset    Hypertension Mother      Diabetes Father      Ovarian cancer Sister       Current Outpatient Medications   Medication Sig Dispense Refill    fluconazole (DIFLUCAN) 150 MG Tab Take 150 mg by mouth once.      valACYclovir (VALTREX) 1000 MG tablet Take 1,000 mg by mouth.      gabapentin (NEURONTIN) 300 MG capsule Take 1 capsule (300 mg total) by mouth every evening. 30 capsule 0    [START ON 1/25/2025] lisdexamfetamine (VYVANSE) 60 MG capsule Take 1 capsule (60 mg total) by mouth every morning. 30 capsule 0    [START ON 12/25/2024] lisdexamfetamine (VYVANSE) 60 MG capsule Take 1 capsule (60 mg total) by mouth every morning. 30 capsule 0     "[START ON 11/25/2024] lisdexamfetamine (VYVANSE) 60 MG capsule Take 1 capsule (60 mg total) by mouth every morning. 30 capsule 0     No current facility-administered medications for this visit.     Review of patient's allergies indicates:  No Known Allergies    Blood pressure 136/89, pulse 96, height 4' 11" (1.499 m), weight 63.7 kg (140 lb 6.4 oz), SpO2 97%. Body mass index is 28.36 kg/m².   Objective:      Physical Exam  Vitals (hypertensive asymptomatic) reviewed.   Constitutional:       General: She is not in acute distress.     Appearance: Normal appearance. She is not ill-appearing or toxic-appearing.   HENT:      Head: Normocephalic and atraumatic.   Cardiovascular:      Rate and Rhythm: Normal rate and regular rhythm.      Heart sounds: No murmur heard.  Pulmonary:      Effort: Pulmonary effort is normal. No respiratory distress.      Breath sounds: Normal breath sounds. No wheezing or rhonchi.   Skin:     Capillary Refill: Capillary refill takes less than 2 seconds.   Neurological:      Mental Status: She is alert and oriented to person, place, and time.       Assessment:       Physical Exam             Plan:           Influenza vaccine needed  -     influenza (Flulaval, Fluzone, Fluarix) 45 mcg/0.5 mL IM vaccine (> or = 6 mo) 0.5 mL    Adult ADHD  -     lisdexamfetamine (VYVANSE) 60 MG capsule; Take 1 capsule (60 mg total) by mouth every morning.  Dispense: 30 capsule; Refill: 0  -     lisdexamfetamine (VYVANSE) 60 MG capsule; Take 1 capsule (60 mg total) by mouth every morning.  Dispense: 30 capsule; Refill: 0  -     lisdexamfetamine (VYVANSE) 60 MG capsule; Take 1 capsule (60 mg total) by mouth every morning.  Dispense: 30 capsule; Refill: 0    ADHD medication refilled after  reviewed and no red flags noted. Patient reports improvement in symptoms with no significant side effects.     I explained the side effects of ADHD stimulants including emotional lability, seizures, arrhythmias, palpitations, " hypertension, dizziness, syncope, appetite changes, weight loss, insomnia, addiction/dependency, elevated heart rate, headaches, anxiety/agitation, and worsening of underlying psychiatric conditions. Patient denies having history of cardiac disease. She denies chest pain upon exertion, dyspnea, nausea, vomiting, diaphoresis, and syncope. While ADHD medications do carry risk of arrhythmias, hypertension, neurological, and psychiatric complications, I feel that the benefits exceed the risks in this patient. A discussion was made going over the risks and benefits of the medication and after shared decision making they decided to continue their medication. They understood the choice, were able to express a  choice, appreciated the risks associated with it, and they had logical reasoning for their choice and demonstrated capacity.  reviewed and consistent with medication use as prescribed. The patient was cautioned to go to the emergency room for chest pain, severe headache, fainting, etc.  Patient was told to report any side effects or any symptoms to me immediately and they agreed to this plan. All questions were answered.          Cervicalgia  -     gabapentin (NEURONTIN) 300 MG capsule; Take 1 capsule (300 mg total) by mouth every evening.  Dispense: 30 capsule; Refill: 0

## 2024-11-05 NOTE — PROGRESS NOTES
"  SUBJECTIVE:    Patient ID: Iris Blanchard is a 39 y.o. female.    Chief Complaint: No chief complaint on file.  40 yo female new to this provider here today for refills on her ADD/ADHD medications.The patient is here today to follow up on attention deficit hyperactivity disorder. Three months ago, and was prescribed Lisdexamphetamine 50mg pt does not think it is doing as well as the adderall, we discussed increasing her medications. .     I reviewed her overdue health maintenance, patient is due for hepatitis-C screening, COVID-19 vaccine, pneumococcal vaccine, HIV screening, cervical cancer screening.     Significant past medical Hx  ADD: Vyvanse 50mg   HSV: Valtrex 500mg  Acne: Clindamycin 1%, Doxycycline 100mg  Contraception: Mirena     Specialists:  Ortho: Dr Yu  GYN: Dr Garrido  Derm: Dr Jones     Smoke: 1/2 ppd  ETOH: None  Exercise: walking the dog     HPI    ADHD Adult: On Medications  Have difficulty sustaining attention in tasks or fun activities?  no  Don't follow through on instructions and fail to finish work?  no  Have difficulty organizing tasks and activities?  no  Avoid, dislike, or are reluctant to engage in work thar requires sustained mental effort?  no  Easily distracted?  no  Forgetful in daily activities?  no  Fidget with hands or feet, or squirm in seat?  no  Have difficulty engaging in leisure activities or doing fun things quietly?  no  Feel "on the go" or "driven by a motor"?  no  Blurt out answers before questions have been completed?  no  Have difficulty waiting your turn, are impatient?  no  Interrupt or intrude on others?  no  Past Medical History:   Diagnosis Date    ADHD (attention deficit hyperactivity disorder)     Anxiety     Closed dislocation of right patella 09/24/2021     Social History     Socioeconomic History    Marital status: Single   Occupational History    Occupation: unemployed   Tobacco Use    Smoking status: Every Day     Current packs/day: 0.50     Types: " Cigarettes     Passive exposure: Never    Smokeless tobacco: Never   Substance and Sexual Activity    Alcohol use: No    Drug use: No    Sexual activity: Yes     Partners: Female     Birth control/protection: I.U.D.     Social Drivers of Health     Stress: No Stress Concern Present (11/19/2019)    [facility] [place] of [facility]ational [facility] - Occupational Stress Questionnaire     Feeling of Stress : Not at all     Past Surgical History:   Procedure Laterality Date    EYE SURGERY       Family History   Problem Relation Name Age of Onset    Hypertension Mother      Diabetes Father      Ovarian cancer Sister       Current Outpatient Medications   Medication Sig Dispense Refill    gabapentin (NEURONTIN) 300 MG capsule TAKE 1 CAPSULE(300 MG) BY MOUTH EVERY EVENING 30 capsule 0    lisdexamfetamine (VYVANSE) 60 MG capsule Take 1 capsule (60 mg total) by mouth every morning. 30 capsule 0    lisdexamfetamine (VYVANSE) 60 MG capsule Take 1 capsule (60 mg total) by mouth every morning. 30 capsule 0    lisdexamfetamine (VYVANSE) 60 MG capsule Take 1 capsule (60 mg total) by mouth every morning. 30 capsule 0    olmesartan (BENICAR) 20 MG tablet Take 1 tablet (20 mg total) by mouth once daily. 90 tablet 3     No current facility-administered medications for this visit.     Review of patient's allergies indicates:  No Known Allergies    Review of Systems       There were no vitals taken for this visit. There is no height or weight on file to calculate BMI.   Objective:      Physical Exam    Assessment:       No diagnosis found.     Plan:           There are no diagnoses linked to this encounter.

## 2025-01-22 DIAGNOSIS — M54.2 CERVICALGIA: ICD-10-CM

## 2025-01-23 RX ORDER — GABAPENTIN 300 MG/1
300 CAPSULE ORAL NIGHTLY
Qty: 30 CAPSULE | Refills: 0 | Status: SHIPPED | OUTPATIENT
Start: 2025-01-23

## 2025-02-06 ENCOUNTER — OFFICE VISIT (OUTPATIENT)
Dept: FAMILY MEDICINE | Facility: CLINIC | Age: 40
End: 2025-02-06
Payer: MEDICAID

## 2025-02-06 VITALS
OXYGEN SATURATION: 98 % | DIASTOLIC BLOOD PRESSURE: 87 MMHG | WEIGHT: 141 LBS | HEIGHT: 59 IN | BODY MASS INDEX: 28.43 KG/M2 | SYSTOLIC BLOOD PRESSURE: 118 MMHG | HEART RATE: 83 BPM

## 2025-02-06 DIAGNOSIS — M54.2 CERVICALGIA: ICD-10-CM

## 2025-02-06 DIAGNOSIS — F90.9 ADULT ADHD: Primary | ICD-10-CM

## 2025-02-06 PROCEDURE — 3074F SYST BP LT 130 MM HG: CPT | Mod: CPTII,,, | Performed by: FAMILY MEDICINE

## 2025-02-06 PROCEDURE — 99999 PR PBB SHADOW E&M-EST. PATIENT-LVL III: CPT | Mod: PBBFAC,,, | Performed by: FAMILY MEDICINE

## 2025-02-06 PROCEDURE — 1159F MED LIST DOCD IN RCRD: CPT | Mod: CPTII,,, | Performed by: FAMILY MEDICINE

## 2025-02-06 PROCEDURE — 3079F DIAST BP 80-89 MM HG: CPT | Mod: CPTII,,, | Performed by: FAMILY MEDICINE

## 2025-02-06 PROCEDURE — 99213 OFFICE O/P EST LOW 20 MIN: CPT | Mod: PBBFAC,PN | Performed by: FAMILY MEDICINE

## 2025-02-06 PROCEDURE — 3008F BODY MASS INDEX DOCD: CPT | Mod: CPTII,,, | Performed by: FAMILY MEDICINE

## 2025-02-06 PROCEDURE — 99213 OFFICE O/P EST LOW 20 MIN: CPT | Mod: S$PBB,,, | Performed by: FAMILY MEDICINE

## 2025-02-06 RX ORDER — GABAPENTIN 300 MG/1
300 CAPSULE ORAL NIGHTLY
Qty: 90 CAPSULE | Refills: 1 | Status: SHIPPED | OUTPATIENT
Start: 2025-02-26 | End: 2025-02-27

## 2025-02-06 RX ORDER — LISDEXAMFETAMINE DIMESYLATE 60 MG/1
60 CAPSULE ORAL EVERY MORNING
Qty: 30 CAPSULE | Refills: 0 | Status: SHIPPED | OUTPATIENT
Start: 2025-04-27

## 2025-02-06 RX ORDER — LISDEXAMFETAMINE DIMESYLATE 60 MG/1
60 CAPSULE ORAL EVERY MORNING
Qty: 30 CAPSULE | Refills: 0 | Status: SHIPPED | OUTPATIENT
Start: 2025-02-27

## 2025-02-06 RX ORDER — LISDEXAMFETAMINE DIMESYLATE 60 MG/1
60 CAPSULE ORAL EVERY MORNING
Qty: 30 CAPSULE | Refills: 0 | Status: SHIPPED | OUTPATIENT
Start: 2025-03-27

## 2025-02-06 NOTE — PROGRESS NOTES
"  SUBJECTIVE:    Patient ID: Iris Blanchard is a 40 y.o. female.    Chief Complaint: ADHD  39 yo female new to this provider here today for refills on her ADD/ADHD medications.The patient is here today to follow up on attention deficit hyperactivity disorder. Three months ago, and was prescribed Lisdexamphetamine 60mg.     I reviewed her overdue health maintenance, patient is due for hepatitis-C screening, COVID-19 vaccine, pneumococcal vaccine, HIV screening, cervical cancer screening.     Significant past medical Hx  ADD: Vyvanse 60mg   HSV: Valtrex 500mg  Acne: Clindamycin 1%, Doxycycline 100mg  Contraception: Mirena     Specialists:  Ortho: Dr Yu  GYN: Dr Garrido  Derm: Dr Jones     Smoke: 1/2 ppd  ETOH: None  Exercise: walking the dog    HPI    ADHD Adult: On Medications  Have difficulty sustaining attention in tasks or fun activities?  no  Don't follow through on instructions and fail to finish work?  no  Have difficulty organizing tasks and activities?  no  Avoid, dislike, or are reluctant to engage in work thar requires sustained mental effort?  no  Easily distracted?  no  Forgetful in daily activities?  no  Fidget with hands or feet, or squirm in seat?  no  Have difficulty engaging in leisure activities or doing fun things quietly?  no  Feel "on the go" or "driven by a motor"?  no  Blurt out answers before questions have been completed?  no  Have difficulty waiting your turn, are impatient?  no  Interrupt or intrude on others?  no  History of Present Illness        Past Medical History:   Diagnosis Date    ADHD (attention deficit hyperactivity disorder)     Anxiety     Closed dislocation of right patella 09/24/2021     Social History     Socioeconomic History    Marital status: Single   Occupational History    Occupation: unemployed   Tobacco Use    Smoking status: Every Day     Current packs/day: 0.50     Types: Cigarettes     Passive exposure: Never    Smokeless tobacco: Never   Substance and " "Sexual Activity    Alcohol use: No    Drug use: No    Sexual activity: Yes     Partners: Female     Birth control/protection: I.U.D.     Social Drivers of Health     Stress: No Stress Concern Present (11/19/2019)    [employer] - Occupational Stress Questionnaire     Feeling of Stress : Not at all     Past Surgical History:   Procedure Laterality Date    EYE SURGERY       Family History   Problem Relation Name Age of Onset    Hypertension Mother      Diabetes Father      Ovarian cancer Sister       Current Outpatient Medications   Medication Sig Dispense Refill    valACYclovir (VALTREX) 1000 MG tablet Take 1,000 mg by mouth.      [START ON 2/26/2025] gabapentin (NEURONTIN) 300 MG capsule Take 1 capsule (300 mg total) by mouth every evening. 90 capsule 1    [START ON 4/27/2025] lisdexamfetamine (VYVANSE) 60 MG capsule Take 1 capsule (60 mg total) by mouth every morning. 30 capsule 0    [START ON 3/27/2025] lisdexamfetamine (VYVANSE) 60 MG capsule Take 1 capsule (60 mg total) by mouth every morning. 30 capsule 0    [START ON 2/27/2025] lisdexamfetamine (VYVANSE) 60 MG capsule Take 1 capsule (60 mg total) by mouth every morning. 30 capsule 0     No current facility-administered medications for this visit.     Review of patient's allergies indicates:  No Known Allergies    Review of Systems       Blood pressure 118/87, pulse 83, height 4' 11" (1.499 m), weight 64 kg (141 lb), SpO2 98%. Body mass index is 28.48 kg/m².   Objective:      Physical Exam    Assessment:       1. Adult ADHD    2. Cervicalgia         Plan:           Adult ADHD  -     lisdexamfetamine (VYVANSE) 60 MG capsule; Take 1 capsule (60 mg total) by mouth every morning.  Dispense: 30 capsule; Refill: 0  -     lisdexamfetamine (VYVANSE) 60 MG capsule; Take 1 capsule (60 mg total) by mouth every morning.  Dispense: 30 capsule; Refill: 0  -     lisdexamfetamine (VYVANSE) 60 MG capsule; Take 1 capsule (60 mg total) by mouth every " morning.  Dispense: 30 capsule; Refill: 0  ADHD medication refilled after  reviewed and no red flags noted. Patient reports improvement in symptoms with no significant side effects.     I explained the side effects of ADHD stimulants including emotional lability, seizures, arrhythmias, palpitations, hypertension, dizziness, syncope, appetite changes, weight loss, insomnia, addiction/dependency, elevated heart rate, headaches, anxiety/agitation, and worsening of underlying psychiatric conditions. Patient denies having history of cardiac disease. She denies chest pain upon exertion, dyspnea, nausea, vomiting, diaphoresis, and syncope. While ADHD medications do carry risk of arrhythmias, hypertension, neurological, and psychiatric complications, I feel that the benefits exceed the risks in this patient. A discussion was made going over the risks and benefits of the medication and after shared decision making they decided to continue their medication. They understood the choice, were able to express a  choice, appreciated the risks associated with it, and they had logical reasoning for their choice and demonstrated capacity.  reviewed and consistent with medication use as prescribed. The patient was cautioned to go to the emergency room for chest pain, severe headache, fainting, etc.  Patient was told to report any side effects or any symptoms to me immediately and they agreed to this plan. All questions were answered.          Cervicalgia  -     gabapentin (NEURONTIN) 300 MG capsule; Take 1 capsule (300 mg total) by mouth every evening.  Dispense: 90 capsule; Refill: 1

## 2025-02-12 ENCOUNTER — TELEPHONE (OUTPATIENT)
Dept: FAMILY MEDICINE | Facility: CLINIC | Age: 40
End: 2025-02-12
Payer: MEDICAID

## 2025-02-12 NOTE — TELEPHONE ENCOUNTER
Gabapentin [facility] closed    Closed   1/24/2025  9:09 AM  Close reason: Prior Authorization not required for patient/medication   Note from payer: If this claim were submitted today by the pharmacy, it would reject for Early Refill. The dispensing pharmacy can contact our pharmacy help desk at [phone] for assistance with an override, if needed.   Payer: [employer] - Medicaid Case ID: [id number]  Waiting for Payer Response   1/24/2025  9:09 AM

## 2025-02-25 DIAGNOSIS — M54.2 CERVICALGIA: ICD-10-CM

## 2025-02-25 NOTE — TELEPHONE ENCOUNTER
No care due was identified.  [facility] Embedded Care Due Messages. Reference number: [id number].   2/25/2025 3:29:20 AM CST

## 2025-02-27 RX ORDER — GABAPENTIN 300 MG/1
300 CAPSULE ORAL NIGHTLY
Qty: 30 CAPSULE | Refills: 0 | Status: SHIPPED | OUTPATIENT
Start: 2025-02-27

## 2025-04-04 ENCOUNTER — PATIENT MESSAGE (OUTPATIENT)
Dept: FAMILY MEDICINE | Facility: CLINIC | Age: 40
End: 2025-04-04
Payer: MEDICAID

## 2025-04-04 DIAGNOSIS — F90.9 ADULT ADHD: ICD-10-CM

## 2025-05-02 ENCOUNTER — OFFICE VISIT (OUTPATIENT)
Dept: FAMILY MEDICINE | Facility: CLINIC | Age: 40
End: 2025-05-02
Payer: MEDICAID

## 2025-05-02 DIAGNOSIS — F90.9 ADULT ADHD: Primary | ICD-10-CM

## 2025-05-02 PROCEDURE — 99213 OFFICE O/P EST LOW 20 MIN: CPT | Mod: PBBFAC,PN | Performed by: FAMILY MEDICINE

## 2025-05-02 PROCEDURE — 3008F BODY MASS INDEX DOCD: CPT | Mod: CPTII,,, | Performed by: FAMILY MEDICINE

## 2025-05-02 PROCEDURE — 1159F MED LIST DOCD IN RCRD: CPT | Mod: CPTII,,, | Performed by: FAMILY MEDICINE

## 2025-05-02 PROCEDURE — 3074F SYST BP LT 130 MM HG: CPT | Mod: CPTII,,, | Performed by: FAMILY MEDICINE

## 2025-05-02 PROCEDURE — 99213 OFFICE O/P EST LOW 20 MIN: CPT | Mod: S$PBB,,, | Performed by: FAMILY MEDICINE

## 2025-05-02 PROCEDURE — 3079F DIAST BP 80-89 MM HG: CPT | Mod: CPTII,,, | Performed by: FAMILY MEDICINE

## 2025-05-02 PROCEDURE — 99999 PR PBB SHADOW E&M-EST. PATIENT-LVL III: CPT | Mod: PBBFAC,,, | Performed by: FAMILY MEDICINE

## 2025-05-02 RX ORDER — LISDEXAMFETAMINE DIMESYLATE 60 MG/1
60 CAPSULE ORAL EVERY MORNING
Qty: 30 CAPSULE | Refills: 0 | Status: SHIPPED | OUTPATIENT
Start: 2025-05-06

## 2025-05-02 RX ORDER — LISDEXAMFETAMINE DIMESYLATE 60 MG/1
60 CAPSULE ORAL EVERY MORNING
Qty: 30 CAPSULE | Refills: 0 | Status: SHIPPED | OUTPATIENT
Start: 2025-07-06

## 2025-05-02 RX ORDER — LISDEXAMFETAMINE DIMESYLATE 60 MG/1
60 CAPSULE ORAL EVERY MORNING
Qty: 30 CAPSULE | Refills: 0 | Status: SHIPPED | OUTPATIENT
Start: 2025-06-06

## 2025-05-02 NOTE — PROGRESS NOTES
"  SUBJECTIVE:    Patient ID: Iris Blanchard is a 40 y.o. female.    Chief Complaint: ADHD  39 yo female new to this provider here today for refills on her ADD/ADHD medications.The patient is here today to follow up on attention deficit hyperactivity disorder. Three months ago, and was prescribed Lisdexamphetamine 60mg.     I reviewed her overdue health maintenance, patient is due for hepatitis-C screening, COVID-19 vaccine, pneumococcal vaccine, HIV screening, cervical cancer screening.     Significant past medical Hx  ADD: Vyvanse 60mg   HSV: Valtrex 500mg  Acne: Clindamycin 1%, Doxycycline 100mg  Contraception: Mirena     Specialists:  Ortho: Dr Yu  GYN: Dr Garrido  Derm: Dr Jones     Smoke: 1/2 ppd  ETOH: None  Exercise: walking the dog    HPI    ADHD Adult: On Medications  Have difficulty sustaining attention in tasks or fun activities?  no  Don't follow through on instructions and fail to finish work?  no  Have difficulty organizing tasks and activities?  no  Avoid, dislike, or are reluctant to engage in work thar requires sustained mental effort?  no  Easily distracted?  no  Forgetful in daily activities?  no  Fidget with hands or feet, or squirm in seat?  no  Have difficulty engaging in leisure activities or doing fun things quietly?  no  Feel "on the go" or "driven by a motor"?  no  Blurt out answers before questions have been completed?  no  Have difficulty waiting your turn, are impatient?  no  Interrupt or intrude on others?  no      Past Medical History:   Diagnosis Date    ADHD (attention deficit hyperactivity disorder)     Anxiety     Closed dislocation of right patella 09/24/2021     Social History[1]  Past Surgical History:   Procedure Laterality Date    EYE SURGERY       Family History   Problem Relation Name Age of Onset    Hypertension Mother      Diabetes Father      Ovarian cancer Sister       Current Medications[2]  Review of patient's allergies indicates:  No Known Allergies    Review " "of Systems   Constitutional:  Negative for activity change, diaphoresis, fatigue and unexpected weight change.   HENT:  Negative for congestion, hearing loss, rhinorrhea and trouble swallowing.    Eyes:  Negative for discharge and visual disturbance.   Respiratory:  Negative for cough, chest tightness, shortness of breath and wheezing.    Cardiovascular:  Negative for chest pain and palpitations.   Gastrointestinal:  Negative for blood in stool, constipation, diarrhea and vomiting.   Endocrine: Negative for polydipsia and polyuria.   Genitourinary:  Negative for difficulty urinating, dysuria, hematuria and menstrual problem.   Musculoskeletal:  Negative for arthralgias, joint swelling and neck pain.   Neurological:  Negative for weakness and headaches.   Psychiatric/Behavioral:  Negative for confusion and dysphoric mood.           Blood pressure 129/88, pulse 109, height 4' 11" (1.499 m), weight 64.4 kg (141 lb 15.6 oz), SpO2 97%. Body mass index is 28.68 kg/m².   Objective:      Physical Exam  Vitals (hypertensive asymptomatic) reviewed.   Constitutional:       General: She is not in acute distress.     Appearance: Normal appearance. She is not ill-appearing or toxic-appearing.   HENT:      Head: Normocephalic and atraumatic.   Cardiovascular:      Rate and Rhythm: Normal rate and regular rhythm.      Heart sounds: No murmur heard.  Pulmonary:      Effort: Pulmonary effort is normal. No respiratory distress.      Breath sounds: Normal breath sounds. No wheezing or rhonchi.   Skin:     Capillary Refill: Capillary refill takes less than 2 seconds.   Neurological:      Mental Status: She is alert and oriented to person, place, and time.         Assessment:       1. Adult ADHD         Plan:           Adult ADHD  -     lisdexamfetamine (VYVANSE) 60 MG capsule; Take 1 capsule (60 mg total) by mouth every morning.  Dispense: 30 capsule; Refill: 0  -     lisdexamfetamine (VYVANSE) 60 MG capsule; Take 1 capsule (60 mg " total) by mouth every morning.  Dispense: 30 capsule; Refill: 0  -     lisdexamfetamine (VYVANSE) 60 MG capsule; Take 1 capsule (60 mg total) by mouth every morning.  Dispense: 30 capsule; Refill: 0      ADHD medication refilled after  reviewed and no red flags noted. Patient reports improvement in symptoms with no significant side effects.     I explained the side effects of ADHD stimulants including emotional lability, seizures, arrhythmias, palpitations, hypertension, dizziness, syncope, appetite changes, weight loss, insomnia, addiction/dependency, elevated heart rate, headaches, anxiety/agitation, and worsening of underlying psychiatric conditions. Patient denies having history of cardiac disease. She denies chest pain upon exertion, dyspnea, nausea, vomiting, diaphoresis, and syncope. While ADHD medications do carry risk of arrhythmias, hypertension, neurological, and psychiatric complications, I feel that the benefits exceed the risks in this patient. A discussion was made going over the risks and benefits of the medication and after shared decision making they decided to continue their medication. They understood the choice, were able to express a  choice, appreciated the risks associated with it, and they had logical reasoning for their choice and demonstrated capacity.  reviewed and consistent with medication use as prescribed. The patient was cautioned to go to the emergency room for chest pain, severe headache, fainting, etc.  Patient was told to report any side effects or any symptoms to me immediately and they agreed to this plan. All questions were answered.                                 [1]   Social History  Socioeconomic History    Marital status: Single   Occupational History    Occupation: unemployed   Tobacco Use    Smoking status: Every Day     Current packs/day: 0.50     Types: Cigarettes     Passive exposure: Never    Smokeless tobacco: Never   Substance and Sexual Activity    Alcohol  use: No    Drug use: No    Sexual activity: Yes     Partners: Female     Birth control/protection: I.U.D.     Social Drivers of Health     Stress: No Stress Concern Present (11/19/2019)    [employer] - Occupational Stress Questionnaire     Feeling of Stress : Not at all   [2]   Current Outpatient Medications   Medication Sig Dispense Refill    gabapentin (NEURONTIN) 300 MG capsule TAKE 1 CAPSULE(300 MG) BY MOUTH EVERY EVENING 30 capsule 0    valACYclovir (VALTREX) 1000 MG tablet Take 1,000 mg by mouth.      [START ON 5/6/2025] lisdexamfetamine (VYVANSE) 60 MG capsule Take 1 capsule (60 mg total) by mouth every morning. 30 capsule 0    [START ON 6/6/2025] lisdexamfetamine (VYVANSE) 60 MG capsule Take 1 capsule (60 mg total) by mouth every morning. 30 capsule 0    [START ON 7/6/2025] lisdexamfetamine (VYVANSE) 60 MG capsule Take 1 capsule (60 mg total) by mouth every morning. 30 capsule 0     No current facility-administered medications for this visit.

## 2025-05-04 VITALS
HEART RATE: 109 BPM | WEIGHT: 142 LBS | BODY MASS INDEX: 28.63 KG/M2 | SYSTOLIC BLOOD PRESSURE: 129 MMHG | HEIGHT: 59 IN | OXYGEN SATURATION: 97 % | DIASTOLIC BLOOD PRESSURE: 88 MMHG

## 2025-07-08 DIAGNOSIS — F90.9 ADULT ADHD: ICD-10-CM

## 2025-07-08 NOTE — TELEPHONE ENCOUNTER
----- Message from Mai sent at 7/8/2025  9:40 AM CDT -----  Patient calling in regarding to seeing if her vyvanse is able to be refilled? Please give patient a call to clarify her other questions and concerns.   [phone]

## 2025-07-08 NOTE — TELEPHONE ENCOUNTER
No care due was identified.  [facility] Embedded Care Due Messages. Reference number: [id number].   7/08/2025 9:42:58 AM CDT

## 2025-07-14 RX ORDER — LISDEXAMFETAMINE DIMESYLATE 60 MG/1
60 CAPSULE ORAL EVERY MORNING
Qty: 30 CAPSULE | Refills: 0 | Status: SHIPPED | OUTPATIENT
Start: 2025-07-14

## 2025-08-15 DIAGNOSIS — F90.9 ADULT ADHD: ICD-10-CM

## 2025-08-15 RX ORDER — LISDEXAMFETAMINE DIMESYLATE 60 MG/1
60 CAPSULE ORAL EVERY MORNING
Qty: 30 CAPSULE | Refills: 0 | Status: SHIPPED | OUTPATIENT
Start: 2025-08-15

## 2025-08-26 ENCOUNTER — OFFICE VISIT (OUTPATIENT)
Dept: FAMILY MEDICINE | Facility: CLINIC | Age: 40
End: 2025-08-26
Payer: MEDICAID

## 2025-08-26 VITALS
DIASTOLIC BLOOD PRESSURE: 86 MMHG | BODY MASS INDEX: 27.87 KG/M2 | HEIGHT: 59 IN | OXYGEN SATURATION: 99 % | TEMPERATURE: 98 F | SYSTOLIC BLOOD PRESSURE: 119 MMHG | HEART RATE: 93 BPM | WEIGHT: 138.25 LBS

## 2025-08-26 DIAGNOSIS — I10 PRIMARY HYPERTENSION: Primary | ICD-10-CM

## 2025-08-26 DIAGNOSIS — F90.9 ADULT ADHD: ICD-10-CM

## 2025-08-26 DIAGNOSIS — B37.31 VAGINAL CANDIDA: ICD-10-CM

## 2025-08-26 PROCEDURE — 3074F SYST BP LT 130 MM HG: CPT | Mod: CPTII,,, | Performed by: FAMILY MEDICINE

## 2025-08-26 PROCEDURE — 99213 OFFICE O/P EST LOW 20 MIN: CPT | Mod: S$PBB,,, | Performed by: FAMILY MEDICINE

## 2025-08-26 PROCEDURE — 99999 PR PBB SHADOW E&M-EST. PATIENT-LVL III: CPT | Mod: PBBFAC,,, | Performed by: FAMILY MEDICINE

## 2025-08-26 PROCEDURE — 3079F DIAST BP 80-89 MM HG: CPT | Mod: CPTII,,, | Performed by: FAMILY MEDICINE

## 2025-08-26 PROCEDURE — 3008F BODY MASS INDEX DOCD: CPT | Mod: CPTII,,, | Performed by: FAMILY MEDICINE

## 2025-08-26 PROCEDURE — 99213 OFFICE O/P EST LOW 20 MIN: CPT | Mod: PBBFAC,PN | Performed by: FAMILY MEDICINE

## 2025-08-26 PROCEDURE — 1159F MED LIST DOCD IN RCRD: CPT | Mod: CPTII,,, | Performed by: FAMILY MEDICINE

## 2025-08-26 RX ORDER — LISDEXAMFETAMINE DIMESYLATE 60 MG/1
60 CAPSULE ORAL EVERY MORNING
Qty: 30 CAPSULE | Refills: 0 | Status: SHIPPED | OUTPATIENT
Start: 2025-10-18

## 2025-08-26 RX ORDER — LISDEXAMFETAMINE DIMESYLATE 60 MG/1
60 CAPSULE ORAL EVERY MORNING
Qty: 30 CAPSULE | Refills: 0 | Status: SHIPPED | OUTPATIENT
Start: 2025-11-18

## 2025-08-26 RX ORDER — LISDEXAMFETAMINE DIMESYLATE 60 MG/1
60 CAPSULE ORAL EVERY MORNING
Qty: 30 CAPSULE | Refills: 0 | Status: SHIPPED | OUTPATIENT
Start: 2025-09-18

## 2025-08-26 RX ORDER — FLUCONAZOLE 150 MG/1
150 TABLET ORAL DAILY
Qty: 1 TABLET | Refills: 0 | Status: SHIPPED | OUTPATIENT
Start: 2025-08-26 | End: 2025-08-27